# Patient Record
Sex: MALE | Race: BLACK OR AFRICAN AMERICAN | NOT HISPANIC OR LATINO | Employment: OTHER | URBAN - METROPOLITAN AREA
[De-identification: names, ages, dates, MRNs, and addresses within clinical notes are randomized per-mention and may not be internally consistent; named-entity substitution may affect disease eponyms.]

---

## 2019-12-20 ENCOUNTER — TELEPHONE (OUTPATIENT)
Dept: GASTROENTEROLOGY | Facility: CLINIC | Age: 74
End: 2019-12-20

## 2019-12-20 NOTE — TELEPHONE ENCOUNTER
Arik pt - Pt states we have to call the [facility] HOSPITAL choice program and ask for Wilian Masters in order for pt to get approval for an appt with us  The number is [phone]   ty

## 2019-12-31 RX ORDER — PREDNISONE 10 MG/1
TABLET ORAL
COMMUNITY
Start: 2018-02-15 | End: 2020-01-09 | Stop reason: ALTCHOICE

## 2019-12-31 RX ORDER — TAMSULOSIN HYDROCHLORIDE 0.4 MG/1
0.4 CAPSULE ORAL
COMMUNITY
Start: 2019-12-23 | End: 2021-11-12

## 2019-12-31 RX ORDER — AMLODIPINE BESYLATE 10 MG/1
10 TABLET ORAL DAILY
COMMUNITY
End: 2022-04-22 | Stop reason: SDUPTHER

## 2019-12-31 RX ORDER — LEVOFLOXACIN 500 MG/1
TABLET, FILM COATED ORAL
Refills: 0 | COMMUNITY
Start: 2019-10-03 | End: 2020-01-09 | Stop reason: ALTCHOICE

## 2019-12-31 RX ORDER — CLONAZEPAM 0.5 MG/1
0.5 TABLET ORAL 2 TIMES DAILY PRN
COMMUNITY
End: 2020-06-15 | Stop reason: SDUPTHER

## 2019-12-31 RX ORDER — PAROXETINE 30 MG/1
30 TABLET, FILM COATED ORAL
COMMUNITY
End: 2020-12-30 | Stop reason: SDUPTHER

## 2019-12-31 RX ORDER — LISINOPRIL 10 MG/1
TABLET ORAL
COMMUNITY
End: 2020-01-09 | Stop reason: ALTCHOICE

## 2019-12-31 RX ORDER — GABAPENTIN 300 MG/1
CAPSULE ORAL
COMMUNITY
Start: 2018-02-14 | End: 2021-05-05

## 2019-12-31 RX ORDER — AZITHROMYCIN 250 MG/1
TABLET, FILM COATED ORAL
COMMUNITY
Start: 2018-05-08 | End: 2020-01-09 | Stop reason: ALTCHOICE

## 2019-12-31 RX ORDER — PROMETHAZINE HYDROCHLORIDE AND CODEINE PHOSPHATE 6.25; 1 MG/5ML; MG/5ML
5 SOLUTION ORAL EVERY 4 HOURS PRN
Refills: 0 | COMMUNITY
Start: 2019-10-03 | End: 2020-01-09 | Stop reason: ALTCHOICE

## 2020-01-02 ENCOUNTER — OFFICE VISIT (OUTPATIENT)
Dept: GASTROENTEROLOGY | Facility: CLINIC | Age: 75
End: 2020-01-02
Payer: COMMERCIAL

## 2020-01-02 ENCOUNTER — TELEPHONE (OUTPATIENT)
Dept: GASTROENTEROLOGY | Facility: CLINIC | Age: 75
End: 2020-01-02

## 2020-01-02 ENCOUNTER — PREP FOR PROCEDURE (OUTPATIENT)
Dept: GASTROENTEROLOGY | Facility: CLINIC | Age: 75
End: 2020-01-02

## 2020-01-02 VITALS
DIASTOLIC BLOOD PRESSURE: 82 MMHG | WEIGHT: 240.2 LBS | HEIGHT: 68 IN | SYSTOLIC BLOOD PRESSURE: 166 MMHG | BODY MASS INDEX: 36.4 KG/M2 | HEART RATE: 96 BPM

## 2020-01-02 DIAGNOSIS — D64.9 ANEMIA, UNSPECIFIED TYPE: Primary | ICD-10-CM

## 2020-01-02 PROCEDURE — 99203 OFFICE O/P NEW LOW 30 MIN: CPT | Performed by: PHYSICIAN ASSISTANT

## 2020-01-02 RX ORDER — CLONIDINE HYDROCHLORIDE 0.1 MG/1
0.1 TABLET ORAL EVERY 12 HOURS SCHEDULED
COMMUNITY
End: 2020-09-30

## 2020-01-02 RX ORDER — OMEGA-3S/DHA/EPA/FISH OIL/D3 300MG-1000
400 CAPSULE ORAL DAILY
COMMUNITY

## 2020-01-02 RX ORDER — DIPHENOXYLATE HYDROCHLORIDE AND ATROPINE SULFATE 2.5; .025 MG/1; MG/1
1 TABLET ORAL DAILY
COMMUNITY

## 2020-01-02 NOTE — TELEPHONE ENCOUNTER
[facility] and hemocult test [facility]      [facility] and will [facility]ed to our office

## 2020-01-02 NOTE — PROGRESS NOTES
[facility] - Outpatient Consultation  Piotr Garcia 69 y o  male MRN: [id number]  Encounter: [id number]          ASSESSMENT AND PLAN:      1  Anemia, unspecified type  New  Per patient Hemoccult was positive  Will investigate with EGD and Colonoscopy  Obtain recent labs  If negative EGD/Colonoscopy will have to consider VCE    ______________________________________________________________________    HPI:  56-year-old male presents for evaluation of anemia  Per the patient this is a new problem  He states that he suffers from some slight constipation but denies any other significant gastrointestinal problems  He noted blood in his stool on 1 occasion and reports that he submit a stool Hemoccult and was told it was positive  He is currently on treatment for prostate cancer  He completed radiation in May of this year and is now receiving Lupron shots  He denies requiring chemotherapy  He states that his PSA is normal at present  He had a colonoscopy in 8/2016 with a small tubular adenoma removed  He is not on any blood thinning medications      REVIEW OF SYSTEMS:    CONSTITUTIONAL: Denies any fever, chills, rigors, and weight loss  HEENT: No earache or tinnitus  Denies hearing loss or visual disturbances  CARDIOVASCULAR: No chest pain or palpitations  RESPIRATORY: Denies any cough, hemoptysis, shortness of breath or dyspnea on exertion  GASTROINTESTINAL: As noted in the History of Present Illness  GENITOURINARY: No problems with urination  Denies any hematuria or dysuria  NEUROLOGIC: No dizziness or vertigo, denies headaches  MUSCULOSKELETAL: Denies any muscle or joint pain  SKIN: Denies skin rashes or itching  ENDOCRINE: Denies excessive thirst  Denies intolerance to heat or cold  PSYCHOSOCIAL: Denies depression or anxiety  Denies any recent memory loss         Historical Information   Past Medical History:   Diagnosis Date    Diabetes mellitus ([facility] [address]     Hypertension     PTSD (post-traumatic stress disorder)      History reviewed  No pertinent surgical history  Social History   Social History     Substance and Sexual Activity   Alcohol Use Not Currently     Social History     Substance and Sexual Activity   Drug Use Never     Social History     Tobacco Use   Smoking Status Never Smoker   Smokeless Tobacco Never Used     Family History   Problem Relation Age of Onset    Diabetes Mother     Hypertension Mother     Multiple sclerosis Sister     Parkinsonism Sister        Meds/Allergies       Current Outpatient Medications:     amLODIPine (NORVASC) 10 mg tablet    cholecalciferol (VITAMIN D3) 400 units tablet    clonazePAM (KlonoPIN) 0 5 mg tablet    cloNIDine (CATAPRES) 0 1 mg tablet    gabapentin (NEURONTIN) 300 mg capsule    glucose blood test strip    multivitamin (THERAGRAN) TABS    PARoxetine (PAXIL) 30 mg tablet    POTASSIUM PO    Promethazine-Codeine 6 25-10 MG/5ML SOLN    tamsulosin (FLOMAX) 0 4 mg    azithromycin (ZITHROMAX) 250 mg tablet    levofloxacin (LEVAQUIN) 500 mg tablet    lisinopril (ZESTRIL) 10 mg tablet    predniSONE 10 mg tablet    Allergies   Allergen Reactions    Lisinopril            Objective     Blood pressure 166/82, pulse 96, height 5' 8" (1 727 m), weight 109 kg (240 lb 3 2 oz)  Body mass index is 36 52 kg/m²  PHYSICAL EXAM:      General Appearance:   Alert, cooperative, no distress   HEENT:   Normocephalic, atraumatic, anicteric      Neck:  Supple, symmetrical, trachea midline   Lungs:   Clear to auscultation bilaterally; no rales, rhonchi or wheezing; respirations unlabored    Heart[de-identified]   Regular rate and rhythm; no murmur, rub, or gallop     Abdomen:   Soft, non-tender, non-distended; normal bowel sounds; no masses, no organomegaly    Genitalia:   Deferred    Rectal:   Deferred    Extremities:  No cyanosis, clubbing or edema    Pulses:  2+ and symmetric    Skin:  No jaundice, rashes, or lesions    Lymph nodes:  No palpable cervical lymphadenopathy        Lab Results:   No visits with results within 1 Day(s) from this visit  Latest known visit with results is:   No results found for any previous visit  Radiology Results:   No results found

## 2020-01-09 ENCOUNTER — ANESTHESIA (OUTPATIENT)
Dept: GASTROENTEROLOGY | Facility: HOSPITAL | Age: 75
End: 2020-01-09

## 2020-01-09 ENCOUNTER — ANESTHESIA EVENT (OUTPATIENT)
Dept: GASTROENTEROLOGY | Facility: HOSPITAL | Age: 75
End: 2020-01-09

## 2020-01-09 ENCOUNTER — HOSPITAL ENCOUNTER (OUTPATIENT)
Dept: GASTROENTEROLOGY | Facility: HOSPITAL | Age: 75
Setting detail: OUTPATIENT SURGERY
Discharge: HOME | End: 2020-01-09
Attending: INTERNAL MEDICINE | Admitting: INTERNAL MEDICINE
Payer: OTHER GOVERNMENT

## 2020-01-09 VITALS
RESPIRATION RATE: 17 BRPM | WEIGHT: 235.01 LBS | DIASTOLIC BLOOD PRESSURE: 90 MMHG | TEMPERATURE: 98.5 F | HEART RATE: 85 BPM | BODY MASS INDEX: 35.62 KG/M2 | OXYGEN SATURATION: 93 % | HEIGHT: 68 IN | SYSTOLIC BLOOD PRESSURE: 145 MMHG

## 2020-01-09 DIAGNOSIS — D64.9 ANEMIA, UNSPECIFIED TYPE: ICD-10-CM

## 2020-01-09 LAB — GLUCOSE SERPL-MCNC: 115 MG/DL (ref 65–140)

## 2020-01-09 PROCEDURE — 82948 REAGENT STRIP/BLOOD GLUCOSE: CPT

## 2020-01-09 PROCEDURE — 88305 TISSUE EXAM BY PATHOLOGIST: CPT | Performed by: PATHOLOGY

## 2020-01-09 PROCEDURE — NC001 PR NO CHARGE: Performed by: INTERNAL MEDICINE

## 2020-01-09 PROCEDURE — 43239 EGD BIOPSY SINGLE/MULTIPLE: CPT | Performed by: INTERNAL MEDICINE

## 2020-01-09 PROCEDURE — 45380 COLONOSCOPY AND BIOPSY: CPT | Performed by: INTERNAL MEDICINE

## 2020-01-09 RX ORDER — SODIUM CHLORIDE, SODIUM LACTATE, POTASSIUM CHLORIDE, CALCIUM CHLORIDE 600; 310; 30; 20 MG/100ML; MG/100ML; MG/100ML; MG/100ML
INJECTION, SOLUTION INTRAVENOUS CONTINUOUS PRN
Status: DISCONTINUED | OUTPATIENT
Start: 2020-01-09 | End: 2020-01-09 | Stop reason: SURG

## 2020-01-09 RX ORDER — PROPOFOL 10 MG/ML
INJECTION, EMULSION INTRAVENOUS AS NEEDED
Status: DISCONTINUED | OUTPATIENT
Start: 2020-01-09 | End: 2020-01-09 | Stop reason: SURG

## 2020-01-09 RX ORDER — LIDOCAINE HYDROCHLORIDE 20 MG/ML
INJECTION, SOLUTION EPIDURAL; INFILTRATION; INTRACAUDAL; PERINEURAL AS NEEDED
Status: DISCONTINUED | OUTPATIENT
Start: 2020-01-09 | End: 2020-01-09 | Stop reason: SURG

## 2020-01-09 RX ORDER — GLYCOPYRROLATE 0.2 MG/ML
INJECTION INTRAMUSCULAR; INTRAVENOUS AS NEEDED
Status: DISCONTINUED | OUTPATIENT
Start: 2020-01-09 | End: 2020-01-09 | Stop reason: SURG

## 2020-01-09 RX ADMIN — PROPOFOL 30 MG: 10 INJECTION, EMULSION INTRAVENOUS at 07:32

## 2020-01-09 RX ADMIN — PROPOFOL 30 MG: 10 INJECTION, EMULSION INTRAVENOUS at 07:29

## 2020-01-09 RX ADMIN — PROPOFOL 20 MG: 10 INJECTION, EMULSION INTRAVENOUS at 07:23

## 2020-01-09 RX ADMIN — SODIUM CHLORIDE, SODIUM LACTATE, POTASSIUM CHLORIDE, AND CALCIUM CHLORIDE: .6; .31; .03; .02 INJECTION, SOLUTION INTRAVENOUS at 07:21

## 2020-01-09 RX ADMIN — GLYCOPYRROLATE 0.2 MG: 0.2 INJECTION, SOLUTION INTRAMUSCULAR; INTRAVENOUS at 07:22

## 2020-01-09 RX ADMIN — PROPOFOL 110 MG: 10 INJECTION, EMULSION INTRAVENOUS at 07:22

## 2020-01-09 RX ADMIN — PROPOFOL 30 MG: 10 INJECTION, EMULSION INTRAVENOUS at 07:26

## 2020-01-09 RX ADMIN — LIDOCAINE HYDROCHLORIDE 100 MG: 20 INJECTION, SOLUTION EPIDURAL; INFILTRATION; INTRACAUDAL; PERINEURAL at 07:22

## 2020-01-09 RX ADMIN — PROPOFOL 30 MG: 10 INJECTION, EMULSION INTRAVENOUS at 07:35

## 2020-01-09 NOTE — ANESTHESIA POSTPROCEDURE EVALUATION
Post-Op Assessment Note    CV Status:  Stable  Pain Score: 0    Pain management: adequate     Mental Status:  Alert and awake   Hydration Status:  Euvolemic   PONV Controlled:  Controlled   Airway Patency:  Patent   Post Op Vitals Reviewed: Yes      Staff: CRNA           BP   139/72   Temp     Pulse  91   Resp   15   SpO2   97%

## 2020-01-09 NOTE — DISCHARGE INSTRUCTIONS

## 2020-01-09 NOTE — H&P
History and Physical - [facility]  Mohit Green 69 y o  male MRN: [id number]      HPI: Mohit Green is a 69y o  year old male who presents for anemia, heme-positive stool      REVIEW OF SYSTEMS: Per the HPI, and otherwise unremarkable  Historical Information   Past Medical History:   Diagnosis Date    Cancer [facility])     PROSTATE CANCER    Colon polyp     Diabetes mellitus ([address] )     Hyperlipidemia     Hypertension     PTSD (post-traumatic stress disorder)      Past Surgical History:   Procedure Laterality Date    APPENDECTOMY       Social History   Social History     Substance and Sexual Activity   Alcohol Use Not Currently     Social History     Substance and Sexual Activity   Drug Use Never     Social History     Tobacco Use   Smoking Status Never Smoker   Smokeless Tobacco Never Used     Family History   Problem Relation Age of Onset    Diabetes Mother     Hypertension Mother     Multiple sclerosis Sister     Parkinsonism Sister        Meds/Allergies       (Not in a hospital admission)    Allergies   Allergen Reactions    Lisinopril Anaphylaxis       Objective     Blood pressure 159/93, pulse 78, temperature (!) 97 1 °F (36 2 °C), temperature source Temporal, resp  rate 19, height 5' 8" (1 727 m), weight 107 kg (235 lb 0 2 oz), SpO2 95 %  PHYSICAL EXAM    Gen: NAD  CV: RRR  CHEST: Clear  ABD: soft, NT/ND  EXT: no edema      ASSESSMENT/PLAN:  This is a 69y o  year old male here for esophagogastroduodenoscopy, colonoscopy, and he is stable and optimized for his procedure

## 2020-01-09 NOTE — ANESTHESIA PREPROCEDURE EVALUATION
Review of Systems/Medical History  Patient summary reviewed  Chart reviewed  No history of anesthetic complications     Cardiovascular  EKG reviewed, Exercise tolerance (METS): >4,  Hyperlipidemia, Hypertension controlled,    Pulmonary  Asthma , PRN med  controlled Last rescue: < 1 week ago Asthma type of rescue: PRN nebulizer,        GI/Hepatic            Endo/Other  Diabetes well controlled Diet controlled,   Obesity    GYN       Hematology   Musculoskeletal       Neurology   Psychology   Anxiety,              Physical Exam    Airway    Mallampati score: III  TM Distance: >3 FB  Neck ROM: full     Dental   upper dentures and lower dentures,     Cardiovascular      Pulmonary      Other Findings        Anesthesia Plan  ASA Score- 3     Anesthesia Type- IV sedation with anesthesia with ASA Monitors  Additional Monitors:   Airway Plan:         Plan Factors-    Induction- intravenous  Postoperative Plan-     Informed Consent- Anesthetic plan and risks discussed with patient  I personally reviewed this patient with the CRNA  Discussed and agreed on the Anesthesia Plan with the CRNA  Katy Navarro

## 2020-01-21 ENCOUNTER — TELEPHONE (OUTPATIENT)
Dept: GASTROENTEROLOGY | Facility: CLINIC | Age: 75
End: 2020-01-21

## 2020-02-03 ENCOUNTER — OFFICE VISIT (OUTPATIENT)
Dept: GASTROENTEROLOGY | Facility: CLINIC | Age: 75
End: 2020-02-03
Payer: COMMERCIAL

## 2020-02-03 DIAGNOSIS — D50.9 IRON DEFICIENCY ANEMIA, UNSPECIFIED IRON DEFICIENCY ANEMIA TYPE: Primary | ICD-10-CM

## 2020-02-05 PROCEDURE — 91110 GI TRC IMG INTRAL ESOPH-ILE: CPT | Performed by: INTERNAL MEDICINE

## 2020-02-07 ENCOUNTER — TELEPHONE (OUTPATIENT)
Dept: GASTROENTEROLOGY | Facility: CLINIC | Age: 75
End: 2020-02-07

## 2020-05-29 ENCOUNTER — TELEMEDICINE (OUTPATIENT)
Dept: FAMILY MEDICINE CLINIC | Facility: CLINIC | Age: 75
End: 2020-05-29
Payer: COMMERCIAL

## 2020-05-29 VITALS
DIASTOLIC BLOOD PRESSURE: 95 MMHG | HEART RATE: 74 BPM | TEMPERATURE: 97.3 F | OXYGEN SATURATION: 97 % | RESPIRATION RATE: 20 BRPM | SYSTOLIC BLOOD PRESSURE: 157 MMHG

## 2020-05-29 DIAGNOSIS — M54.50 LOW BACK PAIN, UNSPECIFIED BACK PAIN LATERALITY, UNSPECIFIED CHRONICITY, UNSPECIFIED WHETHER SCIATICA PRESENT: ICD-10-CM

## 2020-05-29 DIAGNOSIS — M54.50 ACUTE BILATERAL LOW BACK PAIN WITHOUT SCIATICA: ICD-10-CM

## 2020-05-29 DIAGNOSIS — E11.9 TYPE 2 DIABETES MELLITUS WITHOUT COMPLICATION, WITH LONG-TERM CURRENT USE OF INSULIN (HCC): ICD-10-CM

## 2020-05-29 DIAGNOSIS — I10 ESSENTIAL HYPERTENSION: ICD-10-CM

## 2020-05-29 DIAGNOSIS — Z79.4 TYPE 2 DIABETES MELLITUS WITHOUT COMPLICATION, WITH LONG-TERM CURRENT USE OF INSULIN (HCC): ICD-10-CM

## 2020-05-29 DIAGNOSIS — C61 PROSTATE CANCER (HCC): Primary | ICD-10-CM

## 2020-05-29 DIAGNOSIS — F32.A DEPRESSION, UNSPECIFIED DEPRESSION TYPE: ICD-10-CM

## 2020-05-29 PROCEDURE — 99214 OFFICE O/P EST MOD 30 MIN: CPT | Performed by: FAMILY MEDICINE

## 2020-05-29 RX ORDER — INSULIN GLARGINE 100 [IU]/ML
24 INJECTION, SOLUTION SUBCUTANEOUS
COMMUNITY

## 2020-05-29 RX ORDER — CYCLOBENZAPRINE HCL 10 MG
10 TABLET ORAL 3 TIMES DAILY PRN
Qty: 30 TABLET | Refills: 0 | Status: SHIPPED | OUTPATIENT
Start: 2020-05-29 | End: 2021-05-05 | Stop reason: CLARIF

## 2020-06-15 DIAGNOSIS — F41.9 ANXIETY: Primary | ICD-10-CM

## 2020-06-15 RX ORDER — CLONAZEPAM 0.5 MG/1
TABLET ORAL
Qty: 60 TABLET | OUTPATIENT
Start: 2020-06-15

## 2020-06-15 RX ORDER — CLONAZEPAM 0.5 MG/1
0.5 TABLET ORAL 2 TIMES DAILY PRN
Qty: 60 TABLET | Refills: 5 | Status: SHIPPED | OUTPATIENT
Start: 2020-06-15 | End: 2021-01-26

## 2020-09-30 DIAGNOSIS — I10 ESSENTIAL HYPERTENSION: Primary | ICD-10-CM

## 2020-09-30 RX ORDER — CLONIDINE HYDROCHLORIDE 0.1 MG/1
TABLET ORAL
Qty: 180 TABLET | Refills: 1 | Status: SHIPPED | OUTPATIENT
Start: 2020-09-30 | End: 2021-01-26

## 2020-11-16 ENCOUNTER — IMMUNIZATIONS (OUTPATIENT)
Dept: FAMILY MEDICINE CLINIC | Facility: CLINIC | Age: 75
End: 2020-11-16
Payer: COMMERCIAL

## 2020-11-16 VITALS — TEMPERATURE: 97.3 F

## 2020-11-16 DIAGNOSIS — Z23 IMMUNIZATION DUE: Primary | ICD-10-CM

## 2020-11-16 PROCEDURE — G0008 ADMIN INFLUENZA VIRUS VAC: HCPCS

## 2020-11-16 PROCEDURE — 90662 IIV NO PRSV INCREASED AG IM: CPT

## 2020-12-22 DIAGNOSIS — R42 VERTIGO: Primary | ICD-10-CM

## 2020-12-22 RX ORDER — MECLIZINE HCL 12.5 MG/1
1 TABLET ORAL 3 TIMES DAILY PRN
COMMUNITY
End: 2020-12-23

## 2020-12-23 RX ORDER — MECLIZINE HCL 12.5 MG/1
TABLET ORAL
Qty: 60 TABLET | Refills: 3 | Status: SHIPPED | OUTPATIENT
Start: 2020-12-23 | End: 2021-05-05 | Stop reason: CLARIF

## 2020-12-23 RX ORDER — MECLIZINE HCL 12.5 MG/1
12.5 TABLET ORAL 3 TIMES DAILY PRN
Qty: 30 TABLET | Refills: 2 | Status: SHIPPED | OUTPATIENT
Start: 2020-12-23

## 2020-12-30 ENCOUNTER — TELEPHONE (OUTPATIENT)
Dept: FAMILY MEDICINE CLINIC | Facility: CLINIC | Age: 75
End: 2020-12-30

## 2020-12-30 DIAGNOSIS — F32.A DEPRESSION, UNSPECIFIED DEPRESSION TYPE: Primary | ICD-10-CM

## 2020-12-30 RX ORDER — PAROXETINE 30 MG/1
30 TABLET, FILM COATED ORAL EVERY MORNING
Qty: 90 TABLET | Refills: 1 | Status: SHIPPED | OUTPATIENT
Start: 2020-12-30 | End: 2021-07-24

## 2020-12-30 NOTE — TELEPHONE ENCOUNTER
Patient called stated that he needs refill on his PARoxetine (PAXIL) 30 mg tablet   Please call into [facility] [employer] Thank you [facility]

## 2021-01-23 DIAGNOSIS — I10 ESSENTIAL HYPERTENSION: ICD-10-CM

## 2021-01-25 DIAGNOSIS — F41.9 ANXIETY: ICD-10-CM

## 2021-01-26 RX ORDER — CLONAZEPAM 0.5 MG/1
0.5 TABLET ORAL 2 TIMES DAILY PRN
Qty: 60 TABLET | Refills: 3 | Status: SHIPPED | OUTPATIENT
Start: 2021-01-26 | End: 2021-09-12 | Stop reason: SDUPTHER

## 2021-01-26 RX ORDER — CLONIDINE HYDROCHLORIDE 0.1 MG/1
TABLET ORAL
Qty: 180 TABLET | Refills: 1 | Status: SHIPPED | OUTPATIENT
Start: 2021-01-26 | End: 2021-07-10

## 2021-04-13 DIAGNOSIS — I10 ESSENTIAL HYPERTENSION: ICD-10-CM

## 2021-04-13 DIAGNOSIS — Z79.4 TYPE 2 DIABETES MELLITUS WITHOUT COMPLICATION, WITH LONG-TERM CURRENT USE OF INSULIN (HCC): Primary | ICD-10-CM

## 2021-04-13 DIAGNOSIS — E11.9 TYPE 2 DIABETES MELLITUS WITHOUT COMPLICATION, WITH LONG-TERM CURRENT USE OF INSULIN (HCC): Primary | ICD-10-CM

## 2021-04-13 DIAGNOSIS — Z13.220 SCREENING CHOLESTEROL LEVEL: ICD-10-CM

## 2021-05-03 LAB
CREAT ?TM UR-SCNC: 158.7 UMOL/L
EXT MICROALBUMIN URINE RANDOM: 10.9
HBA1C MFR BLD HPLC: 5.1 %
MICROALBUMIN/CREAT UR: 68.7 MG/G{CREAT}

## 2021-05-05 ENCOUNTER — OFFICE VISIT (OUTPATIENT)
Dept: FAMILY MEDICINE CLINIC | Facility: CLINIC | Age: 76
End: 2021-05-05
Payer: COMMERCIAL

## 2021-05-05 VITALS
WEIGHT: 239 LBS | OXYGEN SATURATION: 96 % | HEART RATE: 82 BPM | BODY MASS INDEX: 36.22 KG/M2 | HEIGHT: 68 IN | DIASTOLIC BLOOD PRESSURE: 70 MMHG | TEMPERATURE: 97.2 F | RESPIRATION RATE: 16 BRPM | SYSTOLIC BLOOD PRESSURE: 120 MMHG

## 2021-05-05 DIAGNOSIS — F33.40 RECURRENT MAJOR DEPRESSIVE DISORDER, IN REMISSION (HCC): ICD-10-CM

## 2021-05-05 DIAGNOSIS — Z11.59 NEED FOR HEPATITIS C SCREENING TEST: Primary | ICD-10-CM

## 2021-05-05 DIAGNOSIS — M54.50 LOW BACK PAIN, UNSPECIFIED BACK PAIN LATERALITY, UNSPECIFIED CHRONICITY, UNSPECIFIED WHETHER SCIATICA PRESENT: ICD-10-CM

## 2021-05-05 DIAGNOSIS — I10 ESSENTIAL HYPERTENSION: ICD-10-CM

## 2021-05-05 DIAGNOSIS — Z00.00 WELL ADULT EXAM: ICD-10-CM

## 2021-05-05 DIAGNOSIS — E11.9 TYPE 2 DIABETES MELLITUS WITHOUT COMPLICATION, WITH LONG-TERM CURRENT USE OF INSULIN (HCC): ICD-10-CM

## 2021-05-05 DIAGNOSIS — E66.01 OBESITY, MORBID (HCC): ICD-10-CM

## 2021-05-05 DIAGNOSIS — Z79.4 TYPE 2 DIABETES MELLITUS WITHOUT COMPLICATION, WITH LONG-TERM CURRENT USE OF INSULIN (HCC): ICD-10-CM

## 2021-05-05 DIAGNOSIS — E26.09 PRIMARY HYPERALDOSTERONISM (HCC): ICD-10-CM

## 2021-05-05 DIAGNOSIS — E78.2 MIXED HYPERLIPIDEMIA: ICD-10-CM

## 2021-05-05 DIAGNOSIS — C61 PROSTATE CANCER (HCC): ICD-10-CM

## 2021-05-05 PROCEDURE — G0439 PPPS, SUBSEQ VISIT: HCPCS | Performed by: FAMILY MEDICINE

## 2021-05-05 PROCEDURE — 99214 OFFICE O/P EST MOD 30 MIN: CPT | Performed by: FAMILY MEDICINE

## 2021-05-05 RX ORDER — ATORVASTATIN CALCIUM 40 MG/1
TABLET, FILM COATED ORAL
COMMUNITY
Start: 2021-01-13

## 2021-05-05 RX ORDER — BACLOFEN 5 MG/1
TABLET ORAL
COMMUNITY
Start: 2021-03-15 | End: 2021-11-12

## 2021-05-05 RX ORDER — BUDESONIDE AND FORMOTEROL FUMARATE DIHYDRATE 160; 4.5 UG/1; UG/1
AEROSOL RESPIRATORY (INHALATION)
COMMUNITY
Start: 2021-04-16 | End: 2021-11-12

## 2021-05-05 RX ORDER — TRAZODONE HYDROCHLORIDE 50 MG/1
TABLET ORAL
COMMUNITY
Start: 2021-01-08 | End: 2022-06-08 | Stop reason: SDUPTHER

## 2021-05-05 RX ORDER — LIDOCAINE 50 MG/G
PATCH TOPICAL
COMMUNITY
Start: 2021-01-08

## 2021-05-05 RX ORDER — SPIRONOLACTONE 25 MG/1
TABLET ORAL
COMMUNITY
Start: 2020-11-12

## 2021-05-05 RX ORDER — FLUTICASONE PROPIONATE 50 MCG
SPRAY, SUSPENSION (ML) NASAL
COMMUNITY
Start: 2021-04-28 | End: 2022-07-22

## 2021-05-05 RX ORDER — IPRATROPIUM BROMIDE AND ALBUTEROL SULFATE 2.5; .5 MG/3ML; MG/3ML
SOLUTION RESPIRATORY (INHALATION)
COMMUNITY
Start: 2020-12-04

## 2021-05-05 NOTE — PROGRESS NOTES
BMI Counseling: Body mass index is 36 88 kg/m²  The BMI is above normal  Nutrition recommendations include decreasing portion sizes, encouraging healthy choices of fruits and vegetables, decreasing fast food intake, consuming healthier snacks, limiting drinks that contain sugar, moderation in carbohydrate intake, increasing intake of lean protein, reducing intake of saturated and trans fat and reducing intake of cholesterol  Exercise recommendations include exercising 3-5 times per week  No pharmacotherapy was ordered  Patient referred to PCP due to patient being overweight  Depression Screening and Follow-up Plan: Patient's depression screening was positive with a PHQ-2 score of 2  Their PHQ-9 score was 3  Patient assessed for underlying major depression  Brief counseling provided and recommend additional follow-up/re-evaluation next office visit  Patient advised to follow-up with PCP for further management  Falls Plan of Care: balance, strength, and gait training instructions were provided  Home safety education provided  Assessment/Plan:         Problem List Items Addressed This Visit        Endocrine    Type 2 diabetes mellitus without complication, with long-term current use of insulin ([address] )     Needs A1C documented ASAP  No results found for: HGBA1C            Cardiovascular and Mediastinum    Essential hypertension     Patient is stable with current anti-hypertensive medicine and continue to follow a low sodium diet and take current medication            Other    Depression     Patient is stable  and will continue present plan of care and reassess at next routine visit         Low back pain     Patient is stable  and will continue present plan of care and reassess at next routine visit           Other Visit Diagnoses     Need for hepatitis C screening test    -  Primary    Well adult exam                Subjective:      Patient ID: Igor Ramey is a 70 y o  male       this is a  73-year-old black male I here for checkup for his Medicare wellness type 2 diabetes hypertension prostate cancer hyperlipidemia and chronic back pain  Patient is overdue for his lab workup ordered all the lab work he needs today he will get that within next week  Patient has not had a COVID-19 vaccine is doing well  Patient is still getting injections for his prostate cancer and he sees a gets his last 1 coming up in this August       The following portions of the patient's history were reviewed and updated as appropriate:   Past Medical History:  He has a past medical history of Cancer ([address] ), Colon polyp, Hyperlipidemia, PTSD (post-traumatic stress disorder), and Type 2 diabetes mellitus without complication, with long-term current use of insulin ([address] ) (5/29/2020)  ,  _______________________________________________________________________  Medical Problems:  does not have any pertinent problems on file ,  _______________________________________________________________________  Past Surgical History:   has a past surgical history that includes Appendectomy  ,  _______________________________________________________________________  Family History:  family history includes Diabetes in his mother; Hypertension in his mother; Multiple sclerosis in his sister; Parkinsonism in his sister  ,  _______________________________________________________________________  Social History:   reports that he has never smoked  He has never used smokeless tobacco  He reports previous alcohol use  He reports that he does not use drugs  ,  _______________________________________________________________________  Allergies:  is allergic to lisinopril     _______________________________________________________________________  Current Outpatient Medications   Medication Sig Dispense Refill    amLODIPine (NORVASC) 10 mg tablet Take 10 mg by mouth daily      cholecalciferol (VITAMIN D3) 400 units tablet Take 400 Units by mouth daily      clonazePAM (KlonoPIN) 0 5 mg tablet TAKE 1 TABLET (0 5 MG TOTAL) BY MOUTH 2 (TWO) TIMES A DAY AS NEEDED FOR ANXIETY  60 tablet 3    cloNIDine (CATAPRES) 0 1 mg tablet TAKE 1 TABLET BY MOUTH TWICE A  tablet 1    cyclobenzaprine (FLEXERIL) 10 mg tablet Take 1 tablet (10 mg total) by mouth 3 (three) times a day as needed for muscle spasms 30 tablet 0    gabapentin (NEURONTIN) 300 mg capsule       glucose blood test strip 1 each by Other route daily as needed Use as instructed      insulin glargine (LANTUS) 100 units/mL subcutaneous injection Inject 24 Units under the skin daily at bedtime      meclizine (ANTIVERT) 12 5 MG tablet Take 1 tablet (12 5 mg total) by mouth 3 (three) times a day as needed for dizziness 30 tablet 2    meclizine (ANTIVERT) 12 5 MG tablet TAKE 1 TABLET BY MOUTH THREE TIMES A DAY AS NEEDED 60 tablet 3    multivitamin (THERAGRAN) TABS Take 1 tablet by mouth daily      PARoxetine (PAXIL) 30 mg tablet Take 1 tablet (30 mg total) by mouth every morning 90 tablet 1    POTASSIUM PO Take 30 mg by mouth      tamsulosin (FLOMAX) 0 4 mg Take 0 4 mg by mouth       No current facility-administered medications for this visit  Patient's shoes and socks removed  Right Foot/Ankle   Right Foot Inspection  Skin Exam: skin normal, skin intact, dry skin and warmth no callus, no erythema, no maceration, no abnormal color, no pre-ulcer, no ulcer and no callus                          Toe Exam: ROM and strength within normal limitsno tenderness  Sensory   Vibration: intact  Proprioception: intact   Monofilament testing: intact  Vascular  Capillary refills: < 3 seconds  The right DP pulse is 2+  The right PT pulse is 2+       Left Foot/Ankle  Left Foot Inspection  Skin Exam: skin intact, dry skin and warmthno erythema, no maceration and normal color                         Toe Exam: no swelling and no tenderness                   Sensory   Vibration: intact  Proprioception: intact  Monofilament: intact  Vascular  Capillary refills: < 3 seconds  The left DP pulse is 2+  The left PT pulse is 2+  Assign Risk Category:  No deformity present; No loss of protective sensation; No weak pulses       Risk: 0    ______________________________________________________________________  Review of Systems   Constitutional: Negative for activity change, appetite change, chills, fatigue, fever and unexpected weight change  HENT: Negative for congestion, ear pain, hearing loss, mouth sores, postnasal drip, sinus pressure, sinus pain, sneezing and sore throat  Respiratory: Negative for apnea, cough, shortness of breath and wheezing  Cardiovascular: Negative for chest pain, palpitations and leg swelling  Gastrointestinal: Negative for abdominal pain, constipation, diarrhea, nausea and vomiting  Endocrine: Negative for cold intolerance and heat intolerance  Genitourinary: Negative for dysuria, frequency and hematuria  Musculoskeletal: Negative for arthralgias, back pain, gait problem, joint swelling and neck pain  Skin: Negative for rash  Neurological: Negative for dizziness, weakness and numbness  Hematological: Does not bruise/bleed easily  Psychiatric/Behavioral: Negative for agitation, behavioral problems, confusion, hallucinations and sleep disturbance  The patient is not nervous/anxious  Objective: There were no vitals filed for this visit  There is no height or weight on file to calculate BMI  Physical Exam  Vitals signs and nursing note reviewed  Constitutional:       Appearance: He is well-developed  He is obese  HENT:      Head: Normocephalic and atraumatic  Nose: Nose normal       Mouth/Throat:      Mouth: Mucous membranes are moist    Eyes:      General: No scleral icterus  Conjunctiva/sclera: Conjunctivae normal       Pupils: Pupils are equal, round, and reactive to light  Neck:      Musculoskeletal: Normal range of motion and neck supple        Thyroid: No thyromegaly  Cardiovascular:      Rate and Rhythm: Normal rate and regular rhythm  Pulses: no weak pulses          Dorsalis pedis pulses are 2+ on the right side and 2+ on the left side  Posterior tibial pulses are 2+ on the right side and 2+ on the left side  Heart sounds: Normal heart sounds  Pulmonary:      Effort: Pulmonary effort is normal  No respiratory distress  Breath sounds: Normal breath sounds  No wheezing  Abdominal:      General: Bowel sounds are normal       Palpations: Abdomen is soft  Tenderness: There is no abdominal tenderness  There is no guarding or rebound  Musculoskeletal: Normal range of motion  Feet:    Feet:      Right foot:      Skin integrity: Warmth and dry skin present  No ulcer, skin breakdown, erythema or callus  Left foot:      Skin integrity: Warmth and dry skin present  No skin breakdown or erythema  Skin:     General: Skin is warm and dry  Findings: No rash  Neurological:      Mental Status: He is alert and oriented to person, place, and time  Psychiatric:         Mood and Affect: Mood normal          Behavior: Behavior normal          Thought Content:  Thought content normal          Judgment: Judgment normal

## 2021-05-05 NOTE — PATIENT INSTRUCTIONS
Medicare Preventive Visit Patient Instructions  Thank you for completing your Welcome to Medicare Visit or Medicare Annual Wellness Visit today  Your next wellness visit will be due in one year (5/6/2022)  The screening/preventive services that you may require over the next 5-10 years are detailed below  Some tests may not apply to you based off risk factors and/or age  Screening tests ordered at today's visit but not completed yet may show as past due  Also, please note that scanned in results may not display below  Preventive Screenings:  Service Recommendations Previous Testing/Comments   Colorectal Cancer Screening  · Colonoscopy    · Fecal Occult Blood Test (FOBT)/Fecal Immunochemical Test (FIT)  · Fecal DNA/Cologuard Test  · Flexible Sigmoidoscopy Age: 54-65 years old   Colonoscopy: every 10 years (May be performed more frequently if at higher risk)  OR  FOBT/FIT: every 1 year  OR  Cologuard: every 3 years  OR  Sigmoidoscopy: every 5 years  Screening may be recommended earlier than age 48 if at higher risk for colorectal cancer  Also, an individualized decision between you and your healthcare provider will decide whether screening between the ages of 74-80 would be appropriate   Colonoscopy: 01/09/2020  FOBT/FIT: Not on file  Cologuard: Not on file  Sigmoidoscopy: Not on file    Screening Current     Prostate Cancer Screening Individualized decision between patient and health care provider in men between ages of 53-78   Medicare will cover every 12 months beginning on the day after your 50th birthday PSA: No results in last 5 years     History Prostate Cancer     Hepatitis C Screening Once for adults born between [birth year] and 1965  More frequently in patients at high risk for Hepatitis C Hep C Antibody: Not on file        Diabetes Screening 1-2 times per year if you're at risk for diabetes or have pre-diabetes Fasting glucose: No results in last 5 years   A1C: No results in last 5 years    Screening Not Indicated  History Diabetes   Cholesterol Screening Once every 5 years if you don't have a lipid disorder  May order more often based on risk factors  Lipid panel: Not on file    Screening Not Indicated  History Lipid Disorder      Other Preventive Screenings Covered by Medicare:  1  Abdominal Aortic Aneurysm (AAA) Screening: covered once if your at risk  You're considered to be at risk if you have a family history of AAA or a male between the age of 73-68 who smoking at least 100 cigarettes in your lifetime  2  Lung Cancer Screening: covers low dose CT scan once per year if you meet all of the following conditions: (1) Age 50-69; (2) No signs or symptoms of lung cancer; (3) Current smoker or have quit smoking within the last 15 years; (4) You have a tobacco smoking history of at least 30 pack years (packs per day x number of years you smoked); (5) You get a written order from a healthcare provider  3  Glaucoma Screening: covered annually if you're considered high risk: (1) You have diabetes OR (2) Family history of glaucoma OR (3)  aged 48 and older OR (3)  American aged 72 and older  3  Osteoporosis Screening: covered every 2 years if you meet one of the following conditions: (1) Have a vertebral abnormality; (2) On glucocorticoid therapy for more than 3 months; (3) Have primary hyperparathyroidism; (4) On osteoporosis medications and need to assess response to drug therapy  5  HIV Screening: covered annually if you're between the age of 12-76  Also covered annually if you are younger than 13 and older than 72 with risk factors for HIV infection  For pregnant patients, it is covered up to 3 times per pregnancy      Immunizations:  Immunization Recommendations   Influenza Vaccine Annual influenza vaccination during flu season is recommended for all persons aged >= 6 months who do not have contraindications   Pneumococcal Vaccine (Prevnar and Pneumovax)  * Prevnar = PCV13  * Pneumovax = PPSV23 Adults 25-60 years old: 1-3 doses may be recommended based on certain risk factors  Adults 72 years old: Prevnar (PCV13) vaccine recommended followed by Pneumovax (PPSV23) vaccine  If already received PPSV23 since turning 65, then PCV13 recommended at least one year after PPSV23 dose  Hepatitis B Vaccine 3 dose series if at intermediate or high risk (ex: diabetes, end stage renal disease, liver disease)   Tetanus (Td) Vaccine - COST NOT COVERED BY MEDICARE PART B Following completion of primary series, a booster dose should be given every 10 years to maintain immunity against tetanus  Td may also be given as tetanus wound prophylaxis  Tdap Vaccine - COST NOT COVERED BY MEDICARE PART B Recommended at least once for all adults  For pregnant patients, recommended with each pregnancy  Shingles Vaccine (Shingrix) - COST NOT COVERED BY MEDICARE PART B  2 shot series recommended in those aged 48 and above     Health Maintenance Due:      Topic Date Due    Hepatitis C Screening  Never done    Colonoscopy Surveillance  01/09/2025    Colorectal Cancer Screening  01/09/2030     Immunizations Due:  There are no preventive care reminders to display for this patient  Advance Directives   What are advance directives? Advance directives are legal documents that state your wishes and plans for medical care  These plans are made ahead of time in case you lose your ability to make decisions for yourself  Advance directives can apply to any medical decision, such as the treatments you want, and if you want to donate organs  What are the types of advance directives? There are many types of advance directives, and each state has rules about how to use them  You may choose a combination of any of the following:  · Living will: This is a written record of the treatment you want  You can also choose which treatments you do not want, which to limit, and which to stop at a certain time   This includes surgery, medicine, IV fluid, and tube feedings  · Durable power of  for healthcare [facility]): This is a written record that states who you want to make healthcare choices for you when you are unable to make them for yourself  This person, called a proxy, is usually a family member or a friend  You may choose more than 1 proxy  · Do not resuscitate (DNR) order:  A DNR order is used in case your heart stops beating or you stop breathing  It is a request not to have certain forms of treatment, such as CPR  A DNR order may be included in other types of advance directives  · Medical directive: This covers the care that you want if you are in a coma, near death, or unable to make decisions for yourself  You can list the treatments you want for each condition  Treatment may include pain medicine, surgery, blood transfusions, dialysis, IV or tube feedings, and a ventilator (breathing machine)  · Values history: This document has questions about your views, beliefs, and how you feel and think about life  This information can help others choose the care that you would choose  Why are advance directives important? An advance directive helps you control your care  Although spoken wishes may be used, it is better to have your wishes written down  Spoken wishes can be misunderstood, or not followed  Treatments may be given even if you do not want them  An advance directive may make it easier for your family to make difficult choices about your care  Fall Prevention    Fall prevention  includes ways to make your home and other areas safer  It also includes ways you can move more carefully to prevent a fall  Health conditions that cause changes in your blood pressure, vision, or muscle strength and coordination may increase your risk for falls  Medicines may also increase your risk for falls if they make you dizzy, weak, or sleepy  Fall prevention tips:   · Stand or sit up slowly  · Use assistive devices as directed      · Wear shoes that fit well and have soles that   · Wear a personal alarm  · Stay active  · Manage your medical conditions  Home Safety Tips:  · Add items to prevent falls in the bathroom  · Keep paths clear  · Install bright lights in your home  · Keep items you use often on shelves within reach  · Paint or place reflective tape on the edges of your stairs  Weight Management   Why it is important to manage your weight:  Being overweight increases your risk of health conditions such as heart disease, high blood pressure, type 2 diabetes, and certain types of cancer  It can also increase your risk for osteoarthritis, sleep apnea, and other respiratory problems  Aim for a slow, steady weight loss  Even a small amount of weight loss can lower your risk of health problems  How to lose weight safely:  A safe and healthy way to lose weight is to eat fewer calories and get regular exercise  You can lose up about 1 pound a week by decreasing the number of calories you eat by 500 calories each day  Healthy meal plan for weight management:  A healthy meal plan includes a variety of foods, contains fewer calories, and helps you stay healthy  A healthy meal plan includes the following:  · Eat whole-grain foods more often  A healthy meal plan should contain fiber  Fiber is the part of grains, fruits, and vegetables that is not broken down by your body  Whole-grain foods are healthy and provide extra fiber in your diet  Some examples of whole-grain foods are whole-wheat breads and pastas, oatmeal, brown rice, and bulgur  · Eat a variety of vegetables every day  Include dark, leafy greens such as spinach, kale, siva greens, and mustard greens  Eat yellow and orange vegetables such as carrots, sweet potatoes, and winter squash  · Eat a variety of fruits every day  Choose fresh or canned fruit (canned in its own juice or light syrup) instead of juice  Fruit juice has very little or no fiber    · Eat low-fat dairy foods  Drink fat-free (skim) milk or 1% milk  Eat fat-free yogurt and low-fat cottage cheese  Try low-fat cheeses such as mozzarella and other reduced-fat cheeses  · Choose meat and other protein foods that are low in fat  Choose beans or other legumes such as split peas or lentils  Choose fish, skinless poultry (chicken or turkey), or lean cuts of red meat (beef or pork)  Before you cook meat or poultry, cut off any visible fat  · Use less fat and oil  Try baking foods instead of frying them  Add less fat, such as margarine, sour cream, regular salad dressing and mayonnaise to foods  Eat fewer high-fat foods  Some examples of high-fat foods include french fries, doughnuts, ice cream, and cakes  · Eat fewer sweets  Limit foods and drinks that are high in sugar  This includes candy, cookies, regular soda, and sweetened drinks  Exercise:  Exercise at least 30 minutes per day on most days of the week  Some examples of exercise include walking, biking, dancing, and swimming  You can also fit in more physical activity by taking the stairs instead of the elevator or parking farther away from stores  Ask your healthcare provider about the best exercise plan for you  © Copyright [address] 2018 Information is for End User's use only and may not be sold, redistributed or otherwise used for commercial purposes   All illustrations and images included in CareNotes® are the copyrighted property of A D A M , Inc  or [address] [place] [address]

## 2021-05-05 NOTE — PROGRESS NOTES
Assessment and Plan:     Problem List Items Addressed This Visit        Endocrine    Type 2 diabetes mellitus without complication, with long-term current use of insulin ([facility] [address] )     Needs A1C documented ASAP  No results found for: HGBA1C         Relevant Medications    metFORMIN (GLUCOPHAGE) 500 mg tablet       Cardiovascular and Mediastinum    Essential hypertension     Patient is stable with current anti-hypertensive medicine and continue to follow a low sodium diet and take current medication         Relevant Medications    spironolactone (ALDACTONE) 25 mg tablet       Genitourinary    Prostate cancer ([facility] [address] )       Other    Depression     Patient is stable  and will continue present plan of care and reassess at next routine visit         Relevant Medications    traZODone (DESYREL) 50 mg tablet    Low back pain     Patient is stable  and will continue present plan of care and reassess at next routine visit         Hyperlipidemia     Patient  is stable with current medication and we discussed a low fat low cholesterol diet  Weight loss also discussed for this will help lower cholesterol also  Recheck lipids in 6 months  Relevant Medications    atorvastatin (LIPITOR) 40 mg tablet      Other Visit Diagnoses     Need for hepatitis C screening test    -  Primary    Well adult exam            BMI Counseling: Body mass index is 36 88 kg/m²  The BMI is above normal  Nutrition recommendations include decreasing portion sizes, encouraging healthy choices of fruits and vegetables, decreasing fast food intake, consuming healthier snacks, limiting drinks that contain sugar, moderation in carbohydrate intake, increasing intake of lean protein, reducing intake of saturated and trans fat and reducing intake of cholesterol  Exercise recommendations include exercising 3-5 times per week  No pharmacotherapy was ordered  Patient referred to PCP due to patient being overweight         Depression Screening and Follow-up Plan: Patient's depression screening was positive with a PHQ-2 score of 2  Their PHQ-9 score was 3  Patient assessed for underlying major depression  Brief counseling provided and recommend additional follow-up/re-evaluation next office visit  Patient advised to follow-up with PCP for further management  Falls Plan of Care: balance, strength, and gait training instructions were provided  Home safety education provided  Preventive health issues were discussed with patient, and age appropriate screening tests were ordered as noted in patient's After Visit Summary  Personalized health advice and appropriate referrals for health education or preventive services given if needed, as noted in patient's After Visit Summary       History of Present Illness:     Patient presents for Medicare Annual Wellness visit    Patient Care Team:  Evelin Cervantes MD as PCP - General (Family Medicine)  Evelin Cervantes MD as PCP - [address] [place] [address])     Problem List:     Patient Active Problem List   Diagnosis    Prostate cancer ([address] )    Depression    Essential hypertension    Low back pain    Type 2 diabetes mellitus without complication, with long-term current use of insulin ([facility] [address] )    Hyperlipidemia      Past Medical and Surgical History:     Past Medical History:   Diagnosis Date    Cancer [facility])     PROSTATE CANCER    Colon polyp     Hyperlipidemia     PTSD (post-traumatic stress disorder)     Type 2 diabetes mellitus without complication, with long-term current use of insulin ([address] ) 5/29/2020     Past Surgical History:   Procedure Laterality Date    APPENDECTOMY        Family History:     Family History   Problem Relation Age of Onset    Diabetes Mother     Hypertension Mother     Multiple sclerosis Sister     Parkinsonism Sister       Social History:        Social History     Socioeconomic History    Marital status: /Civil Union     Spouse name: None    Number of children: None    Years of education: None    Highest education level: None   Occupational History    None   Social Needs    Financial resource strain: None    Food insecurity     Worry: None     Inability: None    Transportation needs     Medical: None     Non-medical: None   Tobacco Use    Smoking status: Never Smoker    Smokeless tobacco: Never Used   Substance and Sexual Activity    Alcohol use: Not Currently    Drug use: Never    Sexual activity: None   Lifestyle    Physical activity     Days per week: None     Minutes per session: None    Stress: None   Relationships    Social connections     Talks on phone: None     Gets together: None     Attends [employer] service: None     Active member of club or organization: None     Attends meetings of clubs or organizations: None     Relationship status: None    Intimate partner violence     Fear of current or ex partner: None     Emotionally abused: None     Physically abused: None     Forced sexual activity: None   Other Topics Concern    None   Social History Narrative    Most recent tobacco use screenin-      Do you currently or have you served in the [employer]:   Yes      If Yes, What branch of service:   [address] [place] [address]      Were you activated, into active duty, as a member of the [employer] or as a Reservist:   No      Marital status:         Exercise level: Moderate      Diet:   Regular      General stress level:   High      Alcohol intake:   None      Caffeine intake:   None      Seat belts used routinely:   Yes      Sunscreen used routinely:   Yes      Smoke alarm in home:   Yes      Advance directive:   Yes       Medications and Allergies:     Current Outpatient Medications   Medication Sig Dispense Refill    atorvastatin (LIPITOR) 40 mg tablet TAKE 20MG (ONE-HALF TABLET) BY MOUTH EVERY DAY AT 5 PM FOR CHOLESTEROL      Baclofen 5 MG TABS TAKE ONE-HALF TABLET BY MOUTH TWICE A DAY AS NEEDED FOR SPAMS/BACK PAIN   DO NOT TAKE EVERYDAY      budesonide-formoterol (SYMBICORT) 160-4 5 mcg/act inhaler INHALE 2 PUFFS BY MOUTH TWICE A DAY *RINSE MOUTH WITH WATER AND SPIT*      Diclofenac Sodium (VOLTAREN) 1 % APPLY 2GM TO UPPER EXTREMITIES TOPICALLY TWICE A DAY **USE DOSING CARD** (DO NOT EXCEED 16 GRAMS DAILY TO ANY AFFECTED JOINT OF THE LOWER EXTREMITIES  DO NOT EXCEED 8 GRAMS DAILY TO ANY AFFECTED JOINT OF THE UPPER EXTREMITIES  DO NOT EXCEED A TOTAL DOSE OF 32 GRAMS DAILY OVER ALL JOINTS)      fluticasone (FLONASE) 50 mcg/act nasal spray INSTILL 2 SPRAYS IN NOSTRIL(S) EVERY DAY FOR NASAL ALLERGIES      ipratropium-albuterol (DUO-NEB) 0 5-2 5 mg/3 mL nebulizer solution INHALE 1 VIAL IN NEBULIZER TWICE A DAY      lidocaine (LIDODERM) 5 % APPLY UP TO 3 PATCHES TOPICALLY EVERY DAY FOR PAIN (REMOVE PATCH AFTER 12 HOURS; 12 HOURS ON AND 12 HOURS OFF)      metFORMIN (GLUCOPHAGE) 500 mg tablet TAKE ONE TABLET BY MOUTH TWICE A DAY WITH MEALS FOR DIABETES      spironolactone (ALDACTONE) 25 mg tablet TAKE ONE TABLET BY MOUTH DAILY FOR HEART/BLOOD PRESSURE      traZODone (DESYREL) 50 mg tablet TAKE ONE TABLET BY MOUTH AT BEDTIME      amLODIPine (NORVASC) 10 mg tablet Take 10 mg by mouth daily      cholecalciferol (VITAMIN D3) 400 units tablet Take 400 Units by mouth daily      clonazePAM (KlonoPIN) 0 5 mg tablet TAKE 1 TABLET (0 5 MG TOTAL) BY MOUTH 2 (TWO) TIMES A DAY AS NEEDED FOR ANXIETY   60 tablet 3    cloNIDine (CATAPRES) 0 1 mg tablet TAKE 1 TABLET BY MOUTH TWICE A  tablet 1    gabapentin (NEURONTIN) 300 mg capsule       glucose blood test strip 1 each by Other route daily as needed Use as instructed      insulin glargine (LANTUS) 100 units/mL subcutaneous injection Inject 24 Units under the skin daily at bedtime      meclizine (ANTIVERT) 12 5 MG tablet Take 1 tablet (12 5 mg total) by mouth 3 (three) times a day as needed for dizziness 30 tablet 2    multivitamin (THERAGRAN) TABS Take 1 tablet by mouth daily      PARoxetine (PAXIL) 30 mg tablet Take 1 tablet (30 mg total) by mouth every morning 90 tablet 1    tamsulosin (FLOMAX) 0 4 mg Take 0 4 mg by mouth       No current facility-administered medications for this visit  Allergies   Allergen Reactions    Lisinopril Anaphylaxis    Metoprolol Bradycardia      Immunizations:     Immunization History   Administered Date(s) Administered    DT (pediatric) 05/12/1998    INFLUENZA 11/06/2020    Influenza, high dose seasonal 0 7 mL 11/16/2020    Pneumococcal 06/23/2009    Pneumococcal Conjugate 13-Valent 03/19/2018    Pneumococcal Polysaccharide PPV23 11/02/2018    SARS-CoV-2 / COVID-19 04/28/2021    Tdap 11/02/2018    Tetanus Toxoid, Unspecified 09/01/2007      Health Maintenance:         Topic Date Due    Hepatitis C Screening  Never done    Colonoscopy Surveillance  01/09/2025    Colorectal Cancer Screening  01/09/2030     There are no preventive care reminders to display for this patient  Medicare Health Risk Assessment:     Resp 16   Ht 5' 7 5" (1 715 m)   Wt 108 kg (239 lb)   BMI 36 88 kg/m²      Adam is here for his Subsequent Wellness visit  Health Risk Assessment:   Patient rates overall health as fair  Patient feels that their physical health rating is same  Patient is satisfied with their life  Eyesight was rated as same  Hearing was rated as same  Patient feels that their emotional and mental health rating is same  Patients states they are sometimes angry  Patient states they are often unusually tired/fatigued  Pain experienced in the last 7 days has been a lot  Patient's pain rating has been 8/10  Patient states that he has experienced no weight loss or gain in last 6 months  Depression Screening:   PHQ-2 Score: 2  PHQ-9 Score: 3      Fall Risk Screening:    In the past year, patient has experienced: history of falling in past year    Number of falls: 1  Injured during fall?: No    Feels unsteady when standing or walking?: Yes    Worried about falling?: No Home Safety:  Patient does not have trouble with stairs inside or outside of their home  Patient has working smoke alarms and has no working carbon monoxide detector  Home safety hazards include: none  Nutrition:   Current diet is Regular  Medications:   Patient is not currently taking any over-the-counter supplements  Patient is not able to manage medications  Activities of Daily Living (ADLs)/Instrumental Activities of Daily Living (IADLs):   Walk and transfer into and out of bed and chair?: Yes  Dress and groom yourself?: Yes    Bathe or shower yourself?: Yes    Feed yourself? Yes  Do your laundry/housekeeping?: Yes  Manage your money, pay your bills and track your expenses?: No  Make your own meals?: Yes    Do your own shopping?: Yes    Previous Hospitalizations:   Any hospitalizations or ED visits within the last 12 months?: No      Advance Care Planning:   Living will: Yes    Durable POA for healthcare:  Yes    Advanced directive: Yes    Advanced directive counseling given: Yes    Five wishes given: Yes    Patient declined ACP directive: Yes    End of Life Decisions reviewed with patient: Yes    Provider agrees with end of life decisions: Yes      Cognitive Screening:   Provider or family/friend/caregiver concerned regarding cognition?: No    PREVENTIVE SCREENINGS      Cardiovascular Screening:    General: Screening Not Indicated and History Lipid Disorder      Diabetes Screening:     General: Screening Not Indicated and History Diabetes      Colorectal Cancer Screening:     General: Screening Current      Prostate Cancer Screening:    General: History Prostate Cancer      Abdominal Aortic Aneurysm (AAA) Screening:    Risk factors include: age between 73-67 yo        Lung Cancer Screening:     General: Screening Not Indicated    Screening, Brief Intervention, and Referral to Treatment (SBIRT)    Screening  Typical number of drinks in a day: 0  Typical number of drinks in a week: 0  Interpretation: Low risk drinking behavior      Single Item Drug Screening:  How often have you used an illegal drug (including marijuana) or a prescription medication for non-medical reasons in the past year? never    Single Item Drug Screen Score: 0  Interpretation: Negative screen for possible drug use disorder      Cezar Berger MD

## 2021-05-11 ENCOUNTER — TELEMEDICINE (OUTPATIENT)
Dept: FAMILY MEDICINE CLINIC | Facility: CLINIC | Age: 76
End: 2021-05-11
Payer: COMMERCIAL

## 2021-05-11 DIAGNOSIS — R05.9 COUGH: Primary | ICD-10-CM

## 2021-05-11 PROCEDURE — 99212 OFFICE O/P EST SF 10 MIN: CPT | Performed by: NURSE PRACTITIONER

## 2021-05-11 RX ORDER — GUAIFENESIN AND CODEINE PHOSPHATE 100; 10 MG/5ML; MG/5ML
5 SOLUTION ORAL 3 TIMES DAILY PRN
Qty: 118 ML | Refills: 0 | Status: SHIPPED | OUTPATIENT
Start: 2021-05-11 | End: 2022-01-17 | Stop reason: SDUPTHER

## 2021-05-11 NOTE — PROGRESS NOTES
Virtual Brief Visit    Assessment/Plan:    Problem List Items Addressed This Visit     None                Reason for visit is No chief complaint on file  Encounter provider MARS Corea    Provider located at [address] [place] [id number] [phone]    Recent Visits  Date Type Provider Dept   05/05/21 [place] [id number] [facility] [address] recent visits within past 7 days and meeting all other requirements     Future Appointments  No visits were found meeting these conditions  Showing future appointments within next 150 days and meeting all other requirements        After connecting through telephone, the patient was identified by name and date of birth  Jolie Bello was informed that this is a telemedicine visit and that the visit is being conducted through telephone  My office door was closed  No one else was in the room  He acknowledged consent and understanding of privacy and security of the platform  The patient has agreed to participate and understands he can discontinue the visit at any time  Patient is aware this is a billable service  Subjective    Jolie Bello is a 70 y o  male   Patient was seen yesterday in urgent care for cough upper respiratory tract congestion  Patient was swab for COVID at that time and given a cough suppressant patient call stating that he cannot afford the cough suppressant it is not covered by his insurance cannot please send a cough elix in  Patient denies any shortness of breath chest pains or fever         Past Medical History:   Diagnosis Date    Cancer [facility])     PROSTATE CANCER    Colon polyp     Hyperlipidemia     PTSD (post-traumatic stress disorder)     Type 2 diabetes mellitus without complication, with long-term current use of insulin ([facility] [address] ) 5/29/2020       Past Surgical History:   Procedure Laterality Date    APPENDECTOMY         Current Outpatient Medications   Medication Sig Dispense Refill    amLODIPine (NORVASC) 10 mg tablet Take 10 mg by mouth daily      atorvastatin (LIPITOR) 40 mg tablet TAKE 20MG (ONE-HALF TABLET) BY MOUTH EVERY DAY AT 5 PM FOR CHOLESTEROL      Baclofen 5 MG TABS TAKE ONE-HALF TABLET BY MOUTH TWICE A DAY AS NEEDED FOR SPAMS/BACK PAIN  DO NOT TAKE EVERYDAY      budesonide-formoterol (SYMBICORT) 160-4 5 mcg/act inhaler INHALE 2 PUFFS BY MOUTH TWICE A DAY *RINSE MOUTH WITH WATER AND SPIT*      cholecalciferol (VITAMIN D3) 400 units tablet Take 400 Units by mouth daily      clonazePAM (KlonoPIN) 0 5 mg tablet TAKE 1 TABLET (0 5 MG TOTAL) BY MOUTH 2 (TWO) TIMES A DAY AS NEEDED FOR ANXIETY  60 tablet 3    cloNIDine (CATAPRES) 0 1 mg tablet TAKE 1 TABLET BY MOUTH TWICE A  tablet 1    Diclofenac Sodium (VOLTAREN) 1 % APPLY 2GM TO UPPER EXTREMITIES TOPICALLY TWICE A DAY **USE DOSING CARD** (DO NOT EXCEED 16 GRAMS DAILY TO ANY AFFECTED JOINT OF THE LOWER EXTREMITIES  DO NOT EXCEED 8 GRAMS DAILY TO ANY AFFECTED JOINT OF THE UPPER EXTREMITIES   DO NOT EXCEED A TOTAL DOSE OF 32 GRAMS DAILY OVER ALL JOINTS)      fluticasone (FLONASE) 50 mcg/act nasal spray INSTILL 2 SPRAYS IN NOSTRIL(S) EVERY DAY FOR NASAL ALLERGIES      glucose blood test strip 1 each by Other route daily as needed Use as instructed      insulin glargine (LANTUS) 100 units/mL subcutaneous injection Inject 24 Units under the skin daily at bedtime      ipratropium-albuterol (DUO-NEB) 0 5-2 5 mg/3 mL nebulizer solution INHALE 1 VIAL IN NEBULIZER TWICE A DAY      lidocaine (LIDODERM) 5 % APPLY UP TO 3 PATCHES TOPICALLY EVERY DAY FOR PAIN (REMOVE PATCH AFTER 12 HOURS; 12 HOURS ON AND 12 HOURS OFF)      meclizine (ANTIVERT) 12 5 MG tablet Take 1 tablet (12 5 mg total) by mouth 3 (three) times a day as needed for dizziness 30 tablet 2    metFORMIN (GLUCOPHAGE) 500 mg tablet TAKE ONE TABLET BY MOUTH TWICE A DAY WITH MEALS FOR DIABETES      multivitamin (THERAGRAN) TABS Take 1 tablet by mouth daily      PARoxetine (PAXIL) 30 mg tablet Take 1 tablet (30 mg total) by mouth every morning 90 tablet 1    spironolactone (ALDACTONE) 25 mg tablet TAKE ONE TABLET BY MOUTH DAILY FOR HEART/BLOOD PRESSURE      tamsulosin (FLOMAX) 0 4 mg Take 0 4 mg by mouth      traZODone (DESYREL) 50 mg tablet TAKE ONE TABLET BY MOUTH AT BEDTIME       No current facility-administered medications for this visit  Allergies   Allergen Reactions    Lisinopril Anaphylaxis    Metoprolol Bradycardia       Review of Systems   Constitutional: Negative for fever  HENT: Positive for congestion and rhinorrhea  Respiratory: Positive for cough  Negative for shortness of breath  Cardiovascular: Negative for chest pain  There were no vitals filed for this visit  I spent 10 minutes directly with the patient during this visit    Spring Samuel acknowledges that he has consented to an online visit or consultation  He understands that the online visit is based solely on information provided by him, and that, in the absence of a face-to-face physical evaluation by the physician, the diagnosis he receives is both limited and provisional in terms of accuracy and completeness  This is not intended to replace a full medical face-to-face evaluation by the physician  Sarah Farley understands and accepts these terms

## 2021-06-06 ENCOUNTER — TELEPHONE (OUTPATIENT)
Dept: FAMILY MEDICINE CLINIC | Facility: CLINIC | Age: 76
End: 2021-06-06

## 2021-06-07 NOTE — TELEPHONE ENCOUNTER
His labs were done at the [place]  They are under the media tab, first document (other facility documents)

## 2021-07-10 DIAGNOSIS — I10 ESSENTIAL HYPERTENSION: ICD-10-CM

## 2021-07-10 RX ORDER — CLONIDINE HYDROCHLORIDE 0.1 MG/1
TABLET ORAL
Qty: 180 TABLET | Refills: 1 | Status: SHIPPED | OUTPATIENT
Start: 2021-07-10

## 2021-07-15 ENCOUNTER — TELEPHONE (OUTPATIENT)
Dept: FAMILY MEDICINE CLINIC | Facility: CLINIC | Age: 76
End: 2021-07-15

## 2021-07-15 NOTE — TELEPHONE ENCOUNTER
Pt called and stated that he needs a script for diabetic shoes and also needs a script for a new nebulizer  Pt requests to be called when ready and will

## 2021-07-24 DIAGNOSIS — F32.A DEPRESSION, UNSPECIFIED DEPRESSION TYPE: ICD-10-CM

## 2021-07-24 RX ORDER — PAROXETINE 30 MG/1
TABLET, FILM COATED ORAL
Qty: 90 TABLET | Refills: 1 | Status: SHIPPED | OUTPATIENT
Start: 2021-07-24

## 2021-08-06 ENCOUNTER — OFFICE VISIT (OUTPATIENT)
Dept: FAMILY MEDICINE CLINIC | Facility: CLINIC | Age: 76
End: 2021-08-06
Payer: COMMERCIAL

## 2021-08-06 VITALS
DIASTOLIC BLOOD PRESSURE: 76 MMHG | SYSTOLIC BLOOD PRESSURE: 130 MMHG | HEART RATE: 92 BPM | WEIGHT: 233 LBS | TEMPERATURE: 97.9 F | BODY MASS INDEX: 35.31 KG/M2 | OXYGEN SATURATION: 97 % | RESPIRATION RATE: 18 BRPM | HEIGHT: 68 IN

## 2021-08-06 DIAGNOSIS — N18.4 STAGE 4 CHRONIC KIDNEY DISEASE (HCC): ICD-10-CM

## 2021-08-06 DIAGNOSIS — C61 PROSTATE CANCER (HCC): ICD-10-CM

## 2021-08-06 DIAGNOSIS — F33.40 RECURRENT MAJOR DEPRESSIVE DISORDER, IN REMISSION (HCC): ICD-10-CM

## 2021-08-06 DIAGNOSIS — Z79.4 TYPE 2 DIABETES MELLITUS WITHOUT COMPLICATION, WITH LONG-TERM CURRENT USE OF INSULIN (HCC): ICD-10-CM

## 2021-08-06 DIAGNOSIS — E11.9 TYPE 2 DIABETES MELLITUS WITHOUT COMPLICATION, WITH LONG-TERM CURRENT USE OF INSULIN (HCC): ICD-10-CM

## 2021-08-06 DIAGNOSIS — E66.01 OBESITY, MORBID (HCC): ICD-10-CM

## 2021-08-06 DIAGNOSIS — Z11.59 NEED FOR HEPATITIS C SCREENING TEST: Primary | ICD-10-CM

## 2021-08-06 DIAGNOSIS — E78.2 MIXED HYPERLIPIDEMIA: ICD-10-CM

## 2021-08-06 DIAGNOSIS — I10 ESSENTIAL HYPERTENSION: ICD-10-CM

## 2021-08-06 PROCEDURE — 99214 OFFICE O/P EST MOD 30 MIN: CPT | Performed by: FAMILY MEDICINE

## 2021-08-06 NOTE — ASSESSMENT & PLAN NOTE
Patient is stable with current meds and discussed a low carb diet  Pt  recommended to see eye doctor and foot doctor for routine screening as per protocol  Recheck A1C  and Cr in 3 months  Patient questions answered today about this condtion    Lab Results   Component Value Date    HGBA1C 5 1 05/03/2021

## 2021-08-06 NOTE — PROGRESS NOTES
Depression Screening and Follow-up Plan: Patient assessed for underlying major depression  Brief counseling provided and recommend additional follow-up/re-evaluation next office visit  Patient advised to follow-up with PCP for further management  [facility] Plan of Care: balance, strength, and gait training instructions were provided  Home safety education provided  Assessment/Plan:         Problem List Items Addressed This Visit        Endocrine    Type 2 diabetes mellitus without complication, with long-term current use of insulin ([facility] )     Patient is stable with current meds and discussed a low carb diet  Pt  recommended to see eye doctor and foot doctor for routine screening as per protocol  Recheck A1C  and Cr in 3 months  Patient questions answered today about this condtion  Lab Results   Component Value Date    HGBA1C 5 1 05/03/2021            Relevant Orders    Hemoglobin A1C    Comprehensive metabolic panel       Cardiovascular and Mediastinum    Essential hypertension     Patient is stable with current anti-hypertensive medicine and continue to follow a low sodium diet and take current medication  All questions about this condition were answered today  Genitourinary    Prostate cancer [facility])     Patient is stable  and will continue present plan of care and reassess at next routine visit  All questions about this problem from patient were answered today  Stage 4 chronic kidney disease ([facility] )       Other    Depression     Patient to continue utilizing medical therapy as well and counseling sources as applicable for condition  If  suicidal thought or fear of suicide to contact 911 and seek help immediately  Meds reviewed and patient questions answered today         Hyperlipidemia     Patient  is stable with current medication and we discussed a low fat low cholesterol diet  Weight loss also discussed for this will help lower cholesterol also  Recheck lipids in 6 months           Obesity, morbid ([address] )     Patient to increase exercise and partake of a diet with less calories to promote  weight loss           Other Visit Diagnoses     Need for hepatitis C screening test    -  Primary    Relevant Orders    Hepatitis C Antibody ([facility])            Subjective:      Patient ID: Heaven Iniugez is a 70 y o  male  This is a 79-year-old male here today for checkup for type 2 diabetes hypertension prostate cancer PTSD hyperlipidemia and is doing well  The patient had lab work done recently at the [place] as doing very well with sugar  Paperwork for the [place] services were filled out for today  Patient is doing good with his medications this point does not need any refills  The following portions of the patient's history were reviewed and updated as appropriate:   Past Medical History:  He has a past medical history of Cancer ([facility] ), Colon polyp, Hyperlipidemia, PTSD (post-traumatic stress disorder), and Type 2 diabetes mellitus without complication, with long-term current use of insulin ([facility] [address] ) (5/29/2020)  ,  _______________________________________________________________________  Medical Problems:  does not have any pertinent problems on file ,  _______________________________________________________________________  Past Surgical History:   has a past surgical history that includes Appendectomy  ,  _______________________________________________________________________  Family History:  family history includes Diabetes in his mother; Hypertension in his mother; Multiple sclerosis in his sister; Parkinsonism in his sister  ,  _______________________________________________________________________  Social History:   reports that he has never smoked  He has never used smokeless tobacco  He reports previous alcohol use  He reports that he does not use drugs  ,  _______________________________________________________________________  Allergies:  is allergic to lisinopril and metoprolol     _______________________________________________________________________  Current Outpatient Medications   Medication Sig Dispense Refill    amLODIPine (NORVASC) 10 mg tablet Take 10 mg by mouth daily      atorvastatin (LIPITOR) 40 mg tablet TAKE 20MG (ONE-HALF TABLET) BY MOUTH EVERY DAY AT 5 PM FOR CHOLESTEROL      Baclofen 5 MG TABS TAKE ONE-HALF TABLET BY MOUTH TWICE A DAY AS NEEDED FOR SPAMS/BACK PAIN  DO NOT TAKE EVERYDAY      budesonide-formoterol (SYMBICORT) 160-4 5 mcg/act inhaler INHALE 2 PUFFS BY MOUTH TWICE A DAY *RINSE MOUTH WITH WATER AND SPIT*      cholecalciferol (VITAMIN D3) 400 units tablet Take 400 Units by mouth daily      clonazePAM (KlonoPIN) 0 5 mg tablet TAKE 1 TABLET (0 5 MG TOTAL) BY MOUTH 2 (TWO) TIMES A DAY AS NEEDED FOR ANXIETY  60 tablet 3    cloNIDine (CATAPRES) 0 1 mg tablet TAKE 1 TABLET BY MOUTH TWICE A  tablet 1    Diclofenac Sodium (VOLTAREN) 1 % APPLY 2GM TO UPPER EXTREMITIES TOPICALLY TWICE A DAY **USE DOSING CARD** (DO NOT EXCEED 16 GRAMS DAILY TO ANY AFFECTED JOINT OF THE LOWER EXTREMITIES  DO NOT EXCEED 8 GRAMS DAILY TO ANY AFFECTED JOINT OF THE UPPER EXTREMITIES   DO NOT EXCEED A TOTAL DOSE OF 32 GRAMS DAILY OVER ALL JOINTS)      fluticasone (FLONASE) 50 mcg/act nasal spray INSTILL 2 SPRAYS IN NOSTRIL(S) EVERY DAY FOR NASAL ALLERGIES      glucose blood test strip 1 each by Other route daily as needed Use as instructed      guaifenesin-codeine (GUAIFENESIN AC) 100-10 MG/5ML liquid Take 5 mL by mouth 3 (three) times a day as needed for cough 118 mL 0    insulin glargine (LANTUS) 100 units/mL subcutaneous injection Inject 24 Units under the skin daily at bedtime      ipratropium-albuterol (DUO-NEB) 0 5-2 5 mg/3 mL nebulizer solution INHALE 1 VIAL IN NEBULIZER TWICE A DAY      lidocaine (LIDODERM) 5 % APPLY UP TO 3 PATCHES TOPICALLY EVERY DAY FOR PAIN (REMOVE PATCH AFTER 12 HOURS; 12 HOURS ON AND 12 HOURS OFF)      meclizine (ANTIVERT) 12 5 MG tablet Take 1 tablet (12 5 mg total) by mouth 3 (three) times a day as needed for dizziness 30 tablet 2    metFORMIN (GLUCOPHAGE) 500 mg tablet TAKE ONE TABLET BY MOUTH TWICE A DAY WITH MEALS FOR DIABETES      multivitamin (THERAGRAN) TABS Take 1 tablet by mouth daily      PARoxetine (PAXIL) 30 mg tablet TAKE 1 TABLET BY MOUTH EVERY MORNING 90 tablet 1    spironolactone (ALDACTONE) 25 mg tablet TAKE ONE TABLET BY MOUTH DAILY FOR HEART/BLOOD PRESSURE      tamsulosin (FLOMAX) 0 4 mg Take 0 4 mg by mouth      traZODone (DESYREL) 50 mg tablet TAKE ONE TABLET BY MOUTH AT BEDTIME       No current facility-administered medications for this visit      _______________________________________________________________________  Review of Systems   Constitutional: Negative for activity change, appetite change, chills, fatigue, fever and unexpected weight change  HENT: Negative for congestion, ear pain, hearing loss, mouth sores, postnasal drip, sinus pressure, sinus pain, sneezing and sore throat  Respiratory: Negative for apnea, cough, shortness of breath and wheezing  Cardiovascular: Negative for chest pain, palpitations and leg swelling  Gastrointestinal: Negative for abdominal pain, constipation, diarrhea, nausea and vomiting  Endocrine: Negative for cold intolerance and heat intolerance  Genitourinary: Negative for dysuria, frequency and hematuria  Musculoskeletal: Negative for arthralgias, back pain, gait problem, joint swelling and neck pain  Skin: Negative for rash  Neurological: Negative for dizziness, weakness and numbness  Hematological: Does not bruise/bleed easily  Psychiatric/Behavioral: Negative for agitation, behavioral problems, confusion, hallucinations and sleep disturbance  The patient is not nervous/anxious            Objective:  Vitals:    08/06/21 1312   BP: 130/76   BP Location: Left arm   Patient Position: Sitting   Cuff Size: Large   Pulse: 92   Resp: 18   Temp: 97 9 °F (36 6 °C)   SpO2: 97%   Weight: 106 kg (233 lb)   Height: 5' 7 5" (1 715 m)     Body mass index is 35 95 kg/m²  Physical Exam  Vitals and nursing note reviewed  Constitutional:       Appearance: He is well-developed  HENT:      Head: Normocephalic and atraumatic  Nose: Nose normal       Mouth/Throat:      Mouth: Mucous membranes are moist    Eyes:      General: No scleral icterus  Conjunctiva/sclera: Conjunctivae normal       Pupils: Pupils are equal, round, and reactive to light  Neck:      Thyroid: No thyromegaly  Cardiovascular:      Rate and Rhythm: Normal rate and regular rhythm  Heart sounds: Normal heart sounds  Pulmonary:      Effort: Pulmonary effort is normal  No respiratory distress  Breath sounds: Normal breath sounds  No wheezing  Abdominal:      General: Bowel sounds are normal       Palpations: Abdomen is soft  Tenderness: There is no abdominal tenderness  There is no guarding or rebound  Musculoskeletal:         General: Normal range of motion  Cervical back: Normal range of motion and neck supple  Skin:     General: Skin is warm and dry  Findings: No rash  Neurological:      Mental Status: He is alert and oriented to person, place, and time  Psychiatric:         Mood and Affect: Mood normal          Behavior: Behavior normal          Thought Content:  Thought content normal          Judgment: Judgment normal

## 2021-08-09 ENCOUNTER — TELEPHONE (OUTPATIENT)
Dept: ADMINISTRATIVE | Facility: OTHER | Age: 76
End: 2021-08-09

## 2021-08-09 NOTE — TELEPHONE ENCOUNTER
----- Message from Darci Cerda sent at 8/6/2021  2:32 PM EDT -----  Regarding: care gap request Colonoscopy  08/06/21 2:32 PM    Hello, our patient attached above has had CRC: Colonoscopy completed/performed  Please assist in updating the patient chart by making an External outreach to Dr Sidney Moss with the [facility] facility located in [place], [place]  The date of service is March or April of 2021      Thank you,  Nora Simpson  [facility] [address] [facility]

## 2021-08-09 NOTE — TELEPHONE ENCOUNTER
Upon review of the In Basket request and the patient's chart, initial outreach has been made via fax, please see Contacts section for details       Thank you  Cedrick Vyas MA

## 2021-08-09 NOTE — LETTER
Procedure Request Form: Colonoscopy      Date Requested: 21  Patient: Carey Donovan  Patient : 1945   Referring Provider: Shefali Moreno, MD        Date of Procedure ______________________________       The above patient has informed us that they have completed their   most recent Colonoscopy at your facility  Please complete   this form and attach all corresponding procedure reports/results  Comments __________________________________________________________  ____________________________________________________________________  ____________________________________________________________________  ____________________________________________________________________    Facility Completing Procedure _________________________________________    Form Completed By (print name) _______________________________________      Signature __________________________________________________________      These reports are needed for  compliance    Please fax this completed form and a copy of the procedure report to our office located at [place] [address] as soon as possible to [phone] attention Marina: Phone [phone]    We thank you for your assistance in treating our mutual patient     (Sent to [facility])

## 2021-08-09 NOTE — LETTER
Procedure Request Form: Colonoscopy      Date Requested: 21  Patient: Jelani Camargo  Patient : 1945   Referring Provider: Joaquín Nath, MD        Date of Procedure ______________________________       The above patient has informed us that they have completed their   most recent Colonoscopy at your facility  Please complete   this form and attach all corresponding procedure reports/results  Comments __________________________________________________________  ____________________________________________________________________  ____________________________________________________________________  ____________________________________________________________________    Facility Completing Procedure _________________________________________    Form Completed By (print name) _______________________________________      Signature __________________________________________________________      These reports are needed for  compliance    Please fax this completed form and a copy of the procedure report to our office located at [place] [address] as soon as possible to [phone] attention Marina: Phone [phone]    We thank you for your assistance in treating our mutual patient     (sent to [facility])

## 2021-08-13 NOTE — TELEPHONE ENCOUNTER
Upon review of the In Basket request we were able to locate, review, and update the patient chart as requested for CRC: Colonoscopy  Any additional questions or concerns should be emailed to the Practice Liaisons via [email or web address] email, please do not reply via In Basket      Thank you  Cedrick Vyas MA

## 2021-08-13 NOTE — TELEPHONE ENCOUNTER
As a follow-up, a second attempt has been made for outreach via fax, please see Contacts section for details      Thank you  Denita Talley MA

## 2021-08-18 ENCOUNTER — TELEMEDICINE (OUTPATIENT)
Dept: FAMILY MEDICINE CLINIC | Facility: CLINIC | Age: 76
End: 2021-08-18
Payer: COMMERCIAL

## 2021-08-18 ENCOUNTER — VBI (OUTPATIENT)
Dept: ADMINISTRATIVE | Facility: OTHER | Age: 76
End: 2021-08-18

## 2021-08-18 DIAGNOSIS — Z79.4 TYPE 2 DIABETES MELLITUS WITHOUT COMPLICATION, WITH LONG-TERM CURRENT USE OF INSULIN (HCC): ICD-10-CM

## 2021-08-18 DIAGNOSIS — E11.9 TYPE 2 DIABETES MELLITUS WITHOUT COMPLICATION, WITH LONG-TERM CURRENT USE OF INSULIN (HCC): ICD-10-CM

## 2021-08-18 DIAGNOSIS — J98.01 BRONCHOSPASM: Primary | ICD-10-CM

## 2021-08-18 PROCEDURE — 99213 OFFICE O/P EST LOW 20 MIN: CPT | Performed by: FAMILY MEDICINE

## 2021-08-18 NOTE — ASSESSMENT & PLAN NOTE
Pt  With B/l foot pain and needs protective shoes for he has diabetes  Pt has good control of sugar with an A1C of 5 1  Pt also with callouses on feet    Lab Results   Component Value Date    HGBA1C 5 1 05/03/2021

## 2021-08-18 NOTE — PROGRESS NOTES
Virtual Brief Visit    Verification of patient location:    Patient is located in the following state in which I hold an active license [place]      Assessment/Plan:    Problem List Items Addressed This Visit        Endocrine    Type 2 diabetes mellitus without complication, with long-term current use of insulin ([address] )     Pt  With B/l foot pain and needs protective shoes for he has diabetes  Pt has good control of sugar with an A1C of 5 1  Pt also with callouses on feet  Lab Results   Component Value Date    HGBA1C 5 1 05/03/2021               Other    Bronchospasm - Primary     Pt needs use of a nebulizer to help with delivery of medicine  Depression Screening and Follow-up Plan: Patient assessed for underlying major depression  Brief counseling provided and recommend additional follow-up/re-evaluation next office visit  Patient advised to follow-up with PCP for further management  Falls Plan of Care: balance, strength, and gait training instructions were provided  Home safety education provided  Reason for visit is   Chief Complaint   Patient presents with    Virtual Brief Visit        Encounter provider Omayra Elmore MD    Provider located at [address] [facility] [id number] [phone]    Recent Visits  No visits were found meeting these conditions  Showing recent visits within past 7 days and meeting all other requirements  Today's Visits  Date Type Provider Dept   08/18/21 Telemedicine mOayra Elmore, [facility][1] today's visits and meeting all other requirements  Future Appointments  No visits were found meeting these conditions  Showing future appointments within next 150 days and meeting all other requirements       After connecting through telephone, the patient was identified by name and date of birth   Jenn Carrasco was informed that this is a telemedicine visit and that the visit is being conducted through telephone  My office door was closed  No one else was in the room  He acknowledged consent and understanding of privacy and security of the platform  The patient has agreed to participate and understands he can discontinue the visit at any time  Patient is aware this is a billable service  Derick Feldman is a 78 y o  male   This 33-year-old black male for virtual visit about 3 problems  First problem is he needs documentation in the notes say that he needed nebulizer for the get that through his insurance and he definitely needs it was ordered at his last visit  Patient episodes of bronchospasm that needs bronchodilation and nebulizer that was the medicine in to work better for him  Patient also needs documentation he has diabetes and needs to have diabetic shoes  Patient thought IV with the person who order that I told him he needs to go to a podiatrist and they will need my information in his chart to document his need for diabetic shoes will get that for him  The last thing is he is having some problems with back pain  Patient seen some in the started on muscle relaxant and anti-inflammatory and he is now out of starting physical therapy he has had the medicines not working but restarted physical therapy  I told to continue with that and the care from the prior steroid he is getting without make his sugars go low high his diabetes         Past Medical History:   Diagnosis Date    Cancer [facility])     PROSTATE CANCER    Colon polyp     Hyperlipidemia     PTSD (post-traumatic stress disorder)     Type 2 diabetes mellitus without complication, with long-term current use of insulin ([facility] ) 5/29/2020       Past Surgical History:   Procedure Laterality Date    APPENDECTOMY         Current Outpatient Medications   Medication Sig Dispense Refill    amLODIPine (NORVASC) 10 mg tablet Take 10 mg by mouth daily      atorvastatin (LIPITOR) 40 mg tablet TAKE 20MG (ONE-HALF TABLET) BY MOUTH EVERY DAY AT 5 PM FOR CHOLESTEROL      Baclofen 5 MG TABS TAKE ONE-HALF TABLET BY MOUTH TWICE A DAY AS NEEDED FOR SPAMS/BACK PAIN  DO NOT TAKE EVERYDAY      budesonide-formoterol (SYMBICORT) 160-4 5 mcg/act inhaler INHALE 2 PUFFS BY MOUTH TWICE A DAY *RINSE MOUTH WITH WATER AND SPIT*      cholecalciferol (VITAMIN D3) 400 units tablet Take 400 Units by mouth daily      clonazePAM (KlonoPIN) 0 5 mg tablet TAKE 1 TABLET (0 5 MG TOTAL) BY MOUTH 2 (TWO) TIMES A DAY AS NEEDED FOR ANXIETY  60 tablet 3    cloNIDine (CATAPRES) 0 1 mg tablet TAKE 1 TABLET BY MOUTH TWICE A  tablet 1    Diclofenac Sodium (VOLTAREN) 1 % APPLY 2GM TO UPPER EXTREMITIES TOPICALLY TWICE A DAY **USE DOSING CARD** (DO NOT EXCEED 16 GRAMS DAILY TO ANY AFFECTED JOINT OF THE LOWER EXTREMITIES  DO NOT EXCEED 8 GRAMS DAILY TO ANY AFFECTED JOINT OF THE UPPER EXTREMITIES   DO NOT EXCEED A TOTAL DOSE OF 32 GRAMS DAILY OVER ALL JOINTS)      fluticasone (FLONASE) 50 mcg/act nasal spray INSTILL 2 SPRAYS IN NOSTRIL(S) EVERY DAY FOR NASAL ALLERGIES      glucose blood test strip 1 each by Other route daily as needed Use as instructed      guaifenesin-codeine (GUAIFENESIN AC) 100-10 MG/5ML liquid Take 5 mL by mouth 3 (three) times a day as needed for cough 118 mL 0    insulin glargine (LANTUS) 100 units/mL subcutaneous injection Inject 24 Units under the skin daily at bedtime      ipratropium-albuterol (DUO-NEB) 0 5-2 5 mg/3 mL nebulizer solution INHALE 1 VIAL IN NEBULIZER TWICE A DAY      lidocaine (LIDODERM) 5 % APPLY UP TO 3 PATCHES TOPICALLY EVERY DAY FOR PAIN (REMOVE PATCH AFTER 12 HOURS; 12 HOURS ON AND 12 HOURS OFF)      meclizine (ANTIVERT) 12 5 MG tablet Take 1 tablet (12 5 mg total) by mouth 3 (three) times a day as needed for dizziness 30 tablet 2    metFORMIN (GLUCOPHAGE) 500 mg tablet TAKE ONE TABLET BY MOUTH TWICE A DAY WITH MEALS FOR DIABETES      multivitamin (THERAGRAN) TABS Take 1 tablet by mouth daily      PARoxetine (PAXIL) 30 mg tablet TAKE 1 TABLET BY MOUTH EVERY MORNING 90 tablet 1    spironolactone (ALDACTONE) 25 mg tablet TAKE ONE TABLET BY MOUTH DAILY FOR HEART/BLOOD PRESSURE      tamsulosin (FLOMAX) 0 4 mg Take 0 4 mg by mouth      traZODone (DESYREL) 50 mg tablet TAKE ONE TABLET BY MOUTH AT BEDTIME       No current facility-administered medications for this visit  Allergies   Allergen Reactions    Lisinopril Anaphylaxis    Metoprolol Bradycardia       Review of Systems   Musculoskeletal: Positive for back pain  There were no vitals filed for this visit  I spent 15 minutes directly with the patient during this visit    [address] [place] [address] verbally agrees to participate in [employer]  Pt is aware that [employer] could be limited without vital signs or the ability to perform a full hands-on physical Romayne Eriksson understands he or the provider may request at any time to terminate the video visit and request the patient to seek care or treatment in person

## 2021-08-23 ENCOUNTER — TELEPHONE (OUTPATIENT)
Dept: FAMILY MEDICINE CLINIC | Facility: CLINIC | Age: 76
End: 2021-08-23

## 2021-08-23 NOTE — TELEPHONE ENCOUNTER
Isela from [employer] called asking for a status update on orders that were sent over on Friday   [phone]

## 2021-08-26 ENCOUNTER — TELEPHONE (OUTPATIENT)
Dept: FAMILY MEDICINE CLINIC | Facility: CLINIC | Age: 76
End: 2021-08-26

## 2021-08-26 NOTE — TELEPHONE ENCOUNTER
Spoke with patient  He reports he had a fall over the weekend and went to the hospital for pain he's having all in his left side for his shoulder, elbow, hip, knee and buttock  He was given percocets and told to call to follow up  Patient is still having pain and is currently in a sling  Advised provider may need an office visit please advise

## 2021-08-26 NOTE — TELEPHONE ENCOUNTER
Attempted to make an appointment for patient  Appointment that I offered was taken while I was on the phone  Nothing available for the rest of the week advised patient to go to the ER if he is in need of more medication for pain to be assessed

## 2021-09-12 ENCOUNTER — NURSE TRIAGE (OUTPATIENT)
Dept: OTHER | Facility: OTHER | Age: 76
End: 2021-09-12

## 2021-09-12 DIAGNOSIS — F41.9 ANXIETY: ICD-10-CM

## 2021-09-12 RX ORDER — CLONAZEPAM 0.5 MG/1
0.5 TABLET ORAL 2 TIMES DAILY PRN
Qty: 60 TABLET | Refills: 3 | Status: SHIPPED | OUTPATIENT
Start: 2021-09-12

## 2021-09-12 NOTE — TELEPHONE ENCOUNTER
Regarding: Anxiety  ----- Message from Robert Ma sent at 9/12/2021 11:24 AM EDT -----  "I get very severe panic attacks without my medication  I've been sweating really bad, which then causes me to feel like I can't really breathe  I didn't take my clonazepam yesterday, and don't have any for today   I wanted to know if there is a chance that I can get a dose or two to get me through until tomorrow "

## 2021-09-12 NOTE — TELEPHONE ENCOUNTER
Reason for Disposition   [1] Prescription refill request for ESSENTIAL medicine (i e , likelihood of harm to patient if not taken) AND [2] triager unable to refill per department policy    Answer Assessment - Initial Assessment Questions  1  NAME of MEDICATION: "What medicine are you calling about?"      Clonazepam  2  QUESTION: "What is your question?" (e g , medication refill, side effect)      I get anxiety ans I am out of my medication   Can I get a short supply? 3  PRESCRIBING HCP: "Who prescribed it?" Reason: if prescribed by specialist, call should be referred to that group  [facility]  4  SYMPTOMS: "Do you have any symptoms?"      Sweating, anxiety like he can not breath  The medication alleviates that feeling   5  SEVERITY: If symptoms are present, ask "Are they mild, moderate or severe?"      Im all out and need a refill just in case the anxiety comes back today      Protocols used: MEDICATION QUESTION CALL-ADULT-

## 2021-10-15 ENCOUNTER — TELEPHONE (OUTPATIENT)
Dept: FAMILY MEDICINE CLINIC | Facility: CLINIC | Age: 76
End: 2021-10-15

## 2021-11-04 ENCOUNTER — TELEPHONE (OUTPATIENT)
Dept: FAMILY MEDICINE CLINIC | Facility: CLINIC | Age: 76
End: 2021-11-04

## 2021-11-12 ENCOUNTER — APPOINTMENT (OUTPATIENT)
Dept: LAB | Facility: CLINIC | Age: 76
End: 2021-11-12
Payer: COMMERCIAL

## 2021-11-12 ENCOUNTER — OFFICE VISIT (OUTPATIENT)
Dept: FAMILY MEDICINE CLINIC | Facility: CLINIC | Age: 76
End: 2021-11-12
Payer: COMMERCIAL

## 2021-11-12 VITALS
HEIGHT: 67 IN | BODY MASS INDEX: 35.25 KG/M2 | HEART RATE: 96 BPM | WEIGHT: 224.6 LBS | RESPIRATION RATE: 18 BRPM | SYSTOLIC BLOOD PRESSURE: 130 MMHG | OXYGEN SATURATION: 97 % | DIASTOLIC BLOOD PRESSURE: 76 MMHG | TEMPERATURE: 97.3 F

## 2021-11-12 DIAGNOSIS — G89.29 CHRONIC PAIN OF BOTH SHOULDERS: ICD-10-CM

## 2021-11-12 DIAGNOSIS — M89.8X1 SHOULDER BLADE PAIN: ICD-10-CM

## 2021-11-12 DIAGNOSIS — I10 ESSENTIAL HYPERTENSION: ICD-10-CM

## 2021-11-12 DIAGNOSIS — Z11.59 NEED FOR HEPATITIS C SCREENING TEST: Primary | ICD-10-CM

## 2021-11-12 DIAGNOSIS — Z79.4 TYPE 2 DIABETES MELLITUS WITHOUT COMPLICATION, WITH LONG-TERM CURRENT USE OF INSULIN (HCC): ICD-10-CM

## 2021-11-12 DIAGNOSIS — M25.511 CHRONIC PAIN OF BOTH SHOULDERS: ICD-10-CM

## 2021-11-12 DIAGNOSIS — F33.40 RECURRENT MAJOR DEPRESSIVE DISORDER, IN REMISSION (HCC): ICD-10-CM

## 2021-11-12 DIAGNOSIS — I42.9 CARDIOMYOPATHY, UNSPECIFIED TYPE (HCC): ICD-10-CM

## 2021-11-12 DIAGNOSIS — C61 PROSTATE CANCER (HCC): ICD-10-CM

## 2021-11-12 DIAGNOSIS — Z11.59 NEED FOR HEPATITIS C SCREENING TEST: ICD-10-CM

## 2021-11-12 DIAGNOSIS — J44.9 CHRONIC OBSTRUCTIVE PULMONARY DISEASE, UNSPECIFIED COPD TYPE (HCC): ICD-10-CM

## 2021-11-12 DIAGNOSIS — E78.2 MIXED HYPERLIPIDEMIA: ICD-10-CM

## 2021-11-12 DIAGNOSIS — E11.9 TYPE 2 DIABETES MELLITUS WITHOUT COMPLICATION, WITH LONG-TERM CURRENT USE OF INSULIN (HCC): ICD-10-CM

## 2021-11-12 DIAGNOSIS — E66.01 OBESITY, MORBID (HCC): ICD-10-CM

## 2021-11-12 DIAGNOSIS — Z23 IMMUNIZATION DUE: ICD-10-CM

## 2021-11-12 DIAGNOSIS — M25.512 CHRONIC PAIN OF BOTH SHOULDERS: ICD-10-CM

## 2021-11-12 LAB
ALBUMIN SERPL BCP-MCNC: 4.2 G/DL (ref 3.5–5)
ALP SERPL-CCNC: 50 U/L (ref 46–116)
ALT SERPL W P-5'-P-CCNC: 23 U/L (ref 12–78)
ANION GAP SERPL CALCULATED.3IONS-SCNC: 9 MMOL/L (ref 4–13)
AST SERPL W P-5'-P-CCNC: 8 U/L (ref 5–45)
BASOPHILS # BLD AUTO: 0.06 THOUSANDS/ΜL (ref 0–0.1)
BASOPHILS NFR BLD AUTO: 1 % (ref 0–1)
BILIRUB SERPL-MCNC: 0.6 MG/DL (ref 0.2–1)
BUN SERPL-MCNC: 52 MG/DL (ref 5–25)
CALCIUM SERPL-MCNC: 10.2 MG/DL (ref 8.3–10.1)
CHLORIDE SERPL-SCNC: 106 MMOL/L (ref 100–108)
CHOLEST SERPL-MCNC: 139 MG/DL (ref 50–200)
CO2 SERPL-SCNC: 25 MMOL/L (ref 21–32)
CREAT SERPL-MCNC: 2.27 MG/DL (ref 0.6–1.3)
EOSINOPHIL # BLD AUTO: 0.14 THOUSAND/ΜL (ref 0–0.61)
EOSINOPHIL NFR BLD AUTO: 2 % (ref 0–6)
ERYTHROCYTE [DISTWIDTH] IN BLOOD BY AUTOMATED COUNT: 13 % (ref 11.6–15.1)
EST. AVERAGE GLUCOSE BLD GHB EST-MCNC: 114 MG/DL
GFR SERPL CREATININE-BSD FRML MDRD: 32 ML/MIN/1.73SQ M
GLUCOSE P FAST SERPL-MCNC: 111 MG/DL (ref 65–99)
HBA1C MFR BLD: 5.6 %
HCT VFR BLD AUTO: 36.8 % (ref 36.5–49.3)
HCV AB SER QL: NORMAL
HDLC SERPL-MCNC: 70 MG/DL
HGB BLD-MCNC: 12 G/DL (ref 12–17)
IMM GRANULOCYTES # BLD AUTO: 0.05 THOUSAND/UL (ref 0–0.2)
IMM GRANULOCYTES NFR BLD AUTO: 1 % (ref 0–2)
LDLC SERPL CALC-MCNC: 55 MG/DL (ref 0–100)
LYMPHOCYTES # BLD AUTO: 1.32 THOUSANDS/ΜL (ref 0.6–4.47)
LYMPHOCYTES NFR BLD AUTO: 21 % (ref 14–44)
MAGNESIUM SERPL-MCNC: 2.4 MG/DL (ref 1.6–2.6)
MCH RBC QN AUTO: 30 PG (ref 26.8–34.3)
MCHC RBC AUTO-ENTMCNC: 32.6 G/DL (ref 31.4–37.4)
MCV RBC AUTO: 92 FL (ref 82–98)
MONOCYTES # BLD AUTO: 0.46 THOUSAND/ΜL (ref 0.17–1.22)
MONOCYTES NFR BLD AUTO: 7 % (ref 4–12)
NEUTROPHILS # BLD AUTO: 4.32 THOUSANDS/ΜL (ref 1.85–7.62)
NEUTS SEG NFR BLD AUTO: 68 % (ref 43–75)
NRBC BLD AUTO-RTO: 0 /100 WBCS
PLATELET # BLD AUTO: 161 THOUSANDS/UL (ref 149–390)
PMV BLD AUTO: 11.1 FL (ref 8.9–12.7)
POTASSIUM SERPL-SCNC: 4.6 MMOL/L (ref 3.5–5.3)
PROT SERPL-MCNC: 7.9 G/DL (ref 6.4–8.2)
RBC # BLD AUTO: 4 MILLION/UL (ref 3.88–5.62)
SODIUM SERPL-SCNC: 140 MMOL/L (ref 136–145)
TRIGL SERPL-MCNC: 70 MG/DL
URATE SERPL-MCNC: 6.6 MG/DL (ref 4.2–8)
WBC # BLD AUTO: 6.35 THOUSAND/UL (ref 4.31–10.16)

## 2021-11-12 PROCEDURE — 3044F HG A1C LEVEL LT 7.0%: CPT | Performed by: FAMILY MEDICINE

## 2021-11-12 PROCEDURE — 3008F BODY MASS INDEX DOCD: CPT | Performed by: FAMILY MEDICINE

## 2021-11-12 PROCEDURE — 85025 COMPLETE CBC W/AUTO DIFF WBC: CPT

## 2021-11-12 PROCEDURE — 83036 HEMOGLOBIN GLYCOSYLATED A1C: CPT

## 2021-11-12 PROCEDURE — 90662 IIV NO PRSV INCREASED AG IM: CPT | Performed by: FAMILY MEDICINE

## 2021-11-12 PROCEDURE — 80061 LIPID PANEL: CPT

## 2021-11-12 PROCEDURE — 80053 COMPREHEN METABOLIC PANEL: CPT

## 2021-11-12 PROCEDURE — 83735 ASSAY OF MAGNESIUM: CPT

## 2021-11-12 PROCEDURE — 3725F SCREEN DEPRESSION PERFORMED: CPT | Performed by: FAMILY MEDICINE

## 2021-11-12 PROCEDURE — G0008 ADMIN INFLUENZA VIRUS VAC: HCPCS | Performed by: FAMILY MEDICINE

## 2021-11-12 PROCEDURE — 99214 OFFICE O/P EST MOD 30 MIN: CPT | Performed by: FAMILY MEDICINE

## 2021-11-12 PROCEDURE — 84550 ASSAY OF BLOOD/URIC ACID: CPT

## 2021-11-12 PROCEDURE — 86803 HEPATITIS C AB TEST: CPT

## 2021-11-12 PROCEDURE — 36415 COLL VENOUS BLD VENIPUNCTURE: CPT

## 2021-11-12 PROCEDURE — 3078F DIAST BP <80 MM HG: CPT | Performed by: FAMILY MEDICINE

## 2021-11-12 PROCEDURE — 1160F RVW MEDS BY RX/DR IN RCRD: CPT | Performed by: FAMILY MEDICINE

## 2021-11-12 RX ORDER — FLUTICASONE PROPIONATE AND SALMETEROL XINAFOATE 230; 21 UG/1; UG/1
2 AEROSOL, METERED RESPIRATORY (INHALATION) 2 TIMES DAILY
Qty: 12 G | Refills: 3 | Status: SHIPPED | OUTPATIENT
Start: 2021-11-12

## 2021-11-12 RX ORDER — CYCLOBENZAPRINE HCL 10 MG
10 TABLET ORAL 3 TIMES DAILY PRN
Qty: 90 TABLET | Refills: 1 | Status: SHIPPED | OUTPATIENT
Start: 2021-11-12

## 2021-11-19 ENCOUNTER — VBI (OUTPATIENT)
Dept: ADMINISTRATIVE | Facility: OTHER | Age: 76
End: 2021-11-19

## 2021-12-16 ENCOUNTER — HOSPITAL ENCOUNTER (OUTPATIENT)
Dept: NON INVASIVE DIAGNOSTICS | Facility: CLINIC | Age: 76
Discharge: HOME | End: 2021-12-16
Payer: COMMERCIAL

## 2021-12-16 VITALS
HEART RATE: 97 BPM | WEIGHT: 224 LBS | SYSTOLIC BLOOD PRESSURE: 130 MMHG | DIASTOLIC BLOOD PRESSURE: 76 MMHG | BODY MASS INDEX: 35.16 KG/M2 | HEIGHT: 67 IN

## 2021-12-16 DIAGNOSIS — I42.9 CARDIOMYOPATHY, UNSPECIFIED TYPE (HCC): ICD-10-CM

## 2021-12-16 LAB
AORTIC ROOT: 3.8 CM
APICAL FOUR CHAMBER EJECTION FRACTION: 65 %
AV LVOT PEAK GRADIENT: 4 MMHG
AV PEAK GRADIENT: 9 MMHG
AV REGURGITATION PRESSURE HALF TIME: 0.59 MS
E WAVE DECELERATION TIME: 189 MS
FRACTIONAL SHORTENING: 26 % (ref 28–44)
INTERVENTRICULAR SEPTUM IN DIASTOLE (PARASTERNAL SHORT AXIS VIEW): 1.2 CM
LEFT ATRIUM AREA SYSTOLE SINGLE PLANE A4C: 11.7 CM2
LEFT INTERNAL DIMENSION IN SYSTOLE: 3.5 CM (ref 2.1–4)
LEFT VENTRICULAR INTERNAL DIMENSION IN DIASTOLE: 4.7 CM (ref 6.46–9.62)
LEFT VENTRICULAR POSTERIOR WALL IN END DIASTOLE: 1 CM
LEFT VENTRICULAR STROKE VOLUME: 53 ML
MV E'TISSUE VEL-LAT: 7 CM/S
MV PEAK A VEL: 1.07 M/S
MV PEAK E VEL: 62 CM/S
MV STENOSIS PRESSURE HALF TIME: 0 MS
RIGHT ATRIUM AREA SYSTOLE A4C: 10.4 CM2
RIGHT VENTRICLE ID DIMENSION: 2.8 CM
SL CV AV DECELERATION TIME RETROGRADE: 2021 MS
SL CV AV PEAK GRADIENT RETROGRADE: 44 MMHG
SL CV PED ECHO LEFT VENTRICLE DIASTOLIC VOLUME (MOD BIPLANE) 2D: 102 ML
SL CV PED ECHO LEFT VENTRICLE SYSTOLIC VOLUME (MOD BIPLANE) 2D: 50 ML
TRICUSPID VALVE PEAK REGURGITATION VELOCITY: 2.61 M/S
TRICUSPID VALVE S': 0.6 CM/S
TV PEAK GRADIENT: 27 MMHG
Z-SCORE OF LEFT VENTRICULAR DIMENSION IN END SYSTOLE: -5.17

## 2021-12-16 PROCEDURE — 93306 TTE W/DOPPLER COMPLETE: CPT | Performed by: INTERNAL MEDICINE

## 2021-12-16 PROCEDURE — 93306 TTE W/DOPPLER COMPLETE: CPT

## 2022-01-13 ENCOUNTER — TELEPHONE (OUTPATIENT)
Dept: FAMILY MEDICINE CLINIC | Facility: CLINIC | Age: 77
End: 2022-01-13

## 2022-01-13 DIAGNOSIS — J06.9 URI WITH COUGH AND CONGESTION: Primary | ICD-10-CM

## 2022-01-13 PROCEDURE — 87636 SARSCOV2 & INF A&B AMP PRB: CPT | Performed by: FAMILY MEDICINE

## 2022-01-13 NOTE — TELEPHONE ENCOUNTER
Pt left a message on the message line stating that he has a cough, runny nosed and stuffed up and wants meds  I called back and got voice male asking patient to call be to go over symptoms

## 2022-01-13 NOTE — TELEPHONE ENCOUNTER
Spoke with patient  He hasn't had a recent covid test with the symptoms he is having and he is aware we would need to rule out covid and are unable to prescribe medication until results are back  Please place order for covid test  Last seen in November

## 2022-01-14 LAB
FLUAV RNA RESP QL NAA+PROBE: NEGATIVE
FLUBV RNA RESP QL NAA+PROBE: NEGATIVE
SARS-COV-2 RNA RESP QL NAA+PROBE: NEGATIVE

## 2022-01-15 ENCOUNTER — NURSE TRIAGE (OUTPATIENT)
Dept: OTHER | Facility: OTHER | Age: 77
End: 2022-01-15

## 2022-01-15 DIAGNOSIS — R05.9 COUGH: Primary | ICD-10-CM

## 2022-01-15 RX ORDER — HYDROCODONE POLISTIREX AND CHLORPHENIRAMINE POLISTIREX 10; 8 MG/5ML; MG/5ML
5 SUSPENSION, EXTENDED RELEASE ORAL EVERY 12 HOURS PRN
Qty: 120 ML | Refills: 0 | Status: SHIPPED | OUTPATIENT
Start: 2022-01-15 | End: 2022-04-22

## 2022-01-15 NOTE — TELEPHONE ENCOUNTER
Covid test negative  On call reached via TC  Provider will call in meds after 5 pm  Patient made aware  Reason for Disposition   [1] Continuous (nonstop) coughing interferes with work or school AND [2] no improvement using cough treatment per Care Advice    Answer Assessment - Initial Assessment Questions  1  ONSET: "When did the cough begin?"       This past week     2  SEVERITY: "How bad is the cough today?"       Moderate     3  SPUTUM: "Describe the color of your sputum" (none, dry cough; clear, white, yellow, green)      Yellow thick     4  HEMOPTYSIS: "Are you coughing up any blood?" If so ask: "How much?" (flecks, streaks, tablespoons, etc )      None     5  DIFFICULTY BREATHING: "Are you having difficulty breathing?" If Yes, ask: "How bad is it?" (e g , mild, moderate, severe)     - MILD: No SOB at rest, mild SOB with walking, speaks normally in sentences, can lay down, no retractions, pulse < 100      - MODERATE: SOB at rest, SOB with minimal exertion and prefers to sit, cannot lie down flat, speaks in phrases, mild retractions, audible wheezing, pulse 100-120      - SEVERE: Very SOB at rest, speaks in single words, struggling to breathe, sitting hunched forward, retractions, pulse > 120       No     6  FEVER: "Do you have a fever?" If Yes, ask: "What is your temperature, how was it measured, and when did it start?"      No     7  CARDIAC HISTORY: "Do you have any history of heart disease?" (e g , heart attack, congestive heart failure)       No     8  LUNG HISTORY: "Do you have any history of lung disease?"  (e g , pulmonary embolus, asthma, emphysema)      No     9  PE RISK FACTORS: "Do you have a history of blood clots?" (or: recent major surgery, recent prolonged travel, bedridden)      No     10  OTHER SYMPTOMS: "Do you have any other symptoms?" (e g , runny nose, wheezing, chest pain)        Runny nose    11   TRAVEL: "Have you traveled out of the country in the last month?" (e g , travel history, exposures)        No    Protocols used: COUGH - ACUTE PRODUCTIVE-ADULT-AH

## 2022-01-17 ENCOUNTER — TELEPHONE (OUTPATIENT)
Dept: FAMILY MEDICINE CLINIC | Facility: CLINIC | Age: 77
End: 2022-01-17

## 2022-01-17 DIAGNOSIS — R05.9 COUGH: ICD-10-CM

## 2022-01-17 RX ORDER — GUAIFENESIN AND CODEINE PHOSPHATE 100; 10 MG/5ML; MG/5ML
5 SOLUTION ORAL 3 TIMES DAILY PRN
Qty: 118 ML | Refills: 0 | Status: SHIPPED | OUTPATIENT
Start: 2022-01-17 | End: 2022-04-22 | Stop reason: ALTCHOICE

## 2022-01-17 NOTE — TELEPHONE ENCOUNTER
[facility]/PHARMACY #[id number]- [facility] VARGHESE OREILLY - [address] [place] [address]

## 2022-01-17 NOTE — TELEPHONE ENCOUNTER
There was no discussion about any antibiotic  I called in a cough med  If he is that sick and has a covid test -, he needs an OV to assess for antibiotic use

## 2022-02-14 ENCOUNTER — VBI (OUTPATIENT)
Dept: ADMINISTRATIVE | Facility: OTHER | Age: 77
End: 2022-02-14

## 2022-04-22 ENCOUNTER — OFFICE VISIT (OUTPATIENT)
Dept: FAMILY MEDICINE CLINIC | Facility: CLINIC | Age: 77
End: 2022-04-22
Payer: COMMERCIAL

## 2022-04-22 VITALS
BODY MASS INDEX: 37.35 KG/M2 | HEIGHT: 67 IN | TEMPERATURE: 97.3 F | DIASTOLIC BLOOD PRESSURE: 80 MMHG | OXYGEN SATURATION: 97 % | HEART RATE: 94 BPM | SYSTOLIC BLOOD PRESSURE: 130 MMHG | WEIGHT: 238 LBS

## 2022-04-22 DIAGNOSIS — I42.9 CARDIOMYOPATHY, UNSPECIFIED TYPE (HCC): ICD-10-CM

## 2022-04-22 DIAGNOSIS — E26.09 PRIMARY HYPERALDOSTERONISM (HCC): Primary | ICD-10-CM

## 2022-04-22 DIAGNOSIS — E11.40 TYPE 2 DIABETES MELLITUS WITH DIABETIC NEUROPATHY, WITHOUT LONG-TERM CURRENT USE OF INSULIN (HCC): ICD-10-CM

## 2022-04-22 DIAGNOSIS — C61 PROSTATE CANCER (HCC): ICD-10-CM

## 2022-04-22 DIAGNOSIS — I10 ESSENTIAL HYPERTENSION: ICD-10-CM

## 2022-04-22 DIAGNOSIS — E78.2 MIXED HYPERLIPIDEMIA: ICD-10-CM

## 2022-04-22 DIAGNOSIS — E66.01 OBESITY, MORBID (HCC): ICD-10-CM

## 2022-04-22 DIAGNOSIS — E11.9 TYPE 2 DIABETES MELLITUS WITHOUT COMPLICATION, WITH LONG-TERM CURRENT USE OF INSULIN (HCC): ICD-10-CM

## 2022-04-22 DIAGNOSIS — N18.4 CHRONIC KIDNEY DISEASE, STAGE 4 (SEVERE) (HCC): ICD-10-CM

## 2022-04-22 DIAGNOSIS — F33.40 RECURRENT MAJOR DEPRESSIVE DISORDER, IN REMISSION (HCC): ICD-10-CM

## 2022-04-22 DIAGNOSIS — Z79.4 TYPE 2 DIABETES MELLITUS WITHOUT COMPLICATION, WITH LONG-TERM CURRENT USE OF INSULIN (HCC): ICD-10-CM

## 2022-04-22 DIAGNOSIS — J44.9 CHRONIC OBSTRUCTIVE PULMONARY DISEASE, UNSPECIFIED COPD TYPE (HCC): ICD-10-CM

## 2022-04-22 PROCEDURE — 3075F SYST BP GE 130 - 139MM HG: CPT | Performed by: FAMILY MEDICINE

## 2022-04-22 PROCEDURE — 3079F DIAST BP 80-89 MM HG: CPT | Performed by: FAMILY MEDICINE

## 2022-04-22 PROCEDURE — 3066F NEPHROPATHY DOC TX: CPT | Performed by: FAMILY MEDICINE

## 2022-04-22 PROCEDURE — 1160F RVW MEDS BY RX/DR IN RCRD: CPT | Performed by: FAMILY MEDICINE

## 2022-04-22 PROCEDURE — 99214 OFFICE O/P EST MOD 30 MIN: CPT | Performed by: FAMILY MEDICINE

## 2022-04-22 PROCEDURE — 3008F BODY MASS INDEX DOCD: CPT | Performed by: FAMILY MEDICINE

## 2022-04-22 RX ORDER — AMOXICILLIN AND CLAVULANATE POTASSIUM 500; 125 MG/1; MG/1
TABLET, FILM COATED ORAL
COMMUNITY
Start: 2022-04-12

## 2022-04-22 RX ORDER — GABAPENTIN 300 MG/1
300 CAPSULE ORAL 3 TIMES DAILY
Qty: 270 CAPSULE | Refills: 3 | Status: SHIPPED | OUTPATIENT
Start: 2022-04-22 | End: 2022-07-22 | Stop reason: SDUPTHER

## 2022-04-22 RX ORDER — ASPIRIN 325 MG
325 TABLET ORAL DAILY
COMMUNITY

## 2022-04-22 RX ORDER — AMLODIPINE BESYLATE 10 MG/1
10 TABLET ORAL DAILY
Qty: 90 TABLET | Refills: 1 | Status: SHIPPED | OUTPATIENT
Start: 2022-04-22

## 2022-04-22 NOTE — ASSESSMENT & PLAN NOTE
Patient is stable with current meds and discussed a low carb diet  Pt  recommended to see eye doctor and foot doctor for routine screening as per protocol  Recheck A1C  and Cr in 3 months  Patient questions answered today about this condtion    Lab Results   Component Value Date    HGBA1C 5 6 11/12/2021

## 2022-04-22 NOTE — PATIENT INSTRUCTIONS

## 2022-04-22 NOTE — PROGRESS NOTES
BMI Counseling: There is no height or weight on file to calculate BMI  The BMI is above normal  Nutrition recommendations include decreasing portion sizes, encouraging healthy choices of fruits and vegetables, decreasing fast food intake, consuming healthier snacks, limiting drinks that contain sugar, moderation in carbohydrate intake, increasing intake of lean protein, reducing intake of saturated and trans fat and reducing intake of cholesterol  Exercise recommendations include exercising 3-5 times per week  No pharmacotherapy was ordered  Patient referred to PCP  Rationale for BMI follow-up plan is due to patient being overweight or obese  Depression Screening and Follow-up Plan: Patient assessed for underlying major depression  Brief counseling provided and recommend additional follow-up/re-evaluation next office visit  Patient advised to follow-up with PCP for further management  Falls Plan of Care: balance, strength, and gait training instructions were provided  Assessment/Plan:         Problem List Items Addressed This Visit        Endocrine    Type 2 diabetes mellitus without complication, with long-term current use of insulin ([facility] )     Patient is stable with current meds and discussed a low carb diet  Pt  recommended to see eye doctor and foot doctor for routine screening as per protocol  Recheck A1C  and Cr in 3 months  Patient questions answered today about this condtion  Lab Results   Component Value Date    HGBA1C 5 6 11/12/2021            Primary hyperaldosteronism ([facility] ) - Primary     Patient is stable  and will continue present plan of care and reassess at next routine visit  All questions about this problem from patient were answered today              Respiratory    Chronic obstructive pulmonary disease, unspecified COPD type ([facility] )    Relevant Medications    Budesonide-Formoterol Fumarate (SYMBICORT IN)       Cardiovascular and Mediastinum    Essential hypertension     Patient is stable with current anti-hypertensive medicine and continue to follow a low sodium diet and take current medication  All questions about this condition were answered today  Relevant Medications    amLODIPine (NORVASC) 10 mg tablet    Cardiomyopathy, unspecified type ([facility] )    Relevant Medications    amLODIPine (NORVASC) 10 mg tablet       Genitourinary    Prostate cancer [facility])     Patient is stable  and will continue present plan of care and reassess at next routine visit  All questions about this problem from patient were answered today  Other    Depression     Patient to continue utilizing medical therapy as well and counseling sources as applicable for condition  If  suicidal thought or fear of suicide to contact 911 and seek help immediately  Meds reviewed and patient questions answered today         Hyperlipidemia     Patient  is stable with current medication and we discussed a low fat low cholesterol diet  Weight loss also discussed for this will help lower cholesterol also  Recheck lipids in 6 months  Obesity, morbid ([facility] )     Patient to increase exercise and partake of a diet with less calories to promote  weight loss           Other Visit Diagnoses     Type 2 diabetes mellitus with diabetic neuropathy, without long-term current use of insulin (HCC)        Relevant Medications    gabapentin (Neurontin) 300 mg capsule    Chronic kidney disease, stage 4 (severe) (HCC)                Subjective:      Patient ID: Piotr Garcia is a 78 y o  male  This is a 62-year-old male here today for checkup on multiple medical problems patient history of prostate cancer type 2 diabetes PTSD hypertension hyperlipidemia  The patient gets his lab work done at the [address] [facility] where he gets most of his care but he does follow-up with his here  Patient had an A1c done there that was 5 6  Patient's wife will see about getting his information and his lab results for our charting purposes also    Patient gets his prescriptions refilled at the [facility] also  The following portions of the patient's history were reviewed and updated as appropriate:   Past Medical History:  He has a past medical history of Cancer ([address] ), Colon polyp, Hyperlipidemia, PTSD (post-traumatic stress disorder), and Type 2 diabetes mellitus without complication, with long-term current use of insulin ([facility] ) (5/29/2020)  ,  _______________________________________________________________________  Medical Problems:  does not have any pertinent problems on file ,  _______________________________________________________________________  Past Surgical History:   has a past surgical history that includes Appendectomy  ,  _______________________________________________________________________  Family History:  family history includes Diabetes in his mother; Hypertension in his mother; Multiple sclerosis in his sister; Parkinsonism in his sister  ,  _______________________________________________________________________  Social History:   reports that he has never smoked  He has never used smokeless tobacco  He reports previous alcohol use  He reports that he does not use drugs  ,  _______________________________________________________________________  Allergies:  is allergic to lisinopril and metoprolol     _______________________________________________________________________  Current Outpatient Medications   Medication Sig Dispense Refill    amLODIPine (NORVASC) 10 mg tablet Take 1 tablet (10 mg total) by mouth daily 90 tablet 1    amoxicillin-clavulanate (AUGMENTIN) 500-125 mg per tablet TAKE 1 TABLET BY MOUTH TWICE A DAY UNTIL ALL PILLS ARE TAKEN      aspirin 325 mg tablet Take 325 mg by mouth daily      atorvastatin (LIPITOR) 40 mg tablet TAKE 20MG (ONE-HALF TABLET) BY MOUTH EVERY DAY AT 5 PM FOR CHOLESTEROL      Budesonide-Formoterol Fumarate (SYMBICORT IN) Inhale      cholecalciferol (VITAMIN D3) 400 units tablet Take 400 Units by mouth daily      clonazePAM (KlonoPIN) 0 5 mg tablet Take 1 tablet (0 5 mg total) by mouth 2 (two) times a day as needed for anxiety 60 tablet 3    cloNIDine (CATAPRES) 0 1 mg tablet TAKE 1 TABLET BY MOUTH TWICE A  tablet 1    cyclobenzaprine (FLEXERIL) 10 mg tablet Take 1 tablet (10 mg total) by mouth 3 (three) times a day as needed for muscle spasms 90 tablet 1    Diclofenac Sodium (VOLTAREN) 1 % APPLY 2GM TO UPPER EXTREMITIES TOPICALLY TWICE A DAY **USE DOSING CARD** (DO NOT EXCEED 16 GRAMS DAILY TO ANY AFFECTED JOINT OF THE LOWER EXTREMITIES  DO NOT EXCEED 8 GRAMS DAILY TO ANY AFFECTED JOINT OF THE UPPER EXTREMITIES  DO NOT EXCEED A TOTAL DOSE OF 32 GRAMS DAILY OVER ALL JOINTS)      glucose blood test strip 1 each by Other route daily as needed Use as instructed      insulin glargine (LANTUS) 100 units/mL subcutaneous injection Inject 24 Units under the skin daily at bedtime      ipratropium-albuterol (DUO-NEB) 0 5-2 5 mg/3 mL nebulizer solution INHALE 1 VIAL IN NEBULIZER TWICE A DAY      lidocaine (LIDODERM) 5 % APPLY UP TO 3 PATCHES TOPICALLY EVERY DAY FOR PAIN (REMOVE PATCH AFTER 12 HOURS; 12 HOURS ON AND 12 HOURS OFF)      meclizine (ANTIVERT) 12 5 MG tablet Take 1 tablet (12 5 mg total) by mouth 3 (three) times a day as needed for dizziness 30 tablet 2    multivitamin (THERAGRAN) TABS Take 1 tablet by mouth daily      PARoxetine (PAXIL) 30 mg tablet TAKE 1 TABLET BY MOUTH EVERY MORNING 90 tablet 1    spironolactone (ALDACTONE) 25 mg tablet TAKE ONE TABLET BY MOUTH DAILY FOR HEART/BLOOD PRESSURE      traZODone (DESYREL) 50 mg tablet TAKE ONE TABLET BY MOUTH AT BEDTIME      fluticasone (FLONASE) 50 mcg/act nasal spray INSTILL 2 SPRAYS IN NOSTRIL(S) EVERY DAY FOR NASAL ALLERGIES (Patient not taking: Reported on 4/22/2022)      fluticasone-salmeterol (Advair HFA) 230-21 MCG/ACT inhaler Inhale 2 puffs 2 (two) times a day Rinse mouth after use   (Patient not taking: Reported on 4/22/2022 ) 12 g 3    gabapentin (Neurontin) 300 mg capsule Take 1 capsule (300 mg total) by mouth 3 (three) times a day 270 capsule 3     No current facility-administered medications for this visit      _______________________________________________________________________  Review of Systems      Objective:  Vitals:    04/22/22 1446   BP: 130/80   BP Location: Left arm   Patient Position: Sitting   Cuff Size: Large   Pulse: 94   Temp: (!) 97 3 °F (36 3 °C)   TempSrc: Skin   SpO2: 97%   Weight: 108 kg (238 lb)   Height: 5' 7" (1 702 m)     Body mass index is 37 28 kg/m²       Physical Exam

## 2022-06-08 DIAGNOSIS — F41.9 ANXIETY: Primary | ICD-10-CM

## 2022-06-08 RX ORDER — TRAZODONE HYDROCHLORIDE 50 MG/1
50 TABLET ORAL
Qty: 30 TABLET | Refills: 1 | Status: SHIPPED | OUTPATIENT
Start: 2022-06-08 | End: 2022-06-30

## 2022-06-30 DIAGNOSIS — F41.9 ANXIETY: ICD-10-CM

## 2022-06-30 RX ORDER — TRAZODONE HYDROCHLORIDE 50 MG/1
TABLET ORAL
Qty: 90 TABLET | Refills: 1 | Status: SHIPPED | OUTPATIENT
Start: 2022-06-30

## 2022-07-01 ENCOUNTER — VBI (OUTPATIENT)
Dept: ADMINISTRATIVE | Facility: OTHER | Age: 77
End: 2022-07-01

## 2022-07-14 ENCOUNTER — RA CDI HCC (OUTPATIENT)
Dept: OTHER | Facility: HOSPITAL | Age: 77
End: 2022-07-14

## 2022-07-14 NOTE — PROGRESS NOTES
[facility]  coding opportunities     E11 22     Chart Reviewed number of suggestions sent to Provider: 1     Patients Insurance     Medicare Insurance: [address] [place] [address]

## 2022-07-22 ENCOUNTER — OFFICE VISIT (OUTPATIENT)
Dept: FAMILY MEDICINE CLINIC | Facility: CLINIC | Age: 77
End: 2022-07-22
Payer: COMMERCIAL

## 2022-07-22 VITALS
BODY MASS INDEX: 37.98 KG/M2 | WEIGHT: 242 LBS | HEIGHT: 67 IN | DIASTOLIC BLOOD PRESSURE: 78 MMHG | HEART RATE: 88 BPM | RESPIRATION RATE: 16 BRPM | OXYGEN SATURATION: 97 % | SYSTOLIC BLOOD PRESSURE: 120 MMHG | TEMPERATURE: 97.3 F

## 2022-07-22 DIAGNOSIS — E78.2 MIXED HYPERLIPIDEMIA: ICD-10-CM

## 2022-07-22 DIAGNOSIS — I10 ESSENTIAL HYPERTENSION: ICD-10-CM

## 2022-07-22 DIAGNOSIS — E11.40 TYPE 2 DIABETES MELLITUS WITH DIABETIC NEUROPATHY, WITHOUT LONG-TERM CURRENT USE OF INSULIN (HCC): ICD-10-CM

## 2022-07-22 DIAGNOSIS — J44.9 CHRONIC OBSTRUCTIVE PULMONARY DISEASE, UNSPECIFIED COPD TYPE (HCC): ICD-10-CM

## 2022-07-22 DIAGNOSIS — E11.9 TYPE 2 DIABETES MELLITUS WITHOUT COMPLICATION, WITH LONG-TERM CURRENT USE OF INSULIN (HCC): Primary | ICD-10-CM

## 2022-07-22 DIAGNOSIS — Z00.00 WELL ADULT EXAM: ICD-10-CM

## 2022-07-22 DIAGNOSIS — Z79.4 TYPE 2 DIABETES MELLITUS WITHOUT COMPLICATION, WITH LONG-TERM CURRENT USE OF INSULIN (HCC): Primary | ICD-10-CM

## 2022-07-22 DIAGNOSIS — F33.40 RECURRENT MAJOR DEPRESSIVE DISORDER, IN REMISSION (HCC): ICD-10-CM

## 2022-07-22 DIAGNOSIS — N18.4 STAGE 4 CHRONIC KIDNEY DISEASE (HCC): ICD-10-CM

## 2022-07-22 DIAGNOSIS — E66.01 OBESITY, MORBID (HCC): ICD-10-CM

## 2022-07-22 PROCEDURE — 1125F AMNT PAIN NOTED PAIN PRSNT: CPT | Performed by: FAMILY MEDICINE

## 2022-07-22 PROCEDURE — 99214 OFFICE O/P EST MOD 30 MIN: CPT | Performed by: FAMILY MEDICINE

## 2022-07-22 PROCEDURE — 1100F PTFALLS ASSESS-DOCD GE2>/YR: CPT | Performed by: FAMILY MEDICINE

## 2022-07-22 PROCEDURE — G0439 PPPS, SUBSEQ VISIT: HCPCS | Performed by: FAMILY MEDICINE

## 2022-07-22 PROCEDURE — 82043 UR ALBUMIN QUANTITATIVE: CPT | Performed by: FAMILY MEDICINE

## 2022-07-22 PROCEDURE — 3078F DIAST BP <80 MM HG: CPT | Performed by: FAMILY MEDICINE

## 2022-07-22 PROCEDURE — 3074F SYST BP LT 130 MM HG: CPT | Performed by: FAMILY MEDICINE

## 2022-07-22 PROCEDURE — 1160F RVW MEDS BY RX/DR IN RCRD: CPT | Performed by: FAMILY MEDICINE

## 2022-07-22 PROCEDURE — 1170F FXNL STATUS ASSESSED: CPT | Performed by: FAMILY MEDICINE

## 2022-07-22 PROCEDURE — 3066F NEPHROPATHY DOC TX: CPT | Performed by: FAMILY MEDICINE

## 2022-07-22 PROCEDURE — 3725F SCREEN DEPRESSION PERFORMED: CPT | Performed by: FAMILY MEDICINE

## 2022-07-22 PROCEDURE — 3288F FALL RISK ASSESSMENT DOCD: CPT | Performed by: FAMILY MEDICINE

## 2022-07-22 PROCEDURE — 82570 ASSAY OF URINE CREATININE: CPT | Performed by: FAMILY MEDICINE

## 2022-07-22 RX ORDER — GABAPENTIN 300 MG/1
300 CAPSULE ORAL 3 TIMES DAILY
Qty: 270 CAPSULE | Refills: 3 | Status: SHIPPED | OUTPATIENT
Start: 2022-07-22 | End: 2022-08-21

## 2022-07-22 NOTE — PROGRESS NOTES
Depression Screening and Follow-up Plan: Patient assessed for underlying major depression  Brief counseling provided and recommend additional follow-up/re-evaluation next office visit  Patient advised to follow-up with PCP for further management  Falls Plan of Care: balance, strength, and gait training instructions were provided  Home safety education provided  Assessment/Plan:         Problem List Items Addressed This Visit        Endocrine    Type 2 diabetes mellitus without complication, with long-term current use of insulin ([place] [id number] ) - Primary     Patient is stable with current meds and discussed a low carb diet  Pt  recommended to see eye doctor and foot doctor for routine screening as per protocol  Recheck A1C  and Cr in 3 months  Patient questions answered today about this condtion  Lab Results   Component Value Date    HGBA1C 5 6 11/12/2021            Relevant Orders    Hemoglobin A1C       Respiratory    Chronic obstructive pulmonary disease, unspecified COPD type ([place] [address] )     Continue with current inhalation therapy to maximize lung function  AVOID ALL TOBACCO USAGE and please obtain flu vaccine  yearly  Cardiovascular and Mediastinum    Essential hypertension     Patient is stable with current anti-hypertensive medicine and continue to follow a low sodium diet and take current medication  All questions about this condition were answered today  Genitourinary    Stage 4 chronic kidney disease ([place] [address] )     Lab Results   Component Value Date    EGFR 32 11/12/2021    CREATININE 2 27 (H) 11/12/2021   Pt to avoid NSAIDs and any IV dyes  Patient to follow up eoither with nephrology or  with us for  further  monitoring of  renal function  Other    Depression     Patient to continue utilizing medical therapy as well and counseling sources as applicable for condition  If  suicidal thought or fear of suicide to contact 911 and seek help immediately   Meds reviewed and patient questions answered today         Hyperlipidemia     Patient  is stable with current medication and we discussed a low fat low cholesterol diet  Weight loss also discussed for this will help lower cholesterol also  Recheck lipids in 6 months  Relevant Orders    Comprehensive metabolic panel    Lipid Panel with Direct LDL reflex    Obesity, morbid ([address] )     Patient to increase exercise and partake of a diet with less calories to promote  weight loss           Other Visit Diagnoses     Well adult exam        Type 2 diabetes mellitus with diabetic neuropathy, without long-term current use of insulin (HCC)        Relevant Medications    gabapentin (Neurontin) 300 mg capsule            Subjective:      Patient ID: Rossana Tellez is a 78 y o  male  This 26-year-old male here today for his Medicare wellness checkup on multiple medical problems patient with type 2 diabetes posttraumatic stress disorder COPD and chronic disease kidney stage 4  Patient had lab work done at [facility] were going to see about obtaining that lab work for him I will order his cholesterol  Patient is a needs refills on his Neurontin today  Also discussed about good exercise program to try and help with some of his pain issues as well as trying to do some of the weight which will help all of his other medical problems  Patient's shoes and socks removed  Right Foot/Ankle   Right Foot Inspection  Skin Exam: skin normal, dry skin, callus and callus  Skin not intact, no warmth, no erythema, no maceration, no abnormal color, no pre-ulcer and no ulcer  Toe Exam: ROM and strength within normal limits  No tenderness    Sensory   Vibration: intact  Proprioception: intact  Monofilament testing: intact    Vascular  Capillary refills: < 3 seconds  The right DP pulse is 2+  The right PT pulse is 2+  Left Foot/Ankle  Left Foot Inspection  Skin Exam: dry skin and callus   Skin not intact, no warmth, no erythema, no maceration, normal color, no pre-ulcer and no ulcer  Toe Exam: No swelling and no tenderness  Sensory   Vibration: intact  Proprioception: intact  Monofilament testing: intact    Vascular  Capillary refills: < 3 seconds  The left DP pulse is 2+  The left PT pulse is 2+  Assign Risk Category  No deformity present  No loss of protective sensation  No weak pulses  Risk: 0    The following portions of the patient's history were reviewed and updated as appropriate:   Past Medical History:  He has a past medical history of Cancer ([place] [address] ), Colon polyp, Hyperlipidemia, PTSD (post-traumatic stress disorder), and Type 2 diabetes mellitus without complication, with long-term current use of insulin ([facility] ) (5/29/2020)  ,  _______________________________________________________________________  Medical Problems:  does not have any pertinent problems on file ,  _______________________________________________________________________  Past Surgical History:   has a past surgical history that includes Appendectomy  ,  _______________________________________________________________________  Family History:  family history includes Diabetes in his mother; Hypertension in his mother; Multiple sclerosis in his sister; Parkinsonism in his sister  ,  _______________________________________________________________________  Social History:   reports that he has never smoked  He has never used smokeless tobacco  He reports previous alcohol use  He reports that he does not use drugs  ,  _______________________________________________________________________  Allergies:  is allergic to lisinopril and metoprolol     _______________________________________________________________________  Current Outpatient Medications   Medication Sig Dispense Refill    gabapentin (Neurontin) 300 mg capsule Take 1 capsule (300 mg total) by mouth 3 (three) times a day 270 capsule 3    amLODIPine (NORVASC) 10 mg tablet Take 1 tablet (10 mg total) by mouth daily 90 tablet 1    amoxicillin-clavulanate (AUGMENTIN) 500-125 mg per tablet TAKE 1 TABLET BY MOUTH TWICE A DAY UNTIL ALL PILLS ARE TAKEN      aspirin 325 mg tablet Take 325 mg by mouth daily      atorvastatin (LIPITOR) 40 mg tablet TAKE 20MG (ONE-HALF TABLET) BY MOUTH EVERY DAY AT 5 PM FOR CHOLESTEROL      Budesonide-Formoterol Fumarate (SYMBICORT IN) Inhale      cholecalciferol (VITAMIN D3) 400 units tablet Take 400 Units by mouth daily      clonazePAM (KlonoPIN) 0 5 mg tablet Take 1 tablet (0 5 mg total) by mouth 2 (two) times a day as needed for anxiety 60 tablet 3    cloNIDine (CATAPRES) 0 1 mg tablet TAKE 1 TABLET BY MOUTH TWICE A  tablet 1    cyclobenzaprine (FLEXERIL) 10 mg tablet Take 1 tablet (10 mg total) by mouth 3 (three) times a day as needed for muscle spasms 90 tablet 1    Diclofenac Sodium (VOLTAREN) 1 % APPLY 2GM TO UPPER EXTREMITIES TOPICALLY TWICE A DAY **USE DOSING CARD** (DO NOT EXCEED 16 GRAMS DAILY TO ANY AFFECTED JOINT OF THE LOWER EXTREMITIES  DO NOT EXCEED 8 GRAMS DAILY TO ANY AFFECTED JOINT OF THE UPPER EXTREMITIES  DO NOT EXCEED A TOTAL DOSE OF 32 GRAMS DAILY OVER ALL JOINTS)      fluticasone-salmeterol (Advair HFA) 230-21 MCG/ACT inhaler Inhale 2 puffs 2 (two) times a day Rinse mouth after use   (Patient not taking: Reported on 4/22/2022 ) 12 g 3    glucose blood test strip 1 each by Other route daily as needed Use as instructed      insulin glargine (LANTUS) 100 units/mL subcutaneous injection Inject 24 Units under the skin daily at bedtime      ipratropium-albuterol (DUO-NEB) 0 5-2 5 mg/3 mL nebulizer solution INHALE 1 VIAL IN NEBULIZER TWICE A DAY      lidocaine (LIDODERM) 5 % APPLY UP TO 3 PATCHES TOPICALLY EVERY DAY FOR PAIN (REMOVE PATCH AFTER 12 HOURS; 12 HOURS ON AND 12 HOURS OFF)      meclizine (ANTIVERT) 12 5 MG tablet Take 1 tablet (12 5 mg total) by mouth 3 (three) times a day as needed for dizziness 30 tablet 2    multivitamin (THERAGRAN) TABS Take 1 tablet by mouth daily  PARoxetine (PAXIL) 30 mg tablet TAKE 1 TABLET BY MOUTH EVERY MORNING 90 tablet 1    spironolactone (ALDACTONE) 25 mg tablet TAKE ONE TABLET BY MOUTH DAILY FOR HEART/BLOOD PRESSURE      traZODone (DESYREL) 50 mg tablet TAKE 1 TABLET BY MOUTH DAILY AT BEDTIME 90 tablet 1     No current facility-administered medications for this visit      _______________________________________________________________________  Review of Systems      Objective:  Vitals:    07/22/22 1427   BP: 120/78   BP Location: Left arm   Patient Position: Sitting   Cuff Size: Large   Pulse: 88   Resp: 16   Temp: (!) 97 3 °F (36 3 °C)   TempSrc: Temporal   SpO2: 97%   Weight: 110 kg (242 lb)   Height: 5' 7" (1 702 m)     Body mass index is 37 9 kg/m²  Physical Exam  Cardiovascular:      Pulses: no weak pulses          Dorsalis pedis pulses are 2+ on the right side and 2+ on the left side  Posterior tibial pulses are 2+ on the right side and 2+ on the left side  Feet:      Right foot:      Skin integrity: Callus and dry skin present  No ulcer, skin breakdown, erythema or warmth  Left foot:      Skin integrity: Callus and dry skin present  No ulcer, skin breakdown, erythema or warmth

## 2022-07-22 NOTE — PATIENT INSTRUCTIONS
Medicare Preventive Visit Patient Instructions  Thank you for completing your Welcome to Medicare Visit or Medicare Annual Wellness Visit today  Your next wellness visit will be due in one year (7/23/2023)  The screening/preventive services that you may require over the next 5-10 years are detailed below  Some tests may not apply to you based off risk factors and/or age  Screening tests ordered at today's visit but not completed yet may show as past due  Also, please note that scanned in results may not display below  Preventive Screenings:  Service Recommendations Previous Testing/Comments   Colorectal Cancer Screening  · Colonoscopy    · Fecal Occult Blood Test (FOBT)/Fecal Immunochemical Test (FIT)  · Fecal DNA/Cologuard Test  · Flexible Sigmoidoscopy Age: 54-65 years old   Colonoscopy: every 10 years (May be performed more frequently if at higher risk)  OR  FOBT/FIT: every 1 year  OR  Cologuard: every 3 years  OR  Sigmoidoscopy: every 5 years  Screening may be recommended earlier than age 48 if at higher risk for colorectal cancer  Also, an individualized decision between you and your healthcare provider will decide whether screening between the ages of 74-80 would be appropriate   Colonoscopy: 01/09/2020  FOBT/FIT: Not on file  Cologuard: Not on file  Sigmoidoscopy: Not on file    Screening Current     Prostate Cancer Screening Individualized decision between patient and health care provider in men between ages of 53-78   Medicare will cover every 12 months beginning on the day after your 50th birthday PSA: No results in last 5 years     History Prostate Cancer     Hepatitis C Screening Once for adults born between 1945 and 1965  More frequently in patients at high risk for Hepatitis C Hep C Antibody: 11/12/2021    Screening Current   Diabetes Screening 1-2 times per year if you're at risk for diabetes or have pre-diabetes Fasting glucose: 111 mg/dL   A1C: 5 6 %    Screening Not Indicated  History Diabetes Cholesterol Screening Once every 5 years if you don't have a lipid disorder  May order more often based on risk factors  Lipid panel: 11/12/2021    Screening Not Indicated  History Lipid Disorder      Other Preventive Screenings Covered by Medicare:  1  Abdominal Aortic Aneurysm (AAA) Screening: covered once if your at risk  You're considered to be at risk if you have a family history of AAA or a male between the age of 73-68 who smoking at least 100 cigarettes in your lifetime  2  Lung Cancer Screening: covers low dose CT scan once per year if you meet all of the following conditions: (1) Age 50-69; (2) No signs or symptoms of lung cancer; (3) Current smoker or have quit smoking within the last 15 years; (4) You have a tobacco smoking history of at least 30 pack years (packs per day x number of years you smoked); (5) You get a written order from a healthcare provider  3  Glaucoma Screening: covered annually if you're considered high risk: (1) You have diabetes OR (2) Family history of glaucoma OR (3)  aged 48 and older OR (3)  American aged 72 and older  3  Osteoporosis Screening: covered every 2 years if you meet one of the following conditions: (1) Have a vertebral abnormality; (2) On glucocorticoid therapy for more than 3 months; (3) Have primary hyperparathyroidism; (4) On osteoporosis medications and need to assess response to drug therapy  5  HIV Screening: covered annually if you're between the age of 12-76  Also covered annually if you are younger than 13 and older than 72 with risk factors for HIV infection  For pregnant patients, it is covered up to 3 times per pregnancy      Immunizations:  Immunization Recommendations   Influenza Vaccine Annual influenza vaccination during flu season is recommended for all persons aged >= 6 months who do not have contraindications   Pneumococcal Vaccine (Prevnar and Pneumovax)  * Prevnar = PCV13  * Pneumovax = PPSV23 Adults 25-60 years old: 1-3 doses may be recommended based on certain risk factors  Adults 72 years old: Prevnar (PCV13) vaccine recommended followed by Pneumovax (PPSV23) vaccine  If already received PPSV23 since turning 65, then PCV13 recommended at least one year after PPSV23 dose  Hepatitis B Vaccine 3 dose series if at intermediate or high risk (ex: diabetes, end stage renal disease, liver disease)   Tetanus (Td) Vaccine - COST NOT COVERED BY MEDICARE PART B Following completion of primary series, a booster dose should be given every 10 years to maintain immunity against tetanus  Td may also be given as tetanus wound prophylaxis  Tdap Vaccine - COST NOT COVERED BY MEDICARE PART B Recommended at least once for all adults  For pregnant patients, recommended with each pregnancy  Shingles Vaccine (Shingrix) - COST NOT COVERED BY MEDICARE PART B  2 shot series recommended in those aged 48 and above     Health Maintenance Due:      Topic Date Due    Colorectal Cancer Screening  01/09/2025    Hepatitis C Screening  Completed     Immunizations Due:      Topic Date Due    Influenza Vaccine (1) 09/01/2022     Advance Directives   What are advance directives? Advance directives are legal documents that state your wishes and plans for medical care  These plans are made ahead of time in case you lose your ability to make decisions for yourself  Advance directives can apply to any medical decision, such as the treatments you want, and if you want to donate organs  What are the types of advance directives? There are many types of advance directives, and each state has rules about how to use them  You may choose a combination of any of the following:  · Living will: This is a written record of the treatment you want  You can also choose which treatments you do not want, which to limit, and which to stop at a certain time  This includes surgery, medicine, IV fluid, and tube feedings     · Durable power of  for healthcare [facility]): This is a written record that states who you want to make healthcare choices for you when you are unable to make them for yourself  This person, called a proxy, is usually a family member or a friend  You may choose more than 1 proxy  · Do not resuscitate (DNR) order:  A DNR order is used in case your heart stops beating or you stop breathing  It is a request not to have certain forms of treatment, such as CPR  A DNR order may be included in other types of advance directives  · Medical directive: This covers the care that you want if you are in a coma, near death, or unable to make decisions for yourself  You can list the treatments you want for each condition  Treatment may include pain medicine, surgery, blood transfusions, dialysis, IV or tube feedings, and a ventilator (breathing machine)  · Values history: This document has questions about your views, beliefs, and how you feel and think about life  This information can help others choose the care that you would choose  Why are advance directives important? An advance directive helps you control your care  Although spoken wishes may be used, it is better to have your wishes written down  Spoken wishes can be misunderstood, or not followed  Treatments may be given even if you do not want them  An advance directive may make it easier for your family to make difficult choices about your care  Fall Prevention    Fall prevention  includes ways to make your home and other areas safer  It also includes ways you can move more carefully to prevent a fall  Health conditions that cause changes in your blood pressure, vision, or muscle strength and coordination may increase your risk for falls  Medicines may also increase your risk for falls if they make you dizzy, weak, or sleepy  Fall prevention tips:   · Stand or sit up slowly  · Use assistive devices as directed  · Wear shoes that fit well and have soles that   · Wear a personal alarm      · Stay active  · Manage your medical conditions  Home Safety Tips:  · Add items to prevent falls in the bathroom  · Keep paths clear  · Install bright lights in your home  · Keep items you use often on shelves within reach  · Paint or place reflective tape on the edges of your stairs  Weight Management   Why it is important to manage your weight:  Being overweight increases your risk of health conditions such as heart disease, high blood pressure, type 2 diabetes, and certain types of cancer  It can also increase your risk for osteoarthritis, sleep apnea, and other respiratory problems  Aim for a slow, steady weight loss  Even a small amount of weight loss can lower your risk of health problems  How to lose weight safely:  A safe and healthy way to lose weight is to eat fewer calories and get regular exercise  You can lose up about 1 pound a week by decreasing the number of calories you eat by 500 calories each day  Healthy meal plan for weight management:  A healthy meal plan includes a variety of foods, contains fewer calories, and helps you stay healthy  A healthy meal plan includes the following:  · Eat whole-grain foods more often  A healthy meal plan should contain fiber  Fiber is the part of grains, fruits, and vegetables that is not broken down by your body  Whole-grain foods are healthy and provide extra fiber in your diet  Some examples of whole-grain foods are whole-wheat breads and pastas, oatmeal, brown rice, and bulgur  · Eat a variety of vegetables every day  Include dark, leafy greens such as spinach, kale, siva greens, and mustard greens  Eat yellow and orange vegetables such as carrots, sweet potatoes, and winter squash  · Eat a variety of fruits every day  Choose fresh or canned fruit (canned in its own juice or light syrup) instead of juice  Fruit juice has very little or no fiber  · Eat low-fat dairy foods  Drink fat-free (skim) milk or 1% milk   Eat fat-free yogurt and low-fat cottage cheese  Try low-fat cheeses such as mozzarella and other reduced-fat cheeses  · Choose meat and other protein foods that are low in fat  Choose beans or other legumes such as split peas or lentils  Choose fish, skinless poultry (chicken or turkey), or lean cuts of red meat (beef or pork)  Before you cook meat or poultry, cut off any visible fat  · Use less fat and oil  Try baking foods instead of frying them  Add less fat, such as margarine, sour cream, regular salad dressing and mayonnaise to foods  Eat fewer high-fat foods  Some examples of high-fat foods include french fries, doughnuts, ice cream, and cakes  · Eat fewer sweets  Limit foods and drinks that are high in sugar  This includes candy, cookies, regular soda, and sweetened drinks  Exercise:  Exercise at least 30 minutes per day on most days of the week  Some examples of exercise include walking, biking, dancing, and swimming  You can also fit in more physical activity by taking the stairs instead of the elevator or parking farther away from stores  Ask your healthcare provider about the best exercise plan for you  © Copyright [employer] 2018 Information is for End User's use only and may not be sold, redistributed or otherwise used for commercial purposes   All illustrations and images included in CareNotes® are the copyrighted property of [employer]  or [address] [place] [address] "[employer]pape

## 2022-07-22 NOTE — ASSESSMENT & PLAN NOTE
Lab Results   Component Value Date    EGFR 32 11/12/2021    CREATININE 2 27 (H) 11/12/2021   Pt to avoid NSAIDs and any IV dyes  Patient to follow up eoither with nephrology or  with us for  further  monitoring of  renal function

## 2022-07-22 NOTE — PROGRESS NOTES
Assessment and Plan:     Problem List Items Addressed This Visit        Endocrine    Type 2 diabetes mellitus without complication, with long-term current use of insulin ([place] [address] ) - Primary     Patient is stable with current meds and discussed a low carb diet  Pt  recommended to see eye doctor and foot doctor for routine screening as per protocol  Recheck A1C  and Cr in 3 months  Patient questions answered today about this condtion  Lab Results   Component Value Date    HGBA1C 5 6 11/12/2021            Relevant Orders    Hemoglobin A1C       Respiratory    Chronic obstructive pulmonary disease, unspecified COPD type ([place] [address] )     Continue with current inhalation therapy to maximize lung function  AVOID ALL TOBACCO USAGE and please obtain flu vaccine  yearly  Cardiovascular and Mediastinum    Essential hypertension     Patient is stable with current anti-hypertensive medicine and continue to follow a low sodium diet and take current medication  All questions about this condition were answered today  Genitourinary    Stage 4 chronic kidney disease ([place] [address] )     Lab Results   Component Value Date    EGFR 32 11/12/2021    CREATININE 2 27 (H) 11/12/2021   Pt to avoid NSAIDs and any IV dyes  Patient to follow up eoither with nephrology or  with us for  further  monitoring of  renal function  Other    Depression     Patient to continue utilizing medical therapy as well and counseling sources as applicable for condition  If  suicidal thought or fear of suicide to contact 911 and seek help immediately  Meds reviewed and patient questions answered today         Hyperlipidemia     Patient  is stable with current medication and we discussed a low fat low cholesterol diet  Weight loss also discussed for this will help lower cholesterol also  Recheck lipids in 6 months           Relevant Orders    Comprehensive metabolic panel    Lipid Panel with Direct LDL reflex    Obesity, morbid ([place] [address] )     Patient to increase exercise and partake of a diet with less calories to promote  weight loss           Other Visit Diagnoses     Well adult exam        Type 2 diabetes mellitus with diabetic neuropathy, without long-term current use of insulin (HCC)        Relevant Medications    gabapentin (Neurontin) 300 mg capsule           Preventive health issues were discussed with patient, and age appropriate screening tests were ordered as noted in patient's After Visit Summary  Personalized health advice and appropriate referrals for health education or preventive services given if needed, as noted in patient's After Visit Summary       History of Present Illness:     Patient presents for a Medicare Wellness Visit    HPI   Patient Care Team:  Ravinder Cao MD as PCP - General (Family Medicine)  Ravinder Cao MD as PCP - [address] [place] [address] (RTE)  Ravinder Cao MD as PCP - [facility] ([facility])     Review of Systems:     Review of Systems     Problem List:     Patient Active Problem List   Diagnosis    Prostate cancer [facility])    Depression    Essential hypertension    Low back pain    Type 2 diabetes mellitus without complication, with long-term current use of insulin ([address] )    Hyperlipidemia    Obesity, morbid ([address] )    Primary hyperaldosteronism ([address] )    Stage 4 chronic kidney disease ([address] )    Bronchospasm    Chronic obstructive pulmonary disease, unspecified COPD type ([address] )    Cardiomyopathy, unspecified type ([address] )      Past Medical and Surgical History:     Past Medical History:   Diagnosis Date    Cancer [facility])     PROSTATE CANCER    Colon polyp     Hyperlipidemia     PTSD (post-traumatic stress disorder)     Type 2 diabetes mellitus without complication, with long-term current use of insulin ([address] ) 5/29/2020     Past Surgical History:   Procedure Laterality Date    APPENDECTOMY        Family History:     Family History   Problem Relation Age of Onset    Diabetes Mother     Hypertension Mother    Philomena Courser Multiple sclerosis Sister     Parkinsonism Sister       Social History:     Social History     Socioeconomic History    Marital status: /Civil Union     Spouse name: None    Number of children: None    Years of education: None    Highest education level: None   Occupational History    None   Tobacco Use    Smoking status: Never Smoker    Smokeless tobacco: Never Used   Vaping Use    Vaping Use: Never used   Substance and Sexual Activity    Alcohol use: Not Currently    Drug use: Never    Sexual activity: None   Other Topics Concern    None   Social History Narrative    Most recent tobacco use screenin-      Do you currently or have you served in the [employer]:   Yes      If Yes, What branch of service:   [address] [place] [address]      Were you activated, into active duty, as a member of the [employer] or as a Reservist:   No      Marital status:         Exercise level:    Moderate      Diet:   Regular      General stress level:   High      Alcohol intake:   None      Caffeine intake:   None      Seat belts used routinely:   Yes      Sunscreen used routinely:   Yes      Smoke alarm in home:   Yes      Advance directive:   Yes      Social Determinants of Health     Financial Resource Strain: Not on file   Food Insecurity: Not on file   Transportation Needs: Not on file   Physical Activity: Not on file   Stress: Not on file   Social Connections: Not on file   Intimate Partner Violence: Not on file   Housing Stability: Not on file      Medications and Allergies:     Current Outpatient Medications   Medication Sig Dispense Refill    gabapentin (Neurontin) 300 mg capsule Take 1 capsule (300 mg total) by mouth 3 (three) times a day 270 capsule 3    amLODIPine (NORVASC) 10 mg tablet Take 1 tablet (10 mg total) by mouth daily 90 tablet 1    amoxicillin-clavulanate (AUGMENTIN) 500-125 mg per tablet TAKE 1 TABLET BY MOUTH TWICE A DAY UNTIL ALL PILLS ARE TAKEN      aspirin 325 mg tablet Take 325 mg by mouth daily      atorvastatin (LIPITOR) 40 mg tablet TAKE 20MG (ONE-HALF TABLET) BY MOUTH EVERY DAY AT 5 PM FOR CHOLESTEROL      Budesonide-Formoterol Fumarate (SYMBICORT IN) Inhale      cholecalciferol (VITAMIN D3) 400 units tablet Take 400 Units by mouth daily      clonazePAM (KlonoPIN) 0 5 mg tablet Take 1 tablet (0 5 mg total) by mouth 2 (two) times a day as needed for anxiety 60 tablet 3    cloNIDine (CATAPRES) 0 1 mg tablet TAKE 1 TABLET BY MOUTH TWICE A  tablet 1    cyclobenzaprine (FLEXERIL) 10 mg tablet Take 1 tablet (10 mg total) by mouth 3 (three) times a day as needed for muscle spasms 90 tablet 1    Diclofenac Sodium (VOLTAREN) 1 % APPLY 2GM TO UPPER EXTREMITIES TOPICALLY TWICE A DAY **USE DOSING CARD** (DO NOT EXCEED 16 GRAMS DAILY TO ANY AFFECTED JOINT OF THE LOWER EXTREMITIES  DO NOT EXCEED 8 GRAMS DAILY TO ANY AFFECTED JOINT OF THE UPPER EXTREMITIES  DO NOT EXCEED A TOTAL DOSE OF 32 GRAMS DAILY OVER ALL JOINTS)      fluticasone-salmeterol (Advair HFA) 230-21 MCG/ACT inhaler Inhale 2 puffs 2 (two) times a day Rinse mouth after use   (Patient not taking: Reported on 4/22/2022 ) 12 g 3    glucose blood test strip 1 each by Other route daily as needed Use as instructed      insulin glargine (LANTUS) 100 units/mL subcutaneous injection Inject 24 Units under the skin daily at bedtime      ipratropium-albuterol (DUO-NEB) 0 5-2 5 mg/3 mL nebulizer solution INHALE 1 VIAL IN NEBULIZER TWICE A DAY      lidocaine (LIDODERM) 5 % APPLY UP TO 3 PATCHES TOPICALLY EVERY DAY FOR PAIN (REMOVE PATCH AFTER 12 HOURS; 12 HOURS ON AND 12 HOURS OFF)      meclizine (ANTIVERT) 12 5 MG tablet Take 1 tablet (12 5 mg total) by mouth 3 (three) times a day as needed for dizziness 30 tablet 2    multivitamin (THERAGRAN) TABS Take 1 tablet by mouth daily      PARoxetine (PAXIL) 30 mg tablet TAKE 1 TABLET BY MOUTH EVERY MORNING 90 tablet 1    spironolactone (ALDACTONE) 25 mg tablet TAKE ONE TABLET BY MOUTH DAILY FOR HEART/BLOOD PRESSURE      traZODone (DESYREL) 50 mg tablet TAKE 1 TABLET BY MOUTH DAILY AT BEDTIME 90 tablet 1     No current facility-administered medications for this visit  Allergies   Allergen Reactions    Lisinopril Anaphylaxis    Metoprolol Bradycardia      Immunizations:     Immunization History   Administered Date(s) Administered    COVID-19 MODERNA VACC 0 5 ML IM 07/02/2021, 07/30/2021, 01/25/2022    COVID-19, unspecified 04/28/2021    DT (pediatric) 05/12/1998    INFLUENZA 11/06/2020, 11/01/2021    Influenza, high dose seasonal 0 7 mL 11/16/2020, 11/12/2021    Pneumococcal 06/23/2009    Pneumococcal Conjugate 13-Valent 03/19/2018    Pneumococcal Polysaccharide PPV23 11/02/2018    Tdap 11/02/2018    Tetanus Toxoid, Unspecified 09/01/2007      Health Maintenance:         Topic Date Due    Colorectal Cancer Screening  01/09/2025    Hepatitis C Screening  Completed         Topic Date Due    Influenza Vaccine (1) 09/01/2022      Medicare Screening Tests and Risk Assessments:     Adam is here for his Subsequent Wellness visit  Health Risk Assessment:   Patient rates overall health as good  Patient feels that their physical health rating is slightly worse  Patient is very satisfied with their life  Eyesight was rated as slightly worse  Hearing was rated as slightly worse  Patient feels that their emotional and mental health rating is same  Patients states they are sometimes angry  Patient states they are sometimes unusually tired/fatigued  Pain experienced in the last 7 days has been a lot  Patient's pain rating has been 8/10  Patient states that he has experienced no weight loss or gain in last 6 months  Depression Screening:   PHQ-9 Score: 1      Fall Risk Screening:    In the past year, patient has experienced: history of falling in past year    Number of falls: 2 or more  Injured during fall?: Yes    Feels unsteady when standing or walking?: Yes    Worried about falling?: Yes Home Safety:  Patient has trouble with stairs inside or outside of their home  Patient has working smoke alarms and has working carbon monoxide detector  Home safety hazards include: none  Nutrition:   Current diet is Regular  Medications:   Patient is currently taking over-the-counter supplements  OTC medications include: see medication list  Patient is not able to manage medications  Activities of Daily Living (ADLs)/Instrumental Activities of Daily Living (IADLs):   Walk and transfer into and out of bed and chair?: Yes  Dress and groom yourself?: Yes    Bathe or shower yourself?: Yes    Feed yourself? Yes  Do your laundry/housekeeping?: Yes  Manage your money, pay your bills and track your expenses?: Yes  Make your own meals?: Yes    Do your own shopping?: Yes    Previous Hospitalizations:   Any hospitalizations or ED visits within the last 12 months?: Yes    How many hospitalizations have you had in the last year?: 1-2    Advance Care Planning:   Living will: Yes    Durable POA for healthcare:  Yes    Advanced directive: Yes    Advanced directive counseling given: No    Five wishes given: No    Patient declined ACP directive: No    End of Life Decisions reviewed with patient: Yes    Provider agrees with end of life decisions: Yes      Cognitive Screening:   Provider or family/friend/caregiver concerned regarding cognition?: No    PREVENTIVE SCREENINGS      Cardiovascular Screening:    General: Screening Not Indicated and History Lipid Disorder      Diabetes Screening:     General: Screening Not Indicated and History Diabetes      Colorectal Cancer Screening:     General: Screening Current      Prostate Cancer Screening:    General: History Prostate Cancer      Abdominal Aortic Aneurysm (AAA) Screening:    Risk factors include: age between 73-69 yo        Lung Cancer Screening:     General: Screening Not Indicated      Hepatitis C Screening:    General: Screening Current    Screening, Brief Intervention, and Referral to Treatment (SBIRT)    Screening  Typical number of drinks in a day: 0  Typical number of drinks in a week: 0  Interpretation: Low risk drinking behavior      Single Item Drug Screening:  How often have you used an illegal drug (including marijuana) or a prescription medication for non-medical reasons in the past year? never    Single Item Drug Screen Score: 0  Interpretation: Negative screen for possible drug use disorder    No exam data present     Physical Exam:     /78 (BP Location: Left arm, Patient Position: Sitting, Cuff Size: Large)   Pulse 88   Temp (!) 97 3 °F (36 3 °C) (Temporal)   Resp 16   Ht 5' 7" (1 702 m)   Wt 110 kg (242 lb)   SpO2 97%   BMI 37 90 kg/m²     Physical Exam     Evelin Cervantes MD

## 2022-07-23 PROCEDURE — 3060F POS MICROALBUMINURIA REV: CPT | Performed by: FAMILY MEDICINE

## 2022-08-03 LAB
CREAT UR-MCNC: 54.9 MG/DL
MICROALBUMIN UR-MCNC: 44.5 MG/L (ref 0–20)
MICROALBUMIN/CREAT 24H UR: 81 MG/G CREATININE (ref 0–30)

## 2022-08-05 ENCOUNTER — APPOINTMENT (OUTPATIENT)
Dept: LAB | Facility: HOSPITAL | Age: 77
End: 2022-08-05
Payer: COMMERCIAL

## 2022-08-05 DIAGNOSIS — E11.9 TYPE 2 DIABETES MELLITUS WITHOUT COMPLICATION, WITH LONG-TERM CURRENT USE OF INSULIN (HCC): ICD-10-CM

## 2022-08-05 DIAGNOSIS — E78.2 MIXED HYPERLIPIDEMIA: ICD-10-CM

## 2022-08-05 DIAGNOSIS — Z79.4 TYPE 2 DIABETES MELLITUS WITHOUT COMPLICATION, WITH LONG-TERM CURRENT USE OF INSULIN (HCC): ICD-10-CM

## 2022-08-05 LAB
ALBUMIN SERPL BCP-MCNC: 4 G/DL (ref 3.5–5)
ALP SERPL-CCNC: 75 U/L (ref 46–116)
ALT SERPL W P-5'-P-CCNC: 26 U/L (ref 12–78)
ANION GAP SERPL CALCULATED.3IONS-SCNC: 10 MMOL/L (ref 4–13)
AST SERPL W P-5'-P-CCNC: 17 U/L (ref 5–45)
BILIRUB SERPL-MCNC: 0.55 MG/DL (ref 0.2–1)
BUN SERPL-MCNC: 31 MG/DL (ref 5–25)
CALCIUM SERPL-MCNC: 9.9 MG/DL (ref 8.3–10.1)
CHLORIDE SERPL-SCNC: 103 MMOL/L (ref 96–108)
CHOLEST SERPL-MCNC: 118 MG/DL
CO2 SERPL-SCNC: 27 MMOL/L (ref 21–32)
CREAT SERPL-MCNC: 2.57 MG/DL (ref 0.6–1.3)
EST. AVERAGE GLUCOSE BLD GHB EST-MCNC: 137 MG/DL
GFR SERPL CREATININE-BSD FRML MDRD: 23 ML/MIN/1.73SQ M
GLUCOSE P FAST SERPL-MCNC: 123 MG/DL (ref 65–99)
HBA1C MFR BLD: 6.4 %
HDLC SERPL-MCNC: 50 MG/DL
LDLC SERPL CALC-MCNC: 47 MG/DL (ref 0–100)
POTASSIUM SERPL-SCNC: 4.7 MMOL/L (ref 3.5–5.3)
PROT SERPL-MCNC: 7.7 G/DL (ref 6.4–8.4)
SODIUM SERPL-SCNC: 140 MMOL/L (ref 135–147)
TRIGL SERPL-MCNC: 104 MG/DL

## 2022-08-05 PROCEDURE — 83036 HEMOGLOBIN GLYCOSYLATED A1C: CPT

## 2022-08-05 PROCEDURE — 80061 LIPID PANEL: CPT

## 2022-08-05 PROCEDURE — 80053 COMPREHEN METABOLIC PANEL: CPT

## 2022-08-05 PROCEDURE — 3044F HG A1C LEVEL LT 7.0%: CPT | Performed by: FAMILY MEDICINE

## 2022-08-05 PROCEDURE — 36415 COLL VENOUS BLD VENIPUNCTURE: CPT

## 2022-10-15 DIAGNOSIS — I10 ESSENTIAL HYPERTENSION: ICD-10-CM

## 2022-10-17 RX ORDER — AMLODIPINE BESYLATE 10 MG/1
TABLET ORAL
Qty: 90 TABLET | Refills: 1 | Status: SHIPPED | OUTPATIENT
Start: 2022-10-17

## 2022-10-19 DIAGNOSIS — F41.9 ANXIETY: ICD-10-CM

## 2022-10-19 RX ORDER — CLONAZEPAM 0.5 MG/1
0.5 TABLET ORAL 2 TIMES DAILY PRN
Qty: 60 TABLET | Refills: 3 | Status: SHIPPED | OUTPATIENT
Start: 2022-10-19 | End: 2022-10-24

## 2022-10-24 ENCOUNTER — OFFICE VISIT (OUTPATIENT)
Dept: FAMILY MEDICINE CLINIC | Facility: CLINIC | Age: 77
End: 2022-10-24
Payer: COMMERCIAL

## 2022-10-24 VITALS
HEART RATE: 86 BPM | TEMPERATURE: 97.6 F | OXYGEN SATURATION: 96 % | BODY MASS INDEX: 38.14 KG/M2 | RESPIRATION RATE: 18 BRPM | DIASTOLIC BLOOD PRESSURE: 76 MMHG | SYSTOLIC BLOOD PRESSURE: 130 MMHG | WEIGHT: 243 LBS | HEIGHT: 67 IN

## 2022-10-24 DIAGNOSIS — E78.2 MIXED HYPERLIPIDEMIA: ICD-10-CM

## 2022-10-24 DIAGNOSIS — Z79.4 TYPE 2 DIABETES MELLITUS WITHOUT COMPLICATION, WITH LONG-TERM CURRENT USE OF INSULIN (HCC): Primary | ICD-10-CM

## 2022-10-24 DIAGNOSIS — F41.9 ANXIETY: ICD-10-CM

## 2022-10-24 DIAGNOSIS — C61 PROSTATE CANCER (HCC): ICD-10-CM

## 2022-10-24 DIAGNOSIS — Z23 IMMUNIZATION DUE: ICD-10-CM

## 2022-10-24 DIAGNOSIS — E11.9 TYPE 2 DIABETES MELLITUS WITHOUT COMPLICATION, WITH LONG-TERM CURRENT USE OF INSULIN (HCC): Primary | ICD-10-CM

## 2022-10-24 DIAGNOSIS — J44.9 CHRONIC OBSTRUCTIVE PULMONARY DISEASE, UNSPECIFIED COPD TYPE (HCC): ICD-10-CM

## 2022-10-24 DIAGNOSIS — I10 ESSENTIAL HYPERTENSION: ICD-10-CM

## 2022-10-24 DIAGNOSIS — F33.40 RECURRENT MAJOR DEPRESSIVE DISORDER, IN REMISSION (HCC): ICD-10-CM

## 2022-10-24 DIAGNOSIS — E66.01 OBESITY, MORBID (HCC): ICD-10-CM

## 2022-10-24 PROCEDURE — 99214 OFFICE O/P EST MOD 30 MIN: CPT | Performed by: FAMILY MEDICINE

## 2022-10-24 PROCEDURE — 90662 IIV NO PRSV INCREASED AG IM: CPT | Performed by: FAMILY MEDICINE

## 2022-10-24 PROCEDURE — G0008 ADMIN INFLUENZA VIRUS VAC: HCPCS | Performed by: FAMILY MEDICINE

## 2022-10-24 RX ORDER — CLONAZEPAM 0.5 MG/1
0.5 TABLET, ORALLY DISINTEGRATING ORAL 2 TIMES DAILY
Qty: 30 TABLET | Refills: 0
Start: 2022-10-24

## 2022-10-24 NOTE — ASSESSMENT & PLAN NOTE
Patient is stable with current meds and discussed a low carb diet  Pt  recommended to see eye doctor and foot doctor for routine screening as per protocol  Recheck A1C  and Cr in 3 months  Patient questions answered today about this condtion    Lab Results   Component Value Date    HGBA1C 6 4 (H) 08/05/2022

## 2022-10-24 NOTE — PROGRESS NOTES
Name: Kamron Jimenez      : [id number]      MRN: [id number]  Encounter Provider: Becca Baez MD  Encounter Date: 10/24/2022   Encounter department: [address] [place] [address] Place     1  Type 2 diabetes mellitus without complication, with long-term current use of insulin ([facility] )  Assessment & Plan:  Patient is stable with current meds and discussed a low carb diet  Pt  recommended to see eye doctor and foot doctor for routine screening as per protocol  Recheck A1C  and Cr in 3 months  Patient questions answered today about this condtion  Lab Results   Component Value Date    HGBA1C 6 4 (H) 2022       Orders:  -     Ambulatory Referral to Podiatry; Future    2  Chronic obstructive pulmonary disease, unspecified COPD type ([place] [address] )  Assessment & Plan:  Continue with current inhalation therapy to maximize lung function  AVOID ALL TOBACCO USAGE and please obtain flu vaccine  yearly  3  Essential hypertension  Assessment & Plan:  Patient is stable with current anti-hypertensive medicine and continue to follow a low sodium diet and take current medication  All questions about this condition were answered today  4  Prostate cancer [facility])  Assessment & Plan:  Patient is stable  and will continue present plan of care and reassess at next routine visit  All questions about this problem from patient were answered today  5  Recurrent major depressive disorder, in remission [facility])  Assessment & Plan:  Patient to continue utilizing medical therapy as well and counseling sources as applicable for condition  If  suicidal thought or fear of suicide to contact 911 and seek help immediately  Meds reviewed and patient questions answered today      6  Mixed hyperlipidemia  Assessment & Plan:  Patient  is stable with current medication and we discussed a low fat low cholesterol diet  Weight loss also discussed for this will help lower cholesterol also   Recheck lipids in 6 months  7  Obesity, morbid ([facility] )  Assessment & Plan:  Patient to increase exercise and partake of a diet with less calories to promote  weight loss      8  Anxiety  -     clonazePAM (KlonoPIN) 0 5 MG disintegrating tablet; Take 1 tablet (0 5 mg total) by mouth 2 (two) times a day        Depression Screening and Follow-up Plan: Patient assessed for underlying major depression  Brief counseling provided and recommend additional follow-up/re-evaluation next office visit  Patient advised to follow-up with PCP for further management  Subjective     This 51-year-old male here today for checkup on multiple medical problems her patient with type 2 diabetes prostate cancer which is in currently an inmate in remission  Hypertension depression anxiety and COPD  Patient is doing well with his medication although he is really not using a lot of his DuoNeb he has been having some wheezing discussed with patient about the use of that medicine every 6 hours as needed by his nebulizer which he can help him a lot with his breathing he is taking his Symbicort  Patient also is getting some care from the [facility] and is doing well with that  Patient also has been having more problems with ambulating he has decreased stamina and does have problems with staying on his feet after going about a good 100 yd or so  Patient was enquiring about using a motorized scooter for when he goes long distances because his and have the stamina to do that  I told him I would order that but he will use exercise so he does not become debilitated by using that  Patient is able to use a scooter he has enough mental capacity to know how to use it and use it within his home  He was to maintain ADLs when he goes long distances  [facility] without any [facility] through us today      Review of Systems   Constitutional: Negative for activity change, appetite change, chills, fatigue, fever and unexpected weight change  HENT: Negative for congestion, ear pain, hearing loss, mouth sores, postnasal drip, sinus pressure, sinus pain, sneezing and sore throat  Respiratory: Negative for apnea, cough, shortness of breath and wheezing  Cardiovascular: Negative for chest pain, palpitations and leg swelling  Gastrointestinal: Negative for abdominal pain, constipation, diarrhea, nausea and vomiting  Endocrine: Negative for cold intolerance and heat intolerance  Genitourinary: Negative for dysuria, frequency and hematuria  Musculoskeletal: Negative for arthralgias, back pain, gait problem, joint swelling and neck pain  Skin: Negative for rash  Neurological: Negative for dizziness, weakness and numbness  Hematological: Does not bruise/bleed easily  Psychiatric/Behavioral: Negative for agitation, behavioral problems, confusion, hallucinations and sleep disturbance  The patient is not nervous/anxious          Past Medical History:   Diagnosis Date   • Cancer [facility])     PROSTATE CANCER   • Colon polyp    • Hyperlipidemia    • PTSD (post-traumatic stress disorder)    • Type 2 diabetes mellitus without complication, with long-term current use of insulin ([facility] ) 5/29/2020     Past Surgical History:   Procedure Laterality Date   • APPENDECTOMY       Family History   Problem Relation Age of Onset   • Diabetes Mother    • Hypertension Mother    • Multiple sclerosis Sister    • Parkinsonism Sister      Social History     Socioeconomic History   • Marital status: /Civil Union     Spouse name: None   • Number of children: None   • Years of education: None   • Highest education level: None   Occupational History   • None   Tobacco Use   • Smoking status: Never Smoker   • Smokeless tobacco: Never Used   Vaping Use   • Vaping Use: Never used   Substance and Sexual Activity   • Alcohol use: Not Currently   • Drug use: Never   • Sexual activity: None   Other Topics Concern   • None   Social History Narrative    Most recent tobacco use screenin-      Do you currently or have you served in the [employer]:   Yes      If Yes, What branch of service:   [employer]      Were you activated, into active duty, as a member of the [employer] or as a Reservist:   No      Marital status:         Exercise level: Moderate      Diet:   Regular      General stress level:   High      Alcohol intake:   None      Caffeine intake:   None      Seat belts used routinely:   Yes      Sunscreen used routinely:   Yes      Smoke alarm in home:   Yes      Advance directive:   Yes      Social Determinants of Health     Financial Resource Strain: Not on file   Food Insecurity: Not on file   Transportation Needs: Not on file   Physical Activity: Not on file   Stress: Not on file   Social Connections: Not on file   Intimate Partner Violence: Not on file   Housing Stability: Not on file     Current Outpatient Medications on File Prior to Visit   Medication Sig   • amLODIPine (NORVASC) 10 mg tablet TAKE 1 TABLET BY MOUTH EVERY DAY   • amoxicillin-clavulanate (AUGMENTIN) 500-125 mg per tablet TAKE 1 TABLET BY MOUTH TWICE A DAY UNTIL ALL PILLS ARE TAKEN   • aspirin 325 mg tablet Take 325 mg by mouth daily   • atorvastatin (LIPITOR) 40 mg tablet TAKE 20MG (ONE-HALF TABLET) BY MOUTH EVERY DAY AT 5 PM FOR CHOLESTEROL   • Budesonide-Formoterol Fumarate (SYMBICORT IN) Inhale   • cholecalciferol (VITAMIN D3) 400 units tablet Take 400 Units by mouth daily   • cloNIDine (CATAPRES) 0 1 mg tablet TAKE 1 TABLET BY MOUTH TWICE A DAY   • cyclobenzaprine (FLEXERIL) 10 mg tablet Take 1 tablet (10 mg total) by mouth 3 (three) times a day as needed for muscle spasms   • Diclofenac Sodium (VOLTAREN) 1 % APPLY 2GM TO UPPER EXTREMITIES TOPICALLY TWICE A DAY **USE DOSING CARD** (DO NOT EXCEED 16 GRAMS DAILY TO ANY AFFECTED JOINT OF THE LOWER EXTREMITIES  DO NOT EXCEED 8 GRAMS DAILY TO ANY AFFECTED JOINT OF THE UPPER EXTREMITIES   DO NOT EXCEED A TOTAL DOSE OF 32 GRAMS DAILY OVER ALL JOINTS)   • glucose blood test strip 1 each by Other route daily as needed Use as instructed   • insulin glargine (LANTUS) 100 units/mL subcutaneous injection Inject 24 Units under the skin daily at bedtime   • ipratropium-albuterol (DUO-NEB) 0 5-2 5 mg/3 mL nebulizer solution INHALE 1 VIAL IN NEBULIZER TWICE A DAY   • lidocaine (LIDODERM) 5 % APPLY UP TO 3 PATCHES TOPICALLY EVERY DAY FOR PAIN (REMOVE PATCH AFTER 12 HOURS; 12 HOURS ON AND 12 HOURS OFF)   • meclizine (ANTIVERT) 12 5 MG tablet Take 1 tablet (12 5 mg total) by mouth 3 (three) times a day as needed for dizziness   • multivitamin (THERAGRAN) TABS Take 1 tablet by mouth daily   • PARoxetine (PAXIL) 30 mg tablet TAKE 1 TABLET BY MOUTH EVERY MORNING   • spironolactone (ALDACTONE) 25 mg tablet TAKE ONE TABLET BY MOUTH DAILY FOR HEART/BLOOD PRESSURE   • traZODone (DESYREL) 50 mg tablet TAKE 1 TABLET BY MOUTH DAILY AT BEDTIME   • [DISCONTINUED] clonazePAM (KlonoPIN) 0 5 mg tablet Take 1 tablet (0 5 mg total) by mouth 2 (two) times a day as needed for anxiety   • gabapentin (Neurontin) 300 mg capsule Take 1 capsule (300 mg total) by mouth 3 (three) times a day   • [DISCONTINUED] fluticasone-salmeterol (Advair HFA) 230-21 MCG/ACT inhaler Inhale 2 puffs 2 (two) times a day Rinse mouth after use   (Patient not taking: Reported on 4/22/2022 )     Allergies   Allergen Reactions   • Lisinopril Anaphylaxis   • Metoprolol Bradycardia     Immunization History   Administered Date(s) Administered   • COVID-19 MODERNA VACC 0 5 ML IM 07/02/2021, 07/30/2021, 01/25/2022   • COVID-19, unspecified 04/28/2021   • DT (pediatric) 05/12/1998   • INFLUENZA 11/06/2020, 11/01/2021   • Influenza, high dose seasonal 0 7 mL 11/16/2020, 11/12/2021   • Pneumococcal 06/23/2009   • Pneumococcal Conjugate 13-Valent 03/19/2018   • Pneumococcal Polysaccharide PPV23 11/02/2018   • Tdap 11/02/2018   • Tetanus Toxoid, Unspecified 09/01/2007       Objective     /76 (BP Location: Left arm, Patient Position: Sitting, Cuff Size: Large)   Pulse 86   Temp 97 6 °F (36 4 °C)   Resp 18   Ht 5' 7" (1 702 m)   Wt 110 kg (243 lb)   SpO2 96%   BMI 38 06 kg/m²     Physical Exam  Vitals and nursing note reviewed  Constitutional:       Appearance: He is well-developed  HENT:      Head: Normocephalic and atraumatic  Nose: Nose normal    Eyes:      General: No scleral icterus  Conjunctiva/sclera: Conjunctivae normal       Pupils: Pupils are equal, round, and reactive to light  Neck:      Thyroid: No thyromegaly  Cardiovascular:      Rate and Rhythm: Normal rate and regular rhythm  Heart sounds: Normal heart sounds  Pulmonary:      Effort: Pulmonary effort is normal  No respiratory distress  Breath sounds: Normal breath sounds  No wheezing  Abdominal:      General: Bowel sounds are normal       Palpations: Abdomen is soft  Tenderness: There is no abdominal tenderness  There is no guarding or rebound  Musculoskeletal:         General: Normal range of motion  Cervical back: Normal range of motion and neck supple  Skin:     General: Skin is warm and dry  Findings: No rash  Neurological:      Mental Status: He is alert and oriented to person, place, and time  Psychiatric:         Mood and Affect: Mood normal          Behavior: Behavior normal          Thought Content:  Thought content normal          Judgment: Judgment normal        Memory MD Radha

## 2022-11-18 ENCOUNTER — TELEPHONE (OUTPATIENT)
Dept: FAMILY MEDICINE CLINIC | Facility: CLINIC | Age: 77
End: 2022-11-18

## 2022-11-18 NOTE — TELEPHONE ENCOUNTER
Gael Mcgarry called, he said he was short of breath and not feeling better  I advised him to go to ED, and I could call nila or his wife was with him and she could take him  Nasreen Yang He declined  He requests an order for a Chest X-Ray

## 2022-11-30 ENCOUNTER — TELEPHONE (OUTPATIENT)
Dept: FAMILY MEDICINE CLINIC | Facility: CLINIC | Age: 77
End: 2022-11-30

## 2022-11-30 DIAGNOSIS — J90 PLEURAL EFFUSION ON LEFT: Primary | ICD-10-CM

## 2022-11-30 NOTE — TELEPHONE ENCOUNTER
Pt states he has been going to the [place] and was told he has water on his L lung and was going for a PET scan  Pt  states he does not want to go to the [place] for this problem and would like to see someone at [facility] about it  I will refer him to a pulmonologist to take over for the [place] care at his request  Pt also told he had   some BLEBS on his scan also

## 2022-11-30 NOTE — TELEPHONE ENCOUNTER
Patient requested a call from Dr Halima Abdi about fluid around lesions on his throat   Please call patient at  [phone]

## 2022-12-06 ENCOUNTER — TELEPHONE (OUTPATIENT)
Dept: CARDIOLOGY CLINIC | Facility: CLINIC | Age: 77
End: 2022-12-06

## 2022-12-14 NOTE — ASSESSMENT & PLAN NOTE
[facility] Medical form signed and faxed back 12/14/2022 Patient is stable with current anti-hypertensive medicine and continue to follow a low sodium diet and take current medication None

## 2022-12-15 ENCOUNTER — TELEPHONE (OUTPATIENT)
Dept: CARDIOLOGY CLINIC | Facility: CLINIC | Age: 77
End: 2022-12-15

## 2022-12-28 ENCOUNTER — TELEPHONE (OUTPATIENT)
Dept: FAMILY MEDICINE CLINIC | Facility: CLINIC | Age: 77
End: 2022-12-28

## 2022-12-28 NOTE — TELEPHONE ENCOUNTER
He will need an OV to document need for a foldable scooter  I can write a script but he will not get it covered without an OV to show need for it

## 2022-12-28 NOTE — TELEPHONE ENCOUNTER
[facility] called they are looking for a DME for a automatic foldable scooter can be faxed to [phone]

## 2023-01-09 DIAGNOSIS — U07.1 COVID: Primary | ICD-10-CM

## 2023-01-09 RX ORDER — MOLNUPIRAVIR 200 MG/1
800 CAPSULE ORAL EVERY 12 HOURS
Qty: 40 CAPSULE | Refills: 0 | Status: SHIPPED | OUTPATIENT
Start: 2023-01-09 | End: 2023-01-14

## 2023-01-09 NOTE — TELEPHONE ENCOUNTER
Pt's wife called and asked for the antiviral sent to the pharmacy for him  It was sent in for her on 1/5/23 and wants it for him as well

## 2023-01-14 DIAGNOSIS — F41.9 ANXIETY: ICD-10-CM

## 2023-01-14 RX ORDER — TRAZODONE HYDROCHLORIDE 50 MG/1
TABLET ORAL
Qty: 90 TABLET | Refills: 1 | Status: SHIPPED | OUTPATIENT
Start: 2023-01-14

## 2023-03-15 ENCOUNTER — OFFICE VISIT (OUTPATIENT)
Dept: FAMILY MEDICINE CLINIC | Facility: CLINIC | Age: 78
End: 2023-03-15

## 2023-03-15 VITALS
SYSTOLIC BLOOD PRESSURE: 136 MMHG | DIASTOLIC BLOOD PRESSURE: 78 MMHG | HEIGHT: 67 IN | OXYGEN SATURATION: 96 % | BODY MASS INDEX: 37.98 KG/M2 | HEART RATE: 97 BPM | WEIGHT: 242 LBS | TEMPERATURE: 97.2 F

## 2023-03-15 DIAGNOSIS — E66.01 OBESITY, MORBID (HCC): ICD-10-CM

## 2023-03-15 DIAGNOSIS — C34.32 MALIGNANT NEOPLASM OF LOWER LOBE OF LEFT LUNG (HCC): ICD-10-CM

## 2023-03-15 DIAGNOSIS — E11.40 TYPE 2 DIABETES MELLITUS WITH DIABETIC NEUROPATHY, WITHOUT LONG-TERM CURRENT USE OF INSULIN (HCC): ICD-10-CM

## 2023-03-15 DIAGNOSIS — E26.09 PRIMARY HYPERALDOSTERONISM (HCC): ICD-10-CM

## 2023-03-15 DIAGNOSIS — N18.4 CHRONIC KIDNEY DISEASE, STAGE 4 (SEVERE) (HCC): ICD-10-CM

## 2023-03-15 DIAGNOSIS — E78.2 MIXED HYPERLIPIDEMIA: ICD-10-CM

## 2023-03-15 DIAGNOSIS — J41.1 MUCOPURULENT CHRONIC BRONCHITIS (HCC): Primary | ICD-10-CM

## 2023-03-15 DIAGNOSIS — J44.9 CHRONIC OBSTRUCTIVE PULMONARY DISEASE, UNSPECIFIED COPD TYPE (HCC): ICD-10-CM

## 2023-03-15 DIAGNOSIS — F33.40 RECURRENT MAJOR DEPRESSIVE DISORDER, IN REMISSION (HCC): ICD-10-CM

## 2023-03-15 DIAGNOSIS — I10 ESSENTIAL HYPERTENSION: ICD-10-CM

## 2023-03-15 DIAGNOSIS — I42.9 CARDIOMYOPATHY, UNSPECIFIED TYPE (HCC): ICD-10-CM

## 2023-03-15 RX ORDER — DEXTRAN 70/HYPROMELLOSE
DROPS OPHTHALMIC (EYE)
COMMUNITY
Start: 2022-10-19

## 2023-03-15 RX ORDER — CIPROFLOXACIN 500 MG/1
500 TABLET, FILM COATED ORAL EVERY 12 HOURS SCHEDULED
Qty: 20 TABLET | Refills: 0 | Status: SHIPPED | OUTPATIENT
Start: 2023-03-15 | End: 2023-03-25

## 2023-03-15 RX ORDER — BENZONATATE 200 MG/1
200 CAPSULE ORAL 3 TIMES DAILY PRN
Qty: 20 CAPSULE | Refills: 0 | Status: SHIPPED | OUTPATIENT
Start: 2023-03-15

## 2023-03-15 RX ORDER — KETOTIFEN FUMARATE 0.35 MG/ML
SOLUTION/ DROPS OPHTHALMIC
COMMUNITY
Start: 2022-10-19

## 2023-03-15 RX ORDER — CARBOXYMETHYLCELLULOSE SODIUM 5 MG/ML
SOLUTION/ DROPS OPHTHALMIC
COMMUNITY
Start: 2022-10-19

## 2023-03-15 NOTE — PROGRESS NOTES
Name: Valorie Gunter      :       MRN: [id number]  Encounter Provider: Seth Gomez MD  Encounter Date: 3/15/2023   Encounter department: [address] [place] [address] Place     1  Mucopurulent chronic bronchitis (HCC)  -     ciprofloxacin (CIPRO) 500 mg tablet; Take 1 tablet (500 mg total) by mouth every 12 (twelve) hours for 10 days  -     benzonatate (TESSALON) 200 MG capsule; Take 1 capsule (200 mg total) by mouth 3 (three) times a day as needed for cough    2  Mixed hyperlipidemia  Assessment & Plan:  Patient  is stable with current medication and we discussed a low fat low cholesterol diet  Weight loss also discussed for this will help lower cholesterol also  Recheck lipids in 6 months  3  Essential hypertension  Assessment & Plan:  Patient is stable with current anti-hypertensive medicine and continue to follow a low sodium diet and take current medication  All questions about this condition were answered today  4  Chronic obstructive pulmonary disease, unspecified COPD type ([facility] )  Assessment & Plan:  Continue with current inhalation therapy to maximize lung function  AVOID ALL TOBACCO USAGE and please obtain flu vaccine  yearly  5  Malignant neoplasm of lower lobe of left lung ([facility] )    6  Recurrent major depressive disorder, in remission ([facility] )    7  Cardiomyopathy, unspecified type ([facility] )    8  Chronic kidney disease, stage 4 (severe) ([facility])    9  Obesity, morbid ([facility] )    10  Primary hyperaldosteronism ([facility] )    11  Type 2 diabetes mellitus with diabetic neuropathy, without long-term current use of insulin ([facility])         Subjective     HPI  Review of Systems   Constitutional: Positive for chills, diaphoresis and fatigue  Negative for activity change, appetite change, fever and unexpected weight change  HENT: Positive for congestion, postnasal drip, sinus pressure, sinus pain, sneezing and sore throat  Negative for ear pain, hearing loss and mouth sores  Respiratory: Positive for cough  Negative for apnea, shortness of breath and wheezing  Cardiovascular: Negative for chest pain, palpitations and leg swelling  Gastrointestinal: Negative for abdominal pain, constipation, diarrhea, nausea and vomiting  Endocrine: Negative for cold intolerance and heat intolerance  Genitourinary: Negative for dysuria, frequency and hematuria  Musculoskeletal: Positive for back pain  Negative for arthralgias, gait problem, joint swelling and neck pain  Skin: Negative for rash  Neurological: Positive for weakness  Negative for dizziness and numbness  Hematological: Does not bruise/bleed easily  Psychiatric/Behavioral: Negative for agitation, behavioral problems, confusion, hallucinations and sleep disturbance  The patient is not nervous/anxious          Past Medical History:   Diagnosis Date   • Cancer [facility])     PROSTATE CANCER   • Colon polyp    • Hyperlipidemia    • PTSD (post-traumatic stress disorder)    • Type 2 diabetes mellitus without complication, with long-term current use of insulin ([facility] ) 5/29/2020   • Type 2 diabetes mellitus without complication, with long-term current use of insulin ([facility] ) 5/29/2020     Past Surgical History:   Procedure Laterality Date   • APPENDECTOMY       Family History   Problem Relation Age of Onset   • Diabetes Mother    • Hypertension Mother    • Multiple sclerosis Sister    • Parkinsonism Sister      Social History     Socioeconomic History   • Marital status: /Civil Union     Spouse name: None   • Number of children: None   • Years of education: None   • Highest education level: None   Occupational History   • None   Tobacco Use   • Smoking status: Never   • Smokeless tobacco: Never   Vaping Use   • Vaping Use: Never used   Substance and Sexual Activity   • Alcohol use: Not Currently   • Drug use: Never   • Sexual activity: None   Other Topics Concern   • None   Social History Narrative    Most recent tobacco use screenin-      Do you currently or have you served in the [employer]:   Yes      If Yes, What branch of service:   [employer]      Were you activated, into active duty, as a member of the [employer] or as a Reservist:   No      Marital status:         Exercise level:    Moderate      Diet:   Regular      General stress level:   High      Alcohol intake:   None      Caffeine intake:   None      Seat belts used routinely:   Yes      Sunscreen used routinely:   Yes      Smoke alarm in home:   Yes      Advance directive:   Yes      Social Determinants of Health     Financial Resource Strain: Not on file   Food Insecurity: Not on file   Transportation Needs: Not on file   Physical Activity: Not on file   Stress: Not on file   Social Connections: Not on file   Intimate Partner Violence: Not on file   Housing Stability: Not on file     Current Outpatient Medications on File Prior to Visit   Medication Sig   • amLODIPine (NORVASC) 10 mg tablet TAKE 1 TABLET BY MOUTH EVERY DAY   • Artificial Tear Solution (Just Tears Eye Drops) SOLN INSTILL 1 DROP BOTH EYES TWICE A DAY OR AS NEEDED FOR DRY EYE   • aspirin 325 mg tablet Take 325 mg by mouth daily   • atorvastatin (LIPITOR) 40 mg tablet TAKE 20MG (ONE-HALF TABLET) BY MOUTH EVERY DAY AT 5 PM FOR CHOLESTEROL   • Budesonide-Formoterol Fumarate (SYMBICORT IN) Inhale   • carboxymethylcellulose (REFRESH PLUS) 0 5 % SOLN INSTILL 1 DROP BOTH EYES THREE TIMES A DAY AS NEEDED FOR DRY EYES   • cholecalciferol (VITAMIN D3) 400 units tablet Take 400 Units by mouth daily   • clonazePAM (KlonoPIN) 0 5 MG disintegrating tablet Take 1 tablet (0 5 mg total) by mouth 2 (two) times a day   • cloNIDine (CATAPRES) 0 1 mg tablet TAKE 1 TABLET BY MOUTH TWICE A DAY   • cyclobenzaprine (FLEXERIL) 10 mg tablet Take 1 tablet (10 mg total) by mouth 3 (three) times a day as needed for muscle spasms   • Diclofenac Sodium (VOLTAREN) 1 % APPLY 2GM TO UPPER EXTREMITIES TOPICALLY TWICE A DAY **USE DOSING CARD** (DO NOT EXCEED 16 GRAMS DAILY TO ANY AFFECTED JOINT OF THE LOWER EXTREMITIES  DO NOT EXCEED 8 GRAMS DAILY TO ANY AFFECTED JOINT OF THE UPPER EXTREMITIES   DO NOT EXCEED A TOTAL DOSE OF 32 GRAMS DAILY OVER ALL JOINTS)   • gabapentin (Neurontin) 300 mg capsule Take 1 capsule (300 mg total) by mouth 3 (three) times a day   • glucose blood test strip 1 each by Other route daily as needed Use as instructed   • insulin glargine (LANTUS) 100 units/mL subcutaneous injection Inject 24 Units under the skin daily at bedtime   • ipratropium-albuterol (DUO-NEB) 0 5-2 5 mg/3 mL nebulizer solution INHALE 1 VIAL IN NEBULIZER TWICE A DAY   • ketotifen (ZADITOR) 0 025 % ophthalmic solution INSTILL 1 DROP BOTH EYES TWICE A DAY FOR ALLERGY   • lidocaine (LIDODERM) 5 % APPLY UP TO 3 PATCHES TOPICALLY EVERY DAY FOR PAIN (REMOVE PATCH AFTER 12 HOURS; 12 HOURS ON AND 12 HOURS OFF)   • meclizine (ANTIVERT) 12 5 MG tablet Take 1 tablet (12 5 mg total) by mouth 3 (three) times a day as needed for dizziness   • multivitamin (THERAGRAN) TABS Take 1 tablet by mouth daily   • PARoxetine (PAXIL) 30 mg tablet TAKE 1 TABLET BY MOUTH EVERY MORNING   • spironolactone (ALDACTONE) 25 mg tablet TAKE ONE TABLET BY MOUTH DAILY FOR HEART/BLOOD PRESSURE   • traZODone (DESYREL) 50 mg tablet TAKE 1 TABLET BY MOUTH EVERYDAY AT BEDTIME   • amoxicillin-clavulanate (AUGMENTIN) 500-125 mg per tablet TAKE 1 TABLET BY MOUTH TWICE A DAY UNTIL ALL PILLS ARE TAKEN (Patient not taking: Reported on 3/15/2023)     Allergies   Allergen Reactions   • Lisinopril Anaphylaxis   • Metoprolol Bradycardia     Immunization History   Administered Date(s) Administered   • COVID-19 MODERNA VACC 0 5 ML IM 07/02/2021, 07/30/2021, 01/25/2022   • COVID-19, unspecified 04/28/2021   • DT (pediatric) 05/12/1998   • INFLUENZA 11/06/2020, 11/01/2021, 10/15/2022   • Influenza, high dose seasonal 0 7 mL 11/16/2020, 11/12/2021, 10/24/2022   • Pneumococcal 06/23/2009   • Pneumococcal Conjugate 13-Valent 03/19/2018   • Pneumococcal Polysaccharide PPV23 11/02/2018   • Tdap 11/02/2018   • Tetanus Toxoid, Unspecified 09/01/2007       Objective     /78 (BP Location: Left arm, Patient Position: Sitting, Cuff Size: Large)   Pulse 97   Temp (!) 97 2 °F (36 2 °C) (Temporal)   Ht 5' 7" (1 702 m)   Wt 110 kg (242 lb)   SpO2 96%   BMI 37 90 kg/m²     Physical Exam  Jyoti Rizzo MD

## 2023-06-15 ENCOUNTER — RA CDI HCC (OUTPATIENT)
Dept: OTHER | Facility: HOSPITAL | Age: 78
End: 2023-06-15

## 2023-06-15 ENCOUNTER — OFFICE VISIT (OUTPATIENT)
Dept: FAMILY MEDICINE CLINIC | Facility: CLINIC | Age: 78
End: 2023-06-15
Payer: COMMERCIAL

## 2023-06-15 VITALS
BODY MASS INDEX: 37.2 KG/M2 | HEIGHT: 67 IN | OXYGEN SATURATION: 97 % | DIASTOLIC BLOOD PRESSURE: 80 MMHG | SYSTOLIC BLOOD PRESSURE: 140 MMHG | TEMPERATURE: 97.4 F | RESPIRATION RATE: 18 BRPM | HEART RATE: 84 BPM | WEIGHT: 237 LBS

## 2023-06-15 DIAGNOSIS — C34.32 MALIGNANT NEOPLASM OF LOWER LOBE OF LEFT LUNG (HCC): ICD-10-CM

## 2023-06-15 DIAGNOSIS — E78.2 MIXED HYPERLIPIDEMIA: ICD-10-CM

## 2023-06-15 DIAGNOSIS — E11.40 TYPE 2 DIABETES MELLITUS WITH DIABETIC NEUROPATHY, WITHOUT LONG-TERM CURRENT USE OF INSULIN (HCC): ICD-10-CM

## 2023-06-15 DIAGNOSIS — E11.9 TYPE 2 DIABETES MELLITUS WITHOUT COMPLICATION, WITH LONG-TERM CURRENT USE OF INSULIN (HCC): ICD-10-CM

## 2023-06-15 DIAGNOSIS — E66.01 OBESITY, MORBID (HCC): ICD-10-CM

## 2023-06-15 DIAGNOSIS — N18.4 STAGE 4 CHRONIC KIDNEY DISEASE (HCC): ICD-10-CM

## 2023-06-15 DIAGNOSIS — I77.810 DILATATION OF THORACIC AORTA (HCC): ICD-10-CM

## 2023-06-15 DIAGNOSIS — C61 PROSTATE CANCER (HCC): Primary | ICD-10-CM

## 2023-06-15 DIAGNOSIS — Z79.4 TYPE 2 DIABETES MELLITUS WITHOUT COMPLICATION, WITH LONG-TERM CURRENT USE OF INSULIN (HCC): ICD-10-CM

## 2023-06-15 DIAGNOSIS — I10 ESSENTIAL HYPERTENSION: ICD-10-CM

## 2023-06-15 DIAGNOSIS — F33.40 RECURRENT MAJOR DEPRESSIVE DISORDER, IN REMISSION (HCC): ICD-10-CM

## 2023-06-15 LAB — SL AMB POCT HEMOGLOBIN AIC: 6.9 (ref ?–6.5)

## 2023-06-15 PROCEDURE — 82570 ASSAY OF URINE CREATININE: CPT | Performed by: FAMILY MEDICINE

## 2023-06-15 PROCEDURE — 99214 OFFICE O/P EST MOD 30 MIN: CPT | Performed by: FAMILY MEDICINE

## 2023-06-15 PROCEDURE — 82043 UR ALBUMIN QUANTITATIVE: CPT | Performed by: FAMILY MEDICINE

## 2023-06-15 PROCEDURE — 83036 HEMOGLOBIN GLYCOSYLATED A1C: CPT | Performed by: FAMILY MEDICINE

## 2023-06-15 NOTE — PROGRESS NOTES
Name: Sumit Francis      :       MRN: [id number]  Encounter Provider: Venkatesh Tarango MD  Encounter Date: 6/15/2023   Encounter department: [address] [place] [address] Place     1  Prostate cancer [facility])  Assessment & Plan:  Patient is stable  and will continue present plan of care and reassess at next routine visit  All questions about this problem from patient were answered today  2  Type 2 diabetes mellitus with diabetic neuropathy, without long-term current use of insulin ([facility])  -     Hemoglobin A1C; Future; Expected date: 2023  -     Comprehensive metabolic panel; Future; Expected date: 2023    3  Recurrent major depressive disorder, in remission [facility])  Assessment & Plan:  Patient to continue utilizing medical therapy as well and counseling sources as applicable for condition  If  suicidal thought or fear of suicide to contact 911 and seek help immediately  Meds reviewed and patient questions answered today      4  Essential hypertension  Assessment & Plan:  Patient is stable with current anti-hypertensive medicine and continue to follow a low sodium diet and take current medication  All questions about this condition were answered today  5  Type 2 diabetes mellitus without complication, with long-term current use of insulin ([facility] )  Assessment & Plan:  Patient is stable with current meds and discussed a low carb diet  Pt  recommended to see eye doctor and foot doctor for routine screening as per protocol  Recheck A1C  and Cr in 3 months  Patient questions answered today about this condtion  Lab Results   Component Value Date    HGBA1C 6 4 (H) 2022         6  Mixed hyperlipidemia  Assessment & Plan:  Patient  is stable with current medication and we discussed a low fat low cholesterol diet  Weight loss also discussed for this will help lower cholesterol also  Recheck lipids in 6 months        7  Obesity, morbid ([facility] )  Assessment & Plan:  Patient to increase exercise and partake of a diet with less calories to promote  weight loss      8  Stage 4 chronic kidney disease [facility])  Assessment & Plan:  Lab Results   Component Value Date    CREATININE 2 57 (H) 08/05/2022    CREATININE 2 27 (H) 11/12/2021    EGFR 23 08/05/2022    EGFR 32 11/12/2021   Pt to avoid NSAIDs and any IV dyes  Patient to follow up eoither with nephrology or  with us for  further  monitoring of  renal function  9  Malignant neoplasm of lower lobe of left lung [facility])  Assessment & Plan:  Patient is stable  and will continue present plan of care and reassess at next routine visit  All questions about this problem from patient were answered today  BMI Counseling: Body mass index is 37 12 kg/m²  The BMI is above normal  Nutrition recommendations include decreasing portion sizes, encouraging healthy choices of fruits and vegetables, decreasing fast food intake, consuming healthier snacks, limiting drinks that contain sugar, moderation in carbohydrate intake, increasing intake of lean protein, reducing intake of saturated and trans fat and reducing intake of cholesterol  Exercise recommendations include exercising 3-5 times per week  No pharmacotherapy was ordered  Patient referred to PCP  Rationale for BMI follow-up plan is due to patient being overweight or obese  Depression Screening and Follow-up Plan: Patient assessed for underlying major depression  Brief counseling provided and recommend additional follow-up/re-evaluation next office visit  Patient advised to follow-up with PCP for further management  Falls Plan of Care: balance, strength, and gait training instructions were provided  Recommended assistive device to help with gait and balance  Home safety education provided  Patient's shoes and socks removed      Right Foot/Ankle   Right Foot Inspection  Skin Exam: skin normal  Skin not intact, no dry skin, no warmth, no callus, no erythema, no maceration, no abnormal color, no pre-ulcer, no ulcer and no callus  Toe Exam: ROM and strength within normal limits  No tenderness    Sensory   Vibration: intact  Proprioception: intact  Monofilament testing: intact    Vascular  Capillary refills: < 3 seconds  The right DP pulse is 2+  The right PT pulse is 2+  Left Foot/Ankle  Left Foot Inspection  Skin Exam: Skin not intact, no dry skin, no warmth, no erythema, no maceration, normal color, no pre-ulcer, no ulcer and no callus  Toe Exam: No swelling and no tenderness  Sensory   Vibration: intact  Proprioception: intact  Monofilament testing: intact    Vascular  Capillary refills: < 3 seconds  The left DP pulse is 2+  The left PT pulse is 2+  Assign Risk Category  No deformity present  Loss of protective sensation  No weak pulses  Risk: 1      Subjective     74-year-old male here today for checkup on multimedical problems patient with history of COPD also history of lung cancer or possible mesothelioma as per patient he is not really sure which one it is and also prostate cancer  Patient also with some COPD type 2 diabetes hypertension and is here for his checkup  Patient gets all of his medicines from the [place] and we will continue with that we will see about getting his A1c because he is due for today      Review of Systems    Past Medical History:   Diagnosis Date   • Cancer ([facility] )     PROSTATE CANCER   • Colon polyp    • Hyperlipidemia    • PTSD (post-traumatic stress disorder)    • Type 2 diabetes mellitus without complication, with long-term current use of insulin ([facility] ) 5/29/2020   • Type 2 diabetes mellitus without complication, with long-term current use of insulin ([address] ) 5/29/2020     Past Surgical History:   Procedure Laterality Date   • APPENDECTOMY       Family History   Problem Relation Age of Onset   • Diabetes Mother    • Hypertension Mother    • Multiple sclerosis Sister    • Parkinsonism Sister      Social History     Socioeconomic History   • Marital status: /Civil Union     Spouse name: None   • Number of children: None   • Years of education: None   • Highest education level: None   Occupational History   • None   Tobacco Use   • Smoking status: Never   • Smokeless tobacco: Never   Vaping Use   • Vaping Use: Never used   Substance and Sexual Activity   • Alcohol use: Not Currently   • Drug use: Never   • Sexual activity: None   Other Topics Concern   • None   Social History Narrative    Most recent tobacco use screenin-      Do you currently or have you served in the [employer] ([email or web address]:   Yes      If Yes, What branch of service:   [address] [place] [address]      Were you activated, into active duty, as a member of the [employer] or as a Reservist:   No      Marital status:         Exercise level:    Moderate      Diet:   Regular      General stress level:   High      Alcohol intake:   None      Caffeine intake:   None      Seat belts used routinely:   Yes      Sunscreen used routinely:   Yes      Smoke alarm in home:   Yes      Advance directive:   Yes      Social Determinants of Health     Financial Resource Strain: Not on file   Food Insecurity: Not on file   Transportation Needs: Not on file   Physical Activity: Not on file   Stress: Not on file   Social Connections: Not on file   Intimate Partner Violence: Not on file   Housing Stability: Not on file     Current Outpatient Medications on File Prior to Visit   Medication Sig   • amLODIPine (NORVASC) 10 mg tablet TAKE 1 TABLET BY MOUTH EVERY DAY   • Artificial Tear Solution (Just Tears Eye Drops) SOLN INSTILL 1 DROP BOTH EYES TWICE A DAY OR AS NEEDED FOR DRY EYE   • aspirin 325 mg tablet Take 325 mg by mouth daily   • atorvastatin (LIPITOR) 40 mg tablet TAKE 20MG (ONE-HALF TABLET) BY MOUTH EVERY DAY AT 5 PM FOR CHOLESTEROL   • Budesonide-Formoterol Fumarate (SYMBICORT IN) Inhale   • carboxymethylcellulose (REFRESH PLUS) 0 5 % SOLN INSTILL 1 DROP BOTH EYES THREE TIMES A DAY AS NEEDED FOR DRY EYES   • cholecalciferol (VITAMIN D3) 400 units tablet Take 400 Units by mouth daily   • clonazePAM (KlonoPIN) 0 5 MG disintegrating tablet Take 1 tablet (0 5 mg total) by mouth 2 (two) times a day   • cloNIDine (CATAPRES) 0 1 mg tablet TAKE 1 TABLET BY MOUTH TWICE A DAY   • cyclobenzaprine (FLEXERIL) 10 mg tablet Take 1 tablet (10 mg total) by mouth 3 (three) times a day as needed for muscle spasms   • Diclofenac Sodium (VOLTAREN) 1 % APPLY 2GM TO UPPER EXTREMITIES TOPICALLY TWICE A DAY **USE DOSING CARD** (DO NOT EXCEED 16 GRAMS DAILY TO ANY AFFECTED JOINT OF THE LOWER EXTREMITIES  DO NOT EXCEED 8 GRAMS DAILY TO ANY AFFECTED JOINT OF THE UPPER EXTREMITIES   DO NOT EXCEED A TOTAL DOSE OF 32 GRAMS DAILY OVER ALL JOINTS)   • glucose blood test strip 1 each by Other route daily as needed Use as instructed   • insulin glargine (LANTUS) 100 units/mL subcutaneous injection Inject 24 Units under the skin daily at bedtime   • ipratropium-albuterol (DUO-NEB) 0 5-2 5 mg/3 mL nebulizer solution INHALE 1 VIAL IN NEBULIZER TWICE A DAY   • ketotifen (ZADITOR) 0 025 % ophthalmic solution INSTILL 1 DROP BOTH EYES TWICE A DAY FOR ALLERGY   • lidocaine (LIDODERM) 5 % APPLY UP TO 3 PATCHES TOPICALLY EVERY DAY FOR PAIN (REMOVE PATCH AFTER 12 HOURS; 12 HOURS ON AND 12 HOURS OFF)   • meclizine (ANTIVERT) 12 5 MG tablet Take 1 tablet (12 5 mg total) by mouth 3 (three) times a day as needed for dizziness   • multivitamin (THERAGRAN) TABS Take 1 tablet by mouth daily   • PARoxetine (PAXIL) 30 mg tablet TAKE 1 TABLET BY MOUTH EVERY MORNING   • spironolactone (ALDACTONE) 25 mg tablet TAKE ONE TABLET BY MOUTH DAILY FOR HEART/BLOOD PRESSURE   • traZODone (DESYREL) 50 mg tablet TAKE 1 TABLET BY MOUTH EVERYDAY AT BEDTIME   • amoxicillin-clavulanate (AUGMENTIN) 500-125 mg per tablet TAKE 1 TABLET BY MOUTH TWICE A DAY UNTIL ALL PILLS ARE TAKEN (Patient not taking: Reported on 3/15/2023)   • benzonatate (TESSALON) 200 MG capsule Take 1 "capsule (200 mg total) by mouth 3 (three) times a day as needed for cough (Patient not taking: Reported on 6/15/2023)   • gabapentin (Neurontin) 300 mg capsule Take 1 capsule (300 mg total) by mouth 3 (three) times a day     Allergies   Allergen Reactions   • Lisinopril Anaphylaxis   • Metoprolol Bradycardia     Immunization History   Administered Date(s) Administered   • COVID-19 MODERNA VACC 0 5 ML IM 07/02/2021, 07/30/2021, 01/25/2022   • COVID-19, unspecified 04/28/2021   • DT (pediatric) 05/12/1998   • INFLUENZA 11/06/2020, 11/01/2021, 10/15/2022   • Influenza, high dose seasonal 0 7 mL 11/16/2020, 11/12/2021, 10/24/2022   • Pneumococcal 06/23/2009   • Pneumococcal Conjugate 13-Valent 03/19/2018   • Pneumococcal Polysaccharide PPV23 11/02/2018   • Tdap 11/02/2018   • Tetanus Toxoid, Unspecified 09/01/2007       Objective     /80 (BP Location: Left arm, Patient Position: Sitting, Cuff Size: Large)   Pulse 84   Temp (!) 97 4 °F (36 3 °C)   Resp 18   Ht 5' 7\" (1 702 m)   Wt 108 kg (237 lb)   SpO2 97%   BMI 37 12 kg/m²     Physical Exam  Cardiovascular:      Pulses: no weak pulses          Dorsalis pedis pulses are 2+ on the right side and 2+ on the left side  Posterior tibial pulses are 2+ on the right side and 2+ on the left side  Feet:      Right foot:      Skin integrity: No ulcer, skin breakdown, erythema, warmth, callus or dry skin  Left foot:      Skin integrity: No ulcer, skin breakdown, erythema, warmth, callus or dry skin         Sandeep Ta MD  "

## 2023-06-15 NOTE — PROGRESS NOTES
[facility]  coding opportunities     E11 22, E11 36     Chart Reviewed number of suggestions sent to Provider: 2   GR    Patients Insurance     Medicare Insurance: [address] [place] [address]

## 2023-06-15 NOTE — ASSESSMENT & PLAN NOTE
Lab Results   Component Value Date    CREATININE 2 57 (H) 08/05/2022    CREATININE 2 27 (H) 11/12/2021    EGFR 23 08/05/2022    EGFR 32 11/12/2021   Pt to avoid NSAIDs and any IV dyes  Patient to follow up eoither with nephrology or  with us for  further  monitoring of  renal function

## 2023-06-16 ENCOUNTER — TELEPHONE (OUTPATIENT)
Dept: ADMINISTRATIVE | Facility: OTHER | Age: 78
End: 2023-06-16

## 2023-06-16 LAB
CREAT UR-MCNC: 68.2 MG/DL
MICROALBUMIN UR-MCNC: 27 MG/L (ref 0–20)
MICROALBUMIN/CREAT 24H UR: 40 MG/G CREATININE (ref 0–30)

## 2023-06-16 NOTE — LETTER
*  Diabetic Eye Exam Form    Date Requested: 23  Patient: George Dub  Patient : 1945   Referring Provider: Bernie Tee MD      DIABETIC Eye Exam Date _______________________________      Type of Exam MUST be documented for Diabetic Eye Exams. Please CHECK ONE. Retinal Exam       Dilated Retinal Exam       OCT       Optomap-Iris Exam      Fundus Photography       Left Eye - Please check Retinopathy or No Retinopathy        Exam did show retinopathy    Exam did not show retinopathy       Right Eye - Please check Retinopathy or No Retinopathy       Exam did show retinopathy    Exam did not show retinopathy       Comments _______2023 report ___________________________________________________    Practice Providing Exam ______________________________________________    Exam Performed By (print name) _______________________________________      Provider Signature ___________________________________________________      These reports are needed for  compliance. Please fax this completed form and a copy of the Diabetic Eye Exam report to our office located at [address] [place] [address] as soon as possible via Fax [phone] attention Lam Chroman: Phone [phone]  We thank you for your assistance in treating our mutual patient.     *

## 2023-06-16 NOTE — TELEPHONE ENCOUNTER
----- Message from Daniele Rausch sent at 6/15/2023  4:09 PM EDT -----  Regarding: care gap request DM eye exam  06/15/23 4:09 PM    Hello, our patient attached above has had Diabetic Eye Exam completed/performed. Please assist in updating the patient chart by making an External outreach to Dr. Irina Vera facility located in [address]. The date of service is March, 2023.     Thank you,  Nora Toro

## 2023-06-16 NOTE — LETTER
Diabetic Eye Exam Form    Date Requested: 23  Patient: Victor Hugo Montano  Patient : 1945   Referring Provider: Penelope Velasco MD      DIABETIC Eye Exam Date _______________________________      Type of Exam MUST be documented for Diabetic Eye Exams. Please CHECK ONE. Retinal Exam       Dilated Retinal Exam       OCT       Optomap-Iris Exam      Fundus Photography       Left Eye - Please check Retinopathy or No Retinopathy        Exam did show retinopathy    Exam did not show retinopathy       Right Eye - Please check Retinopathy or No Retinopathy       Exam did show retinopathy    Exam did not show retinopathy       Comments __________________________________________________________    Practice Providing Exam ______________________________________________    Exam Performed By (print name) _______________________________________      Provider Signature ___________________________________________________      These reports are needed for  compliance. Please fax this completed form and a copy of the Diabetic Eye Exam report to our office located at [address] [place] [address] as soon as possible via Fax [phone] bryce Beaulieu Olp: Phone [phone]  We thank you for your assistance in treating our mutual patient.

## 2023-06-20 NOTE — TELEPHONE ENCOUNTER
Upon review of the In Basket request and the patient's chart, initial outreach has been made via telephone call to facility. Please see Contacts section for details.      Thank you  Jason Cox MA

## 2023-06-27 NOTE — TELEPHONE ENCOUNTER
As a follow-up, a second attempt has been made for outreach via fax to facility. Please see Contacts section for details.     Thank you  Gideon Felty, MA

## 2023-07-03 DIAGNOSIS — J44.9 CHRONIC OBSTRUCTIVE PULMONARY DISEASE, UNSPECIFIED COPD TYPE (HCC): ICD-10-CM

## 2023-07-03 RX ORDER — BUDESONIDE AND FORMOTEROL FUMARATE DIHYDRATE 160; 4.5 UG/1; UG/1
2 AEROSOL RESPIRATORY (INHALATION) 2 TIMES DAILY
Qty: 10.2 G | Refills: 2 | Status: SHIPPED | OUTPATIENT
Start: 2023-07-03 | End: 2023-07-03

## 2023-07-03 RX ORDER — BUDESONIDE AND FORMOTEROL FUMARATE DIHYDRATE 160; 4.5 UG/1; UG/1
AEROSOL RESPIRATORY (INHALATION)
Qty: 10.2 G | Refills: 2 | Status: SHIPPED | OUTPATIENT
Start: 2023-07-03

## 2023-07-06 DIAGNOSIS — F41.9 ANXIETY: ICD-10-CM

## 2023-07-06 RX ORDER — CLONAZEPAM 0.5 MG/1
0.5 TABLET, ORALLY DISINTEGRATING ORAL 2 TIMES DAILY
Qty: 30 TABLET | Refills: 0 | Status: SHIPPED | OUTPATIENT
Start: 2023-07-06

## 2023-07-06 NOTE — TELEPHONE ENCOUNTER
As a final attempt, a third outreach has been made via fax to facility. Please see Contacts section for details. This encounter will be closed and completed by end of day. Should we receive the requested information because of previous outreach attempts, the requested patient's chart will be updated appropriately.      Thank you  Fely Moreau MA

## 2023-07-28 ENCOUNTER — HOSPITAL ENCOUNTER (EMERGENCY)
Facility: HOSPITAL | Age: 78
Discharge: HOME | End: 2023-07-28
Attending: EMERGENCY MEDICINE
Payer: COMMERCIAL

## 2023-07-28 ENCOUNTER — APPOINTMENT (EMERGENCY)
Dept: CT IMAGING | Facility: HOSPITAL | Age: 78
End: 2023-07-28
Payer: COMMERCIAL

## 2023-07-28 VITALS
DIASTOLIC BLOOD PRESSURE: 79 MMHG | TEMPERATURE: 98.4 F | RESPIRATION RATE: 18 BRPM | OXYGEN SATURATION: 96 % | HEART RATE: 80 BPM | SYSTOLIC BLOOD PRESSURE: 129 MMHG

## 2023-07-28 DIAGNOSIS — M54.50 CHRONIC BILATERAL LOW BACK PAIN WITHOUT SCIATICA: Primary | ICD-10-CM

## 2023-07-28 DIAGNOSIS — G89.29 CHRONIC BILATERAL LOW BACK PAIN WITHOUT SCIATICA: Primary | ICD-10-CM

## 2023-07-28 PROCEDURE — 96372 THER/PROPH/DIAG INJ SC/IM: CPT

## 2023-07-28 PROCEDURE — 72131 CT LUMBAR SPINE W/O DYE: CPT

## 2023-07-28 PROCEDURE — G1004 CDSM NDSC: HCPCS

## 2023-07-28 PROCEDURE — 99284 EMERGENCY DEPT VISIT MOD MDM: CPT | Performed by: EMERGENCY MEDICINE

## 2023-07-28 PROCEDURE — 99285 EMERGENCY DEPT VISIT HI MDM: CPT

## 2023-07-28 RX ORDER — HYDROMORPHONE HCL/PF 1 MG/ML
1 SYRINGE (ML) INJECTION ONCE
Status: COMPLETED | OUTPATIENT
Start: 2023-07-28 | End: 2023-07-28

## 2023-07-28 RX ORDER — METHOCARBAMOL 500 MG/1
500 TABLET, FILM COATED ORAL 3 TIMES DAILY
Qty: 20 TABLET | Refills: 0 | Status: SHIPPED | OUTPATIENT
Start: 2023-07-28

## 2023-07-28 RX ORDER — OXYCODONE HYDROCHLORIDE 5 MG/1
5 TABLET ORAL EVERY 4 HOURS PRN
Qty: 20 TABLET | Refills: 0 | Status: SHIPPED | OUTPATIENT
Start: 2023-07-28 | End: 2023-08-07

## 2023-07-28 RX ORDER — METHOCARBAMOL 500 MG/1
1000 TABLET, FILM COATED ORAL ONCE
Status: COMPLETED | OUTPATIENT
Start: 2023-07-28 | End: 2023-07-28

## 2023-07-28 RX ORDER — PREDNISONE 50 MG/1
50 TABLET ORAL DAILY
Qty: 5 TABLET | Refills: 0 | Status: SHIPPED | OUTPATIENT
Start: 2023-07-28

## 2023-07-28 RX ADMIN — METHOCARBAMOL 1000 MG: 500 TABLET ORAL at 14:15

## 2023-07-28 RX ADMIN — HYDROMORPHONE HYDROCHLORIDE 1 MG: 1 INJECTION, SOLUTION INTRAMUSCULAR; INTRAVENOUS; SUBCUTANEOUS at 12:01

## 2023-07-28 RX ADMIN — HYDROMORPHONE HYDROCHLORIDE 1 MG: 1 INJECTION, SOLUTION INTRAMUSCULAR; INTRAVENOUS; SUBCUTANEOUS at 14:15

## 2023-07-28 NOTE — ED PROVIDER NOTES
History  Chief Complaint   Patient presents with   • Back Pain     Pt reports lower back pain for the past 5 days, reports he picked something up that he shouldn't have. Pain goes down both legs     This is a 44-year-old male who presents emergency department with severe lower back pain that radiates to bilateral lower extremity. He states that he was lifting something about 5 days ago and the pain got worse then. The pain seems to come in waves or spasms. No weakness associated with that. History provided by:  Patient   used: No    Back Pain  Location:  Lumbar spine  Associated symptoms: no abdominal pain, no chest pain, no dysuria and no fever        Prior to Admission Medications   Prescriptions Last Dose Informant Patient Reported? Taking? Artificial Tear Solution (Just Tears Eye Drops) SOLN   Yes No   Sig: INSTILL 1 DROP BOTH EYES TWICE A DAY OR AS NEEDED FOR DRY EYE   Continuous Blood Gluc Sensor (FreeStyle Hugo 2 Sensor) MISC   No No   Sig: Check blood sugars multiple times per day   Diclofenac Sodium (VOLTAREN) 1 %   Yes No   Sig: APPLY 2GM TO UPPER EXTREMITIES TOPICALLY TWICE A DAY **USE DOSING CARD** (DO NOT EXCEED 16 GRAMS DAILY TO ANY AFFECTED JOINT OF THE LOWER EXTREMITIES. DO NOT EXCEED 8 GRAMS DAILY TO ANY AFFECTED JOINT OF THE UPPER EXTREMITIES.  DO NOT EXCEED A TOTAL DOSE OF 32 GRAMS DAILY OVER ALL JOINTS)   PARoxetine (PAXIL) 30 mg tablet   No No   Sig: TAKE 1 TABLET BY MOUTH EVERY MORNING   Symbicort 160-4.5 MCG/ACT inhaler   No No   Sig: INHALE 2 PUFFS TWICE A DAY   amLODIPine (NORVASC) 10 mg tablet   No No   Sig: TAKE 1 TABLET BY MOUTH EVERY DAY   amoxicillin-clavulanate (AUGMENTIN) 500-125 mg per tablet   Yes No   Sig: TAKE 1 TABLET BY MOUTH TWICE A DAY UNTIL ALL PILLS ARE TAKEN   Patient not taking: Reported on 3/15/2023   aspirin 325 mg tablet   Yes No   Sig: Take 325 mg by mouth daily   atorvastatin (LIPITOR) 40 mg tablet   Yes No   Sig: TAKE 20MG (ONE-HALF TABLET) BY MOUTH EVERY DAY AT 5 PM FOR CHOLESTEROL   benzonatate (TESSALON) 200 MG capsule   No No   Sig: Take 1 capsule (200 mg total) by mouth 3 (three) times a day as needed for cough   Patient not taking: Reported on 6/15/2023   carboxymethylcellulose (REFRESH PLUS) 0.5 % SOLN   Yes No   Sig: INSTILL 1 DROP BOTH EYES THREE TIMES A DAY AS NEEDED FOR DRY EYES   cholecalciferol (VITAMIN D3) 400 units tablet  Spouse/Significant Other Yes No   Sig: Take 400 Units by mouth daily   cloNIDine (CATAPRES) 0.1 mg tablet   No No   Sig: TAKE 1 TABLET BY MOUTH TWICE A DAY   clonazePAM (KlonoPIN) 0.5 MG disintegrating tablet   No No   Sig: Take 1 tablet (0.5 mg total) by mouth 2 (two) times a day   cyclobenzaprine (FLEXERIL) 10 mg tablet   No No   Sig: Take 1 tablet (10 mg total) by mouth 3 (three) times a day as needed for muscle spasms   gabapentin (Neurontin) 300 mg capsule   No No   Sig: Take 1 capsule (300 mg total) by mouth 3 (three) times a day   glucose blood test strip  Spouse/Significant Other Yes No   Si each by Other route daily as needed Use as instructed   insulin glargine (LANTUS) 100 units/mL subcutaneous injection   Yes No   Sig: Inject 24 Units under the skin daily at bedtime   ipratropium-albuterol (DUO-NEB) 0.5-2.5 mg/3 mL nebulizer solution   Yes No   Sig: INHALE 1 VIAL IN NEBULIZER TWICE A DAY   ketotifen (ZADITOR) 0.025 % ophthalmic solution   Yes No   Sig: INSTILL 1 DROP BOTH EYES TWICE A DAY FOR ALLERGY   lidocaine (LIDODERM) 5 %   Yes No   Sig: APPLY UP TO 3 PATCHES TOPICALLY EVERY DAY FOR PAIN (REMOVE PATCH AFTER 12 HOURS; 12 HOURS ON AND 12 HOURS OFF)   meclizine (ANTIVERT) 12.5 MG tablet   No No   Sig: Take 1 tablet (12.5 mg total) by mouth 3 (three) times a day as needed for dizziness   multivitamin (THERAGRAN) TABS  Spouse/Significant Other Yes No   Sig: Take 1 tablet by mouth daily   spironolactone (ALDACTONE) 25 mg tablet   Yes No   Sig: TAKE ONE TABLET BY MOUTH DAILY FOR HEART/BLOOD PRESSURE   traZODone (DESYREL) 50 mg tablet   No No   Sig: TAKE 1 TABLET BY MOUTH EVERYDAY AT BEDTIME      Facility-Administered Medications: None       Past Medical History:   Diagnosis Date   • Cancer ([address])     PROSTATE CANCER   • Colon polyp    • Hyperlipidemia    • PTSD (post-traumatic stress disorder)    • Type 2 diabetes mellitus without complication, with long-term current use of insulin ([address]) 5/29/2020   • Type 2 diabetes mellitus without complication, with long-term current use of insulin ([address]) 5/29/2020       Past Surgical History:   Procedure Laterality Date   • APPENDECTOMY         Family History   Problem Relation Age of Onset   • Diabetes Mother    • Hypertension Mother    • Multiple sclerosis Sister    • Parkinsonism Sister      I have reviewed and agree with the history as documented. E-Cigarette/Vaping   • E-Cigarette Use Never User      E-Cigarette/Vaping Substances   • Nicotine No    • THC No    • CBD No    • Flavoring No    • Other No    • Unknown No      Social History     Tobacco Use   • Smoking status: Never   • Smokeless tobacco: Never   Vaping Use   • Vaping Use: Never used   Substance Use Topics   • Alcohol use: Not Currently   • Drug use: Never       Review of Systems   Constitutional: Negative for chills and fever. HENT: Negative for ear pain and sore throat. Eyes: Negative for pain and visual disturbance. Respiratory: Negative for cough and shortness of breath. Cardiovascular: Negative for chest pain and palpitations. Gastrointestinal: Negative for abdominal pain and vomiting. Genitourinary: Negative for dysuria and hematuria. Musculoskeletal: Positive for back pain. Negative for arthralgias. Skin: Negative for color change and rash. Neurological: Negative for seizures and syncope. All other systems reviewed and are negative. Physical Exam  Physical Exam  Vitals and nursing note reviewed. Constitutional:       General: He is not in acute distress. Appearance: Normal appearance. He is well-developed. He is obese. HENT:      Head: Normocephalic and atraumatic. Right Ear: External ear normal.      Left Ear: External ear normal.   Eyes:      Conjunctiva/sclera: Conjunctivae normal.   Cardiovascular:      Rate and Rhythm: Normal rate and regular rhythm. Pulses: Normal pulses. Heart sounds: Normal heart sounds. No murmur heard. Pulmonary:      Effort: Pulmonary effort is normal. No respiratory distress. Breath sounds: Normal breath sounds. Abdominal:      Palpations: Abdomen is soft. Tenderness: There is no abdominal tenderness. Musculoskeletal:         General: No swelling. Cervical back: Neck supple. Comments: + pain lower lumbar and SI area  No swelling  No ecchymosis    Skin:     General: Skin is warm and dry. Capillary Refill: Capillary refill takes less than 2 seconds. Neurological:      General: No focal deficit present. Mental Status: He is alert and oriented to person, place, and time. Psychiatric:         Mood and Affect: Mood normal.         Thought Content: Thought content normal.         Judgment: Judgment normal.         Vital Signs  ED Triage Vitals [07/28/23 1156]   Temperature Pulse Respirations Blood Pressure SpO2   98.4 °F (36.9 °C) 83 16 (!) 184/97 98 %      Temp src Heart Rate Source Patient Position - Orthostatic VS BP Location FiO2 (%)   -- Monitor Lying Right arm --      Pain Score       10 - Worst Possible Pain           Vitals:    07/28/23 1156 07/28/23 1230 07/28/23 1300 07/28/23 1330   BP: (!) 184/97 150/81 144/86 136/87   Pulse: 83 89 85 75   Patient Position - Orthostatic VS: Lying            Visual Acuity      ED Medications  Medications   HYDROmorphone (DILAUDID) injection 1 mg (1 mg Intramuscular Given 7/28/23 1201)       Diagnostic Studies  Results Reviewed     None                 CT spine lumbar without contrast   Final Result by KATERINA Huynh MD (07/28 1365)   No acute fracture. Transitional L5 vertebra. Multilevel degenerative spondylosis as described. Workstation performed: [id number]                    Procedures  Procedures         ED Course                               SBIRT 22yo+    Flowsheet Row Most Recent Value   Initial Alcohol Screen: US AUDIT-C     1. How often do you have a drink containing alcohol? 0 Filed at: 07/28/2023 1209   2. How many drinks containing alcohol do you have on a typical day you are drinking? 0 Filed at: 07/28/2023 1209   3b. FEMALE Any Age, or MALE 65+: How often do you have 4 or more drinks on one occassion? 0 Filed at: 07/28/2023 1209   Audit-C Score 0 Filed at: 07/28/2023 1209   [facility]: How many times in the past year have you. .. Used an illegal drug or used a prescription medication for non-medical reasons? Never Filed at: 07/28/2023 1209                    Medical Decision Making  CT scan mod/severe lumbar spine disease  NON acute     Amount and/or Complexity of Data Reviewed  Radiology: ordered. Risk  Prescription drug management. Disposition  Final diagnoses:   Chronic bilateral low back pain without sciatica     Time reflects when diagnosis was documented in both MDM as applicable and the Disposition within this note     Time User Action Codes Description Comment    7/28/2023  2:03 PM Parag Orozco Add [M54.50,  G89.29] Chronic bilateral low back pain without sciatica       ED Disposition     ED Disposition   Discharge    Condition   Stable    Date/Time   Fri Jul 28, 2023  2:03 PM    [address] [facility] 28 discharge to home/self care.                Follow-up Information     Follow up With Specialties Details Why Contact Info    Annamarie Bass MD Family Medicine In 1 week  [address] [facility] Male [address] [facility]  [phone]            Patient's Medications   Discharge Prescriptions    OXYCODONE (ROXICODONE) 5 IMMEDIATE RELEASE TABLET    Take 1 tablet (5 mg total) by mouth every 4 (four) hours as needed for moderate pain for up to 10 days Max Daily Amount: 30 mg       Start Date: 7/28/2023 End Date: 8/7/2023       Order Dose: 5 mg       Quantity: 20 tablet    Refills: 0    PREDNISONE 50 MG TABLET    Take 1 tablet (50 mg total) by mouth daily       Start Date: 7/28/2023 End Date: --       Order Dose: 50 mg       Quantity: 5 tablet    Refills: 0           PDMP Review       Value Time User    PDMP Reviewed  Yes 7/6/2023  8:28 PM Torres Villanueva MD          ED Provider  Electronically Signed by           Carmen Rivas MD  07/28/23 9607

## 2023-07-28 NOTE — DISCHARGE INSTRUCTIONS
A  personal message from Dr. Andrews Reich,  Thank you so much for allowing me to care for you today. I pride myself in the care and attention I give all my patients. I hope you were a witness to this tonight. If for any reason your condition does not improve or worsens, or you have a question that was not answered during your visit you can feel free to text me on my personal phone #  # [phone]. I will answer to your message and continue your care past your emergency room visit. Please understand that although you are being discharged because your condition has been deemed stable and able to be managed on an outpatient setting. However your condition may worsen as part of the natural progression of the illness/condition, if this occurs please come back to the emergency department for a repeat evaluation.

## 2023-07-31 ENCOUNTER — NURSE TRIAGE (OUTPATIENT)
Dept: PHYSICAL THERAPY | Facility: OTHER | Age: 78
End: 2023-07-31

## 2023-07-31 DIAGNOSIS — M54.50 ACUTE BILATERAL LOW BACK PAIN, UNSPECIFIED WHETHER SCIATICA PRESENT: Primary | ICD-10-CM

## 2023-07-31 NOTE — TELEPHONE ENCOUNTER
Additional Information  • Negative: Is this related to a work injury? • Negative: Is this related to an MVA? • Negative: Are you currently recieving homecare services? Background - Initial Assessment  Clinical complaint: Pain is bilat low back, radiates down bilat legs to the knees. Has numbness and tingling in bilat thighs, on and off. Started 1 week prior to ED visit, approx 7/21/23. NKI Bent over to pick something up.  Was seen in ED 7/28/23  Date of onset: 7/21/23  Frequency of pain: intermittent  Quality of pain: numb and throbbing and tingling    Protocols used: SL AMB COMPREHENSIVE SPINE PROGRAM PROTOCOL X Size Of Lesion In Cm (Optional): 0

## 2023-07-31 NOTE — TELEPHONE ENCOUNTER
Additional Information  • Negative: Has the patient had unexplained weight loss? • Negative: Does the patient have a fever? • Negative: Is the patient experiencing blood in sputum? • Negative: Has the patient experienced major trauma? (fall from height, high speed collision, direct blow to spine) and is also experiencing nausea, light-headedness, or loss of consciousness? • Negative: Is the patient experiencing urine retention? • Negative: Is the patient experiencing acute drop foot or paralysis? • Negative: Is this a chronic condition? Protocols used:  AMB COMPREHENSIVE SPINE PROGRAM PROTOCOL    Nurse completed triage and NO RF s/s present. Referral entered for the [address] [place] [address] site and contact/phone number info given to him as well. Patients information was sent to the preferred site and pt made aware clerical would be calling to schedule appointment. Nurse encouraged him to call site if he does not hear from clerical beforehand. Patient and wife Agreed. Patient did not voice any additional questions or concerns at this time. Patient is aware current complaints, relevant dx, and treatment/options will be discussed at time of consult. All information regarding plan to be evaluated by the therapist was reviewed. Patient is in agreement with nurse's explanation of intended care path discussed  during today's encounter. Nurse also offered a call from the [facility] counselor d/t SBT score. Patient declined. Patient was pleasant and appropriate during this encounter. Patient appreciative of CB and referral placement for evaluation with [facility].     Nurse wished him well and referral closed.

## 2023-08-18 ENCOUNTER — TELEPHONE (OUTPATIENT)
Dept: PHYSICAL THERAPY | Facility: OTHER | Age: 78
End: 2023-08-18

## 2023-08-18 NOTE — TELEPHONE ENCOUNTER
Contacted patient for a follow up call regarding her CSP triage. LVM stating reason for the call and that no return call is necessary unless they have any questions or concerns. Ph# and business hrs were provided.

## 2023-08-21 ENCOUNTER — OFFICE VISIT (OUTPATIENT)
Dept: FAMILY MEDICINE CLINIC | Facility: CLINIC | Age: 78
End: 2023-08-21
Payer: COMMERCIAL

## 2023-08-21 VITALS
OXYGEN SATURATION: 97 % | HEART RATE: 96 BPM | TEMPERATURE: 97.7 F | HEIGHT: 67 IN | BODY MASS INDEX: 36.88 KG/M2 | WEIGHT: 235 LBS | SYSTOLIC BLOOD PRESSURE: 139 MMHG | DIASTOLIC BLOOD PRESSURE: 70 MMHG

## 2023-08-21 DIAGNOSIS — E11.40 TYPE 2 DIABETES MELLITUS WITH DIABETIC NEUROPATHY, WITHOUT LONG-TERM CURRENT USE OF INSULIN (HCC): ICD-10-CM

## 2023-08-21 DIAGNOSIS — Z79.4 TYPE 2 DIABETES MELLITUS WITHOUT COMPLICATION, WITH LONG-TERM CURRENT USE OF INSULIN (HCC): ICD-10-CM

## 2023-08-21 DIAGNOSIS — M54.50 CHRONIC BILATERAL LOW BACK PAIN WITHOUT SCIATICA: ICD-10-CM

## 2023-08-21 DIAGNOSIS — F33.40 RECURRENT MAJOR DEPRESSIVE DISORDER, IN REMISSION (HCC): Primary | ICD-10-CM

## 2023-08-21 DIAGNOSIS — C34.32 MALIGNANT NEOPLASM OF LOWER LOBE OF LEFT LUNG (HCC): ICD-10-CM

## 2023-08-21 DIAGNOSIS — G89.29 CHRONIC BILATERAL LOW BACK PAIN WITHOUT SCIATICA: ICD-10-CM

## 2023-08-21 DIAGNOSIS — J44.9 CHRONIC OBSTRUCTIVE PULMONARY DISEASE, UNSPECIFIED COPD TYPE (HCC): ICD-10-CM

## 2023-08-21 DIAGNOSIS — I10 ESSENTIAL HYPERTENSION: ICD-10-CM

## 2023-08-21 DIAGNOSIS — E66.01 OBESITY, MORBID (HCC): ICD-10-CM

## 2023-08-21 DIAGNOSIS — E11.9 TYPE 2 DIABETES MELLITUS WITHOUT COMPLICATION, WITH LONG-TERM CURRENT USE OF INSULIN (HCC): ICD-10-CM

## 2023-08-21 PROCEDURE — 99213 OFFICE O/P EST LOW 20 MIN: CPT | Performed by: FAMILY MEDICINE

## 2023-08-21 RX ORDER — OFLOXACIN 3 MG/ML
SOLUTION/ DROPS OPHTHALMIC
COMMUNITY
Start: 2023-08-01

## 2023-08-21 RX ORDER — OXYCODONE HYDROCHLORIDE 10 MG/1
10 TABLET ORAL EVERY 6 HOURS PRN
Qty: 40 TABLET | Refills: 0 | Status: SHIPPED | OUTPATIENT
Start: 2023-08-21

## 2023-08-21 NOTE — ASSESSMENT & PLAN NOTE
Patient is stable with current meds and discussed a low carb diet. Pt  recommended to see eye doctor and foot doctor for routine screening as per protocol. Recheck A1C  and Cr in 3 months. Patient questions answered today about this condtion.   Lab Results   Component Value Date    HGBA1C 6.9 (A) 06/15/2023

## 2023-08-21 NOTE — ASSESSMENT & PLAN NOTE
Continue with current inhalation therapy to maximize lung function. AVOID ALL TOBACCO USAGE and please obtain flu vaccine  yearly.

## 2023-08-21 NOTE — PROGRESS NOTES
Name: Royce Adame      :       MRN: [id number]  Encounter Provider: Seth Richard MD  Encounter Date: 2023   Encounter department: [facility]     1. Recurrent major depressive disorder, in remission [facility])  Assessment & Plan:  Patient to continue utilizing medical therapy as well and counseling sources as applicable for condition. If  suicidal thought or fear of suicide to contact 911 and seek help immediately. Meds reviewed and patient questions answered today      2. Malignant neoplasm of lower lobe of left lung [facility])  Assessment & Plan:  Patient is stable  and will continue present plan of care and reassess at next routine visit. All questions about this problem from patient were answered today. 3. Chronic obstructive pulmonary disease, unspecified COPD type ([address])  Assessment & Plan:  Continue with current inhalation therapy to maximize lung function. AVOID ALL TOBACCO USAGE and please obtain flu vaccine  yearly. 4. Obesity, morbid ([address])  Assessment & Plan:  Patient to increase exercise and partake of a diet with less calories to promote  weight loss      5. Type 2 diabetes mellitus without complication, with long-term current use of insulin ([address])  Assessment & Plan:  Patient is stable with current meds and discussed a low carb diet. Pt  recommended to see eye doctor and foot doctor for routine screening as per protocol. Recheck A1C  and Cr in 3 months. Patient questions answered today about this condtion. Lab Results   Component Value Date    HGBA1C 6.9 (A) 06/15/2023       Orders:  -     Hemoglobin A1C; Future; Expected date: 2023  -     Comprehensive metabolic panel; Future; Expected date: 2023    6. Essential hypertension  Assessment & Plan:  Patient is stable with current anti-hypertensive medicine and continue to follow a low sodium diet and take current medication.  All questions about this condition were answered today. 7. Chronic bilateral low back pain without sciatica  -     Ambulatory Referral to [facility]; Future  -     oxyCODONE (ROXICODONE) 10 MG TABS; Take 1 tablet (10 mg total) by mouth every 6 (six) hours as needed for moderate pain Max Daily Amount: 40 mg    8. Type 2 diabetes mellitus with diabetic neuropathy, without long-term current use of insulin (HCC)  -     Continuous Blood Gluc Sensor (FreeStyle Hugo 2 Sensor) MISC; Check blood sugars multiple times per day        Depression Screening and Follow-up Plan: Patient assessed for underlying major depression. Brief counseling provided and recommend additional follow-up/re-evaluation next office visit. Falls Plan of Care: balance, strength, and gait training instructions were provided. Home safety education provided. Subjective     Is a 16-year-old male today for checkup on low back pain patient recently was in the emergency room and had some medication for low back pain. Patient had a set up an appointment with the spine center but had some problems and difficulties with making a connection and I will reconsult them to talk to him about taking care of his low back pain. Patient states that Dilaudid did not help his back and he needs further evaluation. Review of Systems   Constitutional: Negative for activity change, appetite change, chills, fatigue, fever and unexpected weight change. HENT: Negative for congestion, ear pain, hearing loss, mouth sores, postnasal drip, sinus pressure, sinus pain, sneezing and sore throat. Respiratory: Negative for apnea, cough, shortness of breath and wheezing. Cardiovascular: Negative for chest pain, palpitations and leg swelling. Gastrointestinal: Negative for abdominal pain, constipation, diarrhea, nausea and vomiting. Endocrine: Negative for cold intolerance and heat intolerance. Genitourinary: Negative for dysuria, frequency and hematuria.    Musculoskeletal: Positive for back pain. Negative for arthralgias, gait problem, joint swelling and neck pain. Skin: Negative for rash. Neurological: Negative for dizziness, weakness and numbness. Hematological: Does not bruise/bleed easily. Psychiatric/Behavioral: Negative for agitation, behavioral problems, confusion, hallucinations and sleep disturbance. The patient is not nervous/anxious. Past Medical History:   Diagnosis Date   • Cancer ([facility])     PROSTATE CANCER   • Colon polyp    • Hyperlipidemia    • PTSD (post-traumatic stress disorder)    • Type 2 diabetes mellitus without complication, with long-term current use of insulin ([facility]) 2020   • Type 2 diabetes mellitus without complication, with long-term current use of insulin ([facility]) 2020     Past Surgical History:   Procedure Laterality Date   • APPENDECTOMY       Family History   Problem Relation Age of Onset   • Diabetes Mother    • Hypertension Mother    • Multiple sclerosis Sister    • Parkinsonism Sister      Social History     Socioeconomic History   • Marital status: /Civil Union     Spouse name: None   • Number of children: None   • Years of education: None   • Highest education level: None   Occupational History   • None   Tobacco Use   • Smoking status: Never   • Smokeless tobacco: Never   Vaping Use   • Vaping Use: Never used   Substance and Sexual Activity   • Alcohol use: Not Currently   • Drug use: Never   • Sexual activity: None   Other Topics Concern   • None   Social History Narrative    Most recent tobacco use screenin-      Do you currently or have you served in the [address] [place] [address]:   Yes      If Yes, What branch of service:   [address] [place] [address]      Were you activated, into active duty, as a member of the [employer] or as a Reservist:   No      Marital status:         Exercise level:    Moderate      Diet:   Regular      General stress level:   High      Alcohol intake:   None      Caffeine intake:   None      Seat belts used routinely:   Yes      Sunscreen used routinely:   Yes      Smoke alarm in home:   Yes      Advance directive:   Yes      Social Determinants of Health     Financial Resource Strain: Not on file   Food Insecurity: Not on file   Transportation Needs: Not on file   Physical Activity: Not on file   Stress: Not on file   Social Connections: Not on file   Intimate Partner Violence: Not on file   Housing Stability: Not on file     Current Outpatient Medications on File Prior to Visit   Medication Sig   • amLODIPine (NORVASC) 10 mg tablet TAKE 1 TABLET BY MOUTH EVERY DAY   • amoxicillin-clavulanate (AUGMENTIN) 500-125 mg per tablet TAKE 1 TABLET BY MOUTH TWICE A DAY UNTIL ALL PILLS ARE TAKEN (Patient not taking: Reported on 3/15/2023)   • Artificial Tear Solution (Just Tears Eye Drops) SOLN INSTILL 1 DROP BOTH EYES TWICE A DAY OR AS NEEDED FOR DRY EYE   • aspirin 325 mg tablet Take 325 mg by mouth daily   • atorvastatin (LIPITOR) 40 mg tablet TAKE 20MG (ONE-HALF TABLET) BY MOUTH EVERY DAY AT 5 PM FOR CHOLESTEROL   • benzonatate (TESSALON) 200 MG capsule Take 1 capsule (200 mg total) by mouth 3 (three) times a day as needed for cough (Patient not taking: Reported on 6/15/2023)   • carboxymethylcellulose (REFRESH PLUS) 0.5 % SOLN INSTILL 1 DROP BOTH EYES THREE TIMES A DAY AS NEEDED FOR DRY EYES   • cholecalciferol (VITAMIN D3) 400 units tablet Take 400 Units by mouth daily   • clonazePAM (KlonoPIN) 0.5 MG disintegrating tablet Take 1 tablet (0.5 mg total) by mouth 2 (two) times a day   • cloNIDine (CATAPRES) 0.1 mg tablet TAKE 1 TABLET BY MOUTH TWICE A DAY   • cyclobenzaprine (FLEXERIL) 10 mg tablet Take 1 tablet (10 mg total) by mouth 3 (three) times a day as needed for muscle spasms   • Diclofenac Sodium (VOLTAREN) 1 % APPLY 2GM TO UPPER EXTREMITIES TOPICALLY TWICE A DAY **USE DOSING CARD** (DO NOT EXCEED 16 GRAMS DAILY TO ANY AFFECTED JOINT OF THE LOWER EXTREMITIES.  DO NOT EXCEED 8 GRAMS DAILY TO ANY AFFECTED JOINT OF THE UPPER EXTREMITIES.  DO NOT EXCEED A TOTAL DOSE OF 32 GRAMS DAILY OVER ALL JOINTS)   • gabapentin (Neurontin) 300 mg capsule Take 1 capsule (300 mg total) by mouth 3 (three) times a day   • glucose blood test strip 1 each by Other route daily as needed Use as instructed   • insulin glargine (LANTUS) 100 units/mL subcutaneous injection Inject 24 Units under the skin daily at bedtime   • ipratropium-albuterol (DUO-NEB) 0.5-2.5 mg/3 mL nebulizer solution INHALE 1 VIAL IN NEBULIZER TWICE A DAY   • ketotifen (ZADITOR) 0.025 % ophthalmic solution INSTILL 1 DROP BOTH EYES TWICE A DAY FOR ALLERGY   • lidocaine (LIDODERM) 5 % APPLY UP TO 3 PATCHES TOPICALLY EVERY DAY FOR PAIN (REMOVE PATCH AFTER 12 HOURS; 12 HOURS ON AND 12 HOURS OFF)   • meclizine (ANTIVERT) 12.5 MG tablet Take 1 tablet (12.5 mg total) by mouth 3 (three) times a day as needed for dizziness   • methocarbamol (ROBAXIN) 500 mg tablet Take 1 tablet (500 mg total) by mouth 3 (three) times a day   • multivitamin (THERAGRAN) TABS Take 1 tablet by mouth daily   • ofloxacin (OCUFLOX) 0.3 % ophthalmic solution PLEASE SEE ATTACHED FOR DETAILED DIRECTIONS   • PARoxetine (PAXIL) 30 mg tablet TAKE 1 TABLET BY MOUTH EVERY MORNING   • predniSONE 50 mg tablet Take 1 tablet (50 mg total) by mouth daily   • spironolactone (ALDACTONE) 25 mg tablet TAKE ONE TABLET BY MOUTH DAILY FOR HEART/BLOOD PRESSURE   • Symbicort 160-4.5 MCG/ACT inhaler INHALE 2 PUFFS TWICE A DAY   • traZODone (DESYREL) 50 mg tablet TAKE 1 TABLET BY MOUTH EVERYDAY AT BEDTIME   • [DISCONTINUED] Continuous Blood Gluc Sensor (FreeStyle Hugo 2 Sensor) MISC Check blood sugars multiple times per day     Allergies   Allergen Reactions   • Lisinopril Anaphylaxis   • Metoprolol Bradycardia     Immunization History   Administered Date(s) Administered   • COVID-19 MODERNA VACC 0.5 ML IM 07/02/2021, 07/30/2021, 01/25/2022   • COVID-19, unspecified 04/28/2021   • DT (pediatric) 05/12/1998   • INFLUENZA 11/06/2020, 11/01/2021, 10/15/2022   • Influenza, high dose seasonal 0.7 mL 11/16/2020, 11/12/2021, 10/24/2022   • Pneumococcal 06/23/2009   • Pneumococcal Conjugate 13-Valent 03/19/2018   • Pneumococcal Polysaccharide PPV23 11/02/2018   • Tdap 11/02/2018   • Tetanus Toxoid, Unspecified 09/01/2007       Objective     /70 (BP Location: Right arm, Patient Position: Sitting, Cuff Size: Large)   Pulse 96   Temp 97.7 °F (36.5 °C)   Ht 5' 7" (1.702 m)   Wt 107 kg (235 lb)   SpO2 97%   BMI 36.81 kg/m²     Physical Exam  Vitals and nursing note reviewed. Constitutional:       Appearance: He is well-developed. HENT:      Head: Normocephalic and atraumatic. Nose: Nose normal.   Eyes:      General: No scleral icterus. Conjunctiva/sclera: Conjunctivae normal.      Pupils: Pupils are equal, round, and reactive to light. Neck:      Thyroid: No thyromegaly. Cardiovascular:      Rate and Rhythm: Normal rate and regular rhythm. Heart sounds: Normal heart sounds. Pulmonary:      Effort: Pulmonary effort is normal. No respiratory distress. Breath sounds: Normal breath sounds. No wheezing. Abdominal:      General: Bowel sounds are normal.      Palpations: Abdomen is soft. Tenderness: There is no abdominal tenderness. There is no guarding or rebound. Musculoskeletal:         General: Normal range of motion. Cervical back: Normal range of motion and neck supple. Skin:     General: Skin is warm and dry. Findings: No rash. Neurological:      Mental Status: He is alert and oriented to person, place, and time.    Psychiatric:         Behavior: Behavior normal.       Sagar Rivera MD

## 2023-08-22 ENCOUNTER — TELEPHONE (OUTPATIENT)
Dept: PHYSICAL THERAPY | Facility: OTHER | Age: 78
End: 2023-08-22

## 2023-08-22 NOTE — TELEPHONE ENCOUNTER
Call placed to the patient per [facility] referral.    Explained to the patient that he already has a referral in for an evaluation with [facility] PT at our [place] site. Referral/triage was from 7/31/23 for the same back pain. Patient states no one from that site ever reached out to him. I provided him with the PT site phone number and encouraged him to contact them as soon as possible. Patient appreciative for the call and assistance. Referral Closed.

## 2023-09-06 ENCOUNTER — RA CDI HCC (OUTPATIENT)
Dept: OTHER | Facility: HOSPITAL | Age: 78
End: 2023-09-06

## 2023-09-06 NOTE — PROGRESS NOTES
[address] coding opportunities       Chart reviewed, no opportunity found: [address] Review     Patients Insurance     Medicare Insurance: [employer]

## 2023-09-09 ENCOUNTER — RA CDI HCC (OUTPATIENT)
Dept: OTHER | Facility: HOSPITAL | Age: 78
End: 2023-09-09

## 2023-09-10 NOTE — PROGRESS NOTES
[facility] [address] [facility] coding opportunities     E11.22, E11.36     Chart Reviewed number of suggestions sent to Provider: 2     GR    Patients Insurance     Medicare Insurance: [id number] [place]

## 2023-09-15 ENCOUNTER — OFFICE VISIT (OUTPATIENT)
Dept: FAMILY MEDICINE CLINIC | Facility: CLINIC | Age: 78
End: 2023-09-15
Payer: COMMERCIAL

## 2023-09-15 VITALS
TEMPERATURE: 98.2 F | DIASTOLIC BLOOD PRESSURE: 72 MMHG | HEART RATE: 96 BPM | WEIGHT: 235 LBS | BODY MASS INDEX: 36.88 KG/M2 | SYSTOLIC BLOOD PRESSURE: 126 MMHG | OXYGEN SATURATION: 98 % | HEIGHT: 67 IN

## 2023-09-15 DIAGNOSIS — E66.01 OBESITY, MORBID (HCC): ICD-10-CM

## 2023-09-15 DIAGNOSIS — I42.9 CARDIOMYOPATHY, UNSPECIFIED TYPE (HCC): ICD-10-CM

## 2023-09-15 DIAGNOSIS — Z00.00 WELL ADULT EXAM: Primary | ICD-10-CM

## 2023-09-15 DIAGNOSIS — J44.9 CHRONIC OBSTRUCTIVE PULMONARY DISEASE, UNSPECIFIED COPD TYPE (HCC): ICD-10-CM

## 2023-09-15 DIAGNOSIS — F33.40 RECURRENT MAJOR DEPRESSIVE DISORDER, IN REMISSION (HCC): ICD-10-CM

## 2023-09-15 DIAGNOSIS — Z23 IMMUNIZATION DUE: ICD-10-CM

## 2023-09-15 DIAGNOSIS — E78.2 MIXED HYPERLIPIDEMIA: ICD-10-CM

## 2023-09-15 DIAGNOSIS — N18.4 STAGE 4 CHRONIC KIDNEY DISEASE (HCC): ICD-10-CM

## 2023-09-15 DIAGNOSIS — I10 ESSENTIAL HYPERTENSION: ICD-10-CM

## 2023-09-15 DIAGNOSIS — E11.40 TYPE 2 DIABETES MELLITUS WITH DIABETIC NEUROPATHY, WITHOUT LONG-TERM CURRENT USE OF INSULIN (HCC): ICD-10-CM

## 2023-09-15 DIAGNOSIS — C34.32 MALIGNANT NEOPLASM OF LOWER LOBE OF LEFT LUNG (HCC): ICD-10-CM

## 2023-09-15 DIAGNOSIS — C61 PROSTATE CANCER (HCC): ICD-10-CM

## 2023-09-15 LAB — SL AMB POCT HEMOGLOBIN AIC: 6.5 (ref ?–6.5)

## 2023-09-15 PROCEDURE — 99214 OFFICE O/P EST MOD 30 MIN: CPT | Performed by: FAMILY MEDICINE

## 2023-09-15 PROCEDURE — 90662 IIV NO PRSV INCREASED AG IM: CPT | Performed by: FAMILY MEDICINE

## 2023-09-15 PROCEDURE — G0008 ADMIN INFLUENZA VIRUS VAC: HCPCS | Performed by: FAMILY MEDICINE

## 2023-09-15 PROCEDURE — G0439 PPPS, SUBSEQ VISIT: HCPCS | Performed by: FAMILY MEDICINE

## 2023-09-15 PROCEDURE — 83036 HEMOGLOBIN GLYCOSYLATED A1C: CPT | Performed by: FAMILY MEDICINE

## 2023-09-15 NOTE — PROGRESS NOTES
Assessment and Plan:     Problem List Items Addressed This Visit        Respiratory    Chronic obstructive pulmonary disease, unspecified COPD type ([address])     Continue with current inhalation therapy to maximize lung function. AVOID ALL TOBACCO USAGE and please obtain flu vaccine  yearly. Malignant neoplasm of lower lobe of left lung [facility])     Patient is stable  and will continue present plan of care and reassess at next routine visit. All questions about this problem from patient were answered today. Cardiovascular and Mediastinum    Essential hypertension     Patient is stable with current anti-hypertensive medicine and continue to follow a low sodium diet and take current medication. All questions about this condition were answered today. Cardiomyopathy, unspecified type [facility])     Patient is stable  and will continue present plan of care and reassess at next routine visit. All questions about this problem from patient were answered today. Genitourinary    Prostate cancer [facility])     Patient is stable  and will continue present plan of care and reassess at next routine visit. All questions about this problem from patient were answered today. Stage 4 chronic kidney disease [facility])     Lab Results   Component Value Date    EGFR 23 08/05/2022    EGFR 32 11/12/2021    CREATININE 2.57 (H) 08/05/2022    CREATININE 2.27 (H) 11/12/2021   Pt to avoid NSAIDs and any IV dyes. Patient to follow up eoither with nephrology or  with us for  further  monitoring of  renal function. Other    Hyperlipidemia     Patient  is stable with current medication and we discussed a low fat low cholesterol diet. Weight loss also discussed for this will help lower cholesterol also. Recheck lipids in 6 months.          Obesity, morbid ([address])     Patient to increase exercise and partake of a diet with less calories to promote  weight loss         Recurrent major depressive disorder, in remission [facility])     Patient to continue utilizing medical therapy as well and counseling sources as applicable for condition. If  suicidal thought or fear of suicide to contact 911 and seek help immediately. Meds reviewed and patient questions answered today        Other Visit Diagnoses     Well adult exam    -  Primary    Type 2 diabetes mellitus with diabetic neuropathy, without long-term current use of insulin ([address])        Relevant Orders    Hemoglobin A1C    Comprehensive metabolic panel    POCT hemoglobin A1c    Immunization due        Relevant Orders    influenza vaccine, high-dose, PF 0.7 mL (FLUZONE HIGH-DOSE)           Preventive health issues were discussed with patient, and age appropriate screening tests were ordered as noted in patient's After Visit Summary. Personalized health advice and appropriate referrals for health education or preventive services given if needed, as noted in patient's After Visit Summary.      History of Present Illness:     Patient presents for a Medicare Wellness Visit    HPI   Patient Care Team:  Romulo Chau MD as PCP - General (Family Medicine)  Romulo Chau MD as PCP - [address] [place], [place] [address] ([facility])  Romulo Chau MD as PCP - PCP-[facility] ([facility])     Review of Systems:     Review of Systems     Problem List:     Patient Active Problem List   Diagnosis   • Prostate cancer [facility])   • Depression   • Essential hypertension   • Low back pain   • Type 2 diabetes mellitus without complication, with long-term current use of insulin ([address])   • Hyperlipidemia   • Obesity, morbid ([address])   • Primary hyperaldosteronism ([address])   • Stage 4 chronic kidney disease ([address])   • Bronchospasm   • Chronic obstructive pulmonary disease, unspecified COPD type ([address])   • Cardiomyopathy, unspecified type ([address])   • Malignant neoplasm of lower lobe of left lung ([address])   • Recurrent major depressive disorder, in remission [facility])   • Dilatation of thoracic aorta [facility])      Past Medical and Surgical History:     Past Medical History:   Diagnosis Date   • Cancer [facility])     PROSTATE CANCER   • Colon polyp    • Hyperlipidemia    • PTSD (post-traumatic stress disorder)    • Type 2 diabetes mellitus without complication, with long-term current use of insulin ([address]) 2020   • Type 2 diabetes mellitus without complication, with long-term current use of insulin ([address]) 2020     Past Surgical History:   Procedure Laterality Date   • APPENDECTOMY        Family History:     Family History   Problem Relation Age of Onset   • Diabetes Mother    • Hypertension Mother    • Multiple sclerosis Sister    • Parkinsonism Sister       Social History:     Social History     Socioeconomic History   • Marital status: /Civil Union     Spouse name: None   • Number of children: None   • Years of education: None   • Highest education level: None   Occupational History   • None   Tobacco Use   • Smoking status: Never   • Smokeless tobacco: Never   Vaping Use   • Vaping Use: Never used   Substance and Sexual Activity   • Alcohol use: Not Currently   • Drug use: Never   • Sexual activity: None   Other Topics Concern   • None   Social History Narrative    Most recent tobacco use screenin-      Do you currently or have you served in the [address] [place] [address]:   Yes      If Yes, What branch of service:   [address] [place] [address]      Were you activated, into active duty, as a member of the [employer] or as a Reservist:   No      Marital status:         Exercise level:    Moderate      Diet:   Regular      General stress level:   High      Alcohol intake:   None      Caffeine intake:   None      Seat belts used routinely:   Yes      Sunscreen used routinely:   Yes      Smoke alarm in home:   Yes      Advance directive:   Yes      Social Determinants of Health     Financial Resource Strain: High Risk (9/15/2023)    Overall Financial Resource Strain (CARDIA)    • Difficulty of Paying Living Expenses: Hard   Food Insecurity: Not on file   Transportation Needs: No Transportation Needs (9/15/2023)    PRAPARE - Transportation    • Lack of Transportation (Medical): No    • Lack of Transportation (Non-Medical): No   Physical Activity: Not on file   Stress: Not on file   Social Connections: Not on file   Intimate Partner Violence: Not on file   Housing Stability: Not on file      Medications and Allergies:     Current Outpatient Medications   Medication Sig Dispense Refill   • amLODIPine (NORVASC) 10 mg tablet TAKE 1 TABLET BY MOUTH EVERY DAY 90 tablet 1   • Artificial Tear Solution (Just Tears Eye Drops) SOLN INSTILL 1 DROP BOTH EYES TWICE A DAY OR AS NEEDED FOR DRY EYE     • aspirin 325 mg tablet Take 325 mg by mouth daily     • atorvastatin (LIPITOR) 40 mg tablet TAKE 20MG (ONE-HALF TABLET) BY MOUTH EVERY DAY AT 5 PM FOR CHOLESTEROL     • carboxymethylcellulose (REFRESH PLUS) 0.5 % SOLN INSTILL 1 DROP BOTH EYES THREE TIMES A DAY AS NEEDED FOR DRY EYES     • cholecalciferol (VITAMIN D3) 400 units tablet Take 400 Units by mouth daily     • clonazePAM (KlonoPIN) 0.5 MG disintegrating tablet Take 1 tablet (0.5 mg total) by mouth 2 (two) times a day 30 tablet 0   • cloNIDine (CATAPRES) 0.1 mg tablet TAKE 1 TABLET BY MOUTH TWICE A  tablet 1   • Continuous Blood Gluc Sensor (FreeStyle Hugo 2 Sensor) MISC Check blood sugars multiple times per day 2 each 5   • Diclofenac Sodium (VOLTAREN) 1 % APPLY 2GM TO UPPER EXTREMITIES TOPICALLY TWICE A DAY **USE DOSING CARD** (DO NOT EXCEED 16 GRAMS DAILY TO ANY AFFECTED JOINT OF THE LOWER EXTREMITIES. DO NOT EXCEED 8 GRAMS DAILY TO ANY AFFECTED JOINT OF THE UPPER EXTREMITIES.  DO NOT EXCEED A TOTAL DOSE OF 32 GRAMS DAILY OVER ALL JOINTS)     • gabapentin (Neurontin) 300 mg capsule Take 1 capsule (300 mg total) by mouth 3 (three) times a day 270 capsule 3   • glucose blood test strip 1 each by Other route daily as needed Use as instructed     • insulin glargine (LANTUS) 100 units/mL subcutaneous injection Inject 24 Units under the skin daily at bedtime     • ipratropium-albuterol (DUO-NEB) 0.5-2.5 mg/3 mL nebulizer solution INHALE 1 VIAL IN NEBULIZER TWICE A DAY     • ketotifen (ZADITOR) 0.025 % ophthalmic solution INSTILL 1 DROP BOTH EYES TWICE A DAY FOR ALLERGY     • lidocaine (LIDODERM) 5 % APPLY UP TO 3 PATCHES TOPICALLY EVERY DAY FOR PAIN (REMOVE PATCH AFTER 12 HOURS; 12 HOURS ON AND 12 HOURS OFF)     • methocarbamol (ROBAXIN) 500 mg tablet Take 1 tablet (500 mg total) by mouth 3 (three) times a day 20 tablet 0   • multivitamin (THERAGRAN) TABS Take 1 tablet by mouth daily     • oxyCODONE (ROXICODONE) 10 MG TABS Take 1 tablet (10 mg total) by mouth every 6 (six) hours as needed for moderate pain Max Daily Amount: 40 mg 40 tablet 0   • PARoxetine (PAXIL) 30 mg tablet TAKE 1 TABLET BY MOUTH EVERY MORNING 90 tablet 1   • spironolactone (ALDACTONE) 25 mg tablet TAKE ONE TABLET BY MOUTH DAILY FOR HEART/BLOOD PRESSURE     • Symbicort 160-4.5 MCG/ACT inhaler INHALE 2 PUFFS TWICE A DAY 10.2 g 2   • traZODone (DESYREL) 50 mg tablet TAKE 1 TABLET BY MOUTH EVERYDAY AT BEDTIME 90 tablet 1   • meclizine (ANTIVERT) 12.5 MG tablet Take 1 tablet (12.5 mg total) by mouth 3 (three) times a day as needed for dizziness (Patient not taking: Reported on 9/15/2023) 30 tablet 2   • ofloxacin (OCUFLOX) 0.3 % ophthalmic solution PLEASE SEE ATTACHED FOR DETAILED DIRECTIONS (Patient not taking: Reported on 9/15/2023)     • predniSONE 50 mg tablet Take 1 tablet (50 mg total) by mouth daily (Patient not taking: Reported on 9/15/2023) 5 tablet 0     No current facility-administered medications for this visit.      Allergies   Allergen Reactions   • Lisinopril Anaphylaxis   • Metoprolol Bradycardia      Immunizations:     Immunization History   Administered Date(s) Administered   • COVID-19 MODERNA VACC 0.5 ML IM 07/02/2021, 07/30/2021, 01/25/2022   • COVID-19, unspecified 04/28/2021   • DT (pediatric) 05/12/1998   • INFLUENZA 11/06/2020, 11/01/2021, 10/15/2022   • Influenza, high dose seasonal 0.7 mL 11/16/2020, 11/12/2021, 10/24/2022   • Pneumococcal 06/23/2009   • Pneumococcal Conjugate 13-Valent 03/19/2018   • Pneumococcal Polysaccharide PPV23 11/02/2018   • Tdap 11/02/2018   • Tetanus Toxoid, Unspecified 09/01/2007      Health Maintenance:         Topic Date Due   • Colorectal Cancer Screening  01/09/2025   • Hepatitis C Screening  Completed         Topic Date Due   • COVID-19 Vaccine (5 - Mixed Product series) 03/22/2022   • Influenza Vaccine (1) 09/01/2023      Medicare Screening Tests and Risk Assessments:     Adam is here for his Subsequent Wellness visit. Health Risk Assessment:   Patient rates overall health as good. Patient feels that their physical health rating is slightly better. Patient is satisfied with their life. Eyesight was rated as same. Hearing was rated as same. Patient feels that their emotional and mental health rating is same. Patients states they are never, rarely angry. Patient states they are always unusually tired/fatigued. Pain experienced in the last 7 days has been some. Patient's pain rating has been 8/10. Patient states that he has experienced no weight loss or gain in last 6 months. Fall Risk Screening: In the past year, patient has experienced: no history of falling in past year      Home Safety:  Patient has trouble with stairs inside or outside of their home. Patient has working smoke alarms and has working carbon monoxide detector. Home safety hazards include: none. Medications:   Patient is currently taking over-the-counter supplements. OTC medications include: see medication list. Patient is able to manage medications. Activities of Daily Living (ADLs)/Instrumental Activities of Daily Living (IADLs):   Walk and transfer into and out of bed and chair?: Yes  Dress and groom yourself?: Yes    Bathe or shower yourself?: Yes    Feed yourself?  Yes  Do your laundry/housekeeping?: Yes  Manage your money, pay your bills and track your expenses?: Yes  Make your own meals?: Yes    Do your own shopping?: Yes    Previous Hospitalizations:   Any hospitalizations or ED visits within the last 12 months?: No      Advance Care Planning:   Living will: No    Durable POA for healthcare: No      PREVENTIVE SCREENINGS      Cardiovascular Screening:    General: Screening Not Indicated and History Lipid Disorder      Diabetes Screening:     General: Screening Not Indicated and History Diabetes      Colorectal Cancer Screening:     General: Screening Current      Prostate Cancer Screening:    General: History Prostate Cancer and Screening Not Indicated      Lung Cancer Screening:     General: Screening Not Indicated and History Lung Cancer      Hepatitis C Screening:    General: Screening Current    Review of Current Opioid Use    Opioid Risk Tool (ORT) Interpretation: Complete Opioid Risk Tool (ORT)    No results found.      Physical Exam:     /72 (BP Location: Left arm, Patient Position: Sitting, Cuff Size: Standard)   Pulse 96   Temp 98.2 °F (36.8 °C) (Skin)   Ht 5' 7" (1.702 m)   Wt 107 kg (235 lb)   SpO2 98%   BMI 36.81 kg/m²     Physical Exam     Janell Ma MD

## 2023-09-15 NOTE — PROGRESS NOTES
Name: Chu Georges      :       MRN: [id number]  Encounter Provider: Romulo Chau MD  Encounter Date: 9/15/2023   Encounter department: [address] [place] [address] Avenue     1. Well adult exam    2. Type 2 diabetes mellitus with diabetic neuropathy, without long-term current use of insulin (HCC)  -     Hemoglobin A1C; Future; Expected date: 2023  -     Comprehensive metabolic panel; Future; Expected date: 2023  -     POCT hemoglobin A1c    3. Recurrent major depressive disorder, in remission [facility])  Assessment & Plan:  Patient to continue utilizing medical therapy as well and counseling sources as applicable for condition. If  suicidal thought or fear of suicide to contact 911 and seek help immediately. Meds reviewed and patient questions answered today      4. Malignant neoplasm of lower lobe of left lung [facility])  Assessment & Plan:  Patient is stable  and will continue present plan of care and reassess at next routine visit. All questions about this problem from patient were answered today. 5. Chronic obstructive pulmonary disease, unspecified COPD type ([address])  Assessment & Plan:  Continue with current inhalation therapy to maximize lung function. AVOID ALL TOBACCO USAGE and please obtain flu vaccine  yearly. 6. Cardiomyopathy, unspecified type [facility])  Assessment & Plan:  Patient is stable  and will continue present plan of care and reassess at next routine visit. All questions about this problem from patient were answered today. 7. Stage 4 chronic kidney disease [facility])  Assessment & Plan:  Lab Results   Component Value Date    EGFR 23 2022    EGFR 32 2021    CREATININE 2.57 (H) 2022    CREATININE 2.27 (H) 2021   Pt to avoid NSAIDs and any IV dyes. Patient to follow up eoither with nephrology or  with us for  further  monitoring of  renal function.       8. Mixed hyperlipidemia  Assessment & Plan:  Patient  is stable with current medication and we discussed a low fat low cholesterol diet. Weight loss also discussed for this will help lower cholesterol also. Recheck lipids in 6 months. 9. Essential hypertension  Assessment & Plan:  Patient is stable with current anti-hypertensive medicine and continue to follow a low sodium diet and take current medication. All questions about this condition were answered today. 10. Prostate cancer [facility])  Assessment & Plan:  Patient is stable  and will continue present plan of care and reassess at next routine visit. All questions about this problem from patient were answered today. 11. Obesity, morbid ([address])  Assessment & Plan:  Patient to increase exercise and partake of a diet with less calories to promote  weight loss      12. Immunization due  -     influenza vaccine, high-dose, PF 0.7 mL (FLUZONE HIGH-DOSE)        Depression Screening and Follow-up Plan: Patient assessed for underlying major depression. Brief counseling provided and recommend additional follow-up/re-evaluation next office visit. Patient advised to follow-up with PCP for further management. Falls Plan of Care: balance, strength, and gait training instructions were provided. Home safety education provided.          Subjective     HPI  Review of Systems    Past Medical History:   Diagnosis Date   • Cancer ([address])     PROSTATE CANCER   • Colon polyp    • Hyperlipidemia    • PTSD (post-traumatic stress disorder)    • Type 2 diabetes mellitus without complication, with long-term current use of insulin ([address]) 5/29/2020   • Type 2 diabetes mellitus without complication, with long-term current use of insulin ([address]) 5/29/2020     Past Surgical History:   Procedure Laterality Date   • APPENDECTOMY       Family History   Problem Relation Age of Onset   • Diabetes Mother    • Hypertension Mother    • Multiple sclerosis Sister    • Parkinsonism Sister      Social History     Socioeconomic History   • Marital status: /Civil Union     Spouse name: None   • Number of children: None   • Years of education: None   • Highest education level: None   Occupational History   • None   Tobacco Use   • Smoking status: Never   • Smokeless tobacco: Never   Vaping Use   • Vaping Use: Never used   Substance and Sexual Activity   • Alcohol use: Not Currently   • Drug use: Never   • Sexual activity: None   Other Topics Concern   • None   Social History Narrative    Most recent tobacco use screenin-      Do you currently or have you served in the [address] [place] [address] [place]:   Yes      If Yes, What branch of service:   [address] [place] [address]      Were you activated, into active duty, as a member of the [employer] or as a Reservist:   No      Marital status:         Exercise level: Moderate      Diet:   Regular      General stress level:   High      Alcohol intake:   None      Caffeine intake:   None      Seat belts used routinely:   Yes      Sunscreen used routinely:   Yes      Smoke alarm in home:   Yes      Advance directive:   Yes      Social Determinants of Health     Financial Resource Strain: High Risk (9/15/2023)    Overall Financial Resource Strain (CARDIA)    • Difficulty of Paying Living Expenses: Hard   Food Insecurity: Not on file   Transportation Needs: No Transportation Needs (9/15/2023)    PRAPARE - Transportation    • Lack of Transportation (Medical): No    • Lack of Transportation (Non-Medical):  No   Physical Activity: Not on file   Stress: Not on file   Social Connections: Not on file   Intimate Partner Violence: Not on file   Housing Stability: Not on file     Current Outpatient Medications on File Prior to Visit   Medication Sig   • amLODIPine (NORVASC) 10 mg tablet TAKE 1 TABLET BY MOUTH EVERY DAY   • Artificial Tear Solution (Just Tears Eye Drops) SOLN INSTILL 1 DROP BOTH EYES TWICE A DAY OR AS NEEDED FOR DRY EYE   • aspirin 325 mg tablet Take 325 mg by mouth daily   • atorvastatin (LIPITOR) 40 mg tablet TAKE 20MG (ONE-HALF TABLET) BY MOUTH EVERY DAY AT 5 PM FOR CHOLESTEROL   • carboxymethylcellulose (REFRESH PLUS) 0.5 % SOLN INSTILL 1 DROP BOTH EYES THREE TIMES A DAY AS NEEDED FOR DRY EYES   • cholecalciferol (VITAMIN D3) 400 units tablet Take 400 Units by mouth daily   • clonazePAM (KlonoPIN) 0.5 MG disintegrating tablet Take 1 tablet (0.5 mg total) by mouth 2 (two) times a day   • cloNIDine (CATAPRES) 0.1 mg tablet TAKE 1 TABLET BY MOUTH TWICE A DAY   • Continuous Blood Gluc Sensor (FreeStyle Hugo 2 Sensor) MISC Check blood sugars multiple times per day   • Diclofenac Sodium (VOLTAREN) 1 % APPLY 2GM TO UPPER EXTREMITIES TOPICALLY TWICE A DAY **USE DOSING CARD** (DO NOT EXCEED 16 GRAMS DAILY TO ANY AFFECTED JOINT OF THE LOWER EXTREMITIES. DO NOT EXCEED 8 GRAMS DAILY TO ANY AFFECTED JOINT OF THE UPPER EXTREMITIES.  DO NOT EXCEED A TOTAL DOSE OF 32 GRAMS DAILY OVER ALL JOINTS)   • gabapentin (Neurontin) 300 mg capsule Take 1 capsule (300 mg total) by mouth 3 (three) times a day   • glucose blood test strip 1 each by Other route daily as needed Use as instructed   • insulin glargine (LANTUS) 100 units/mL subcutaneous injection Inject 24 Units under the skin daily at bedtime   • ipratropium-albuterol (DUO-NEB) 0.5-2.5 mg/3 mL nebulizer solution INHALE 1 VIAL IN NEBULIZER TWICE A DAY   • ketotifen (ZADITOR) 0.025 % ophthalmic solution INSTILL 1 DROP BOTH EYES TWICE A DAY FOR ALLERGY   • lidocaine (LIDODERM) 5 % APPLY UP TO 3 PATCHES TOPICALLY EVERY DAY FOR PAIN (REMOVE PATCH AFTER 12 HOURS; 12 HOURS ON AND 12 HOURS OFF)   • methocarbamol (ROBAXIN) 500 mg tablet Take 1 tablet (500 mg total) by mouth 3 (three) times a day   • multivitamin (THERAGRAN) TABS Take 1 tablet by mouth daily   • oxyCODONE (ROXICODONE) 10 MG TABS Take 1 tablet (10 mg total) by mouth every 6 (six) hours as needed for moderate pain Max Daily Amount: 40 mg   • PARoxetine (PAXIL) 30 mg tablet TAKE 1 TABLET BY MOUTH EVERY MORNING   • spironolactone (ALDACTONE) 25 mg tablet TAKE ONE TABLET BY MOUTH DAILY FOR HEART/BLOOD PRESSURE   • Symbicort 160-4.5 MCG/ACT inhaler INHALE 2 PUFFS TWICE A DAY   • traZODone (DESYREL) 50 mg tablet TAKE 1 TABLET BY MOUTH EVERYDAY AT BEDTIME   • meclizine (ANTIVERT) 12.5 MG tablet Take 1 tablet (12.5 mg total) by mouth 3 (three) times a day as needed for dizziness (Patient not taking: Reported on 9/15/2023)   • ofloxacin (OCUFLOX) 0.3 % ophthalmic solution PLEASE SEE ATTACHED FOR DETAILED DIRECTIONS (Patient not taking: Reported on 9/15/2023)   • predniSONE 50 mg tablet Take 1 tablet (50 mg total) by mouth daily (Patient not taking: Reported on 9/15/2023)   • [DISCONTINUED] amoxicillin-clavulanate (AUGMENTIN) 500-125 mg per tablet TAKE 1 TABLET BY MOUTH TWICE A DAY UNTIL ALL PILLS ARE TAKEN (Patient not taking: Reported on 3/15/2023)   • [DISCONTINUED] benzonatate (TESSALON) 200 MG capsule Take 1 capsule (200 mg total) by mouth 3 (three) times a day as needed for cough (Patient not taking: Reported on 6/15/2023)   • [DISCONTINUED] cyclobenzaprine (FLEXERIL) 10 mg tablet Take 1 tablet (10 mg total) by mouth 3 (three) times a day as needed for muscle spasms (Patient not taking: Reported on 9/15/2023)     Allergies   Allergen Reactions   • Lisinopril Anaphylaxis   • Metoprolol Bradycardia     Immunization History   Administered Date(s) Administered   • COVID-19 MODERNA VACC 0.5 ML IM 07/02/2021, 07/30/2021, 01/25/2022   • COVID-19, unspecified 04/28/2021   • DT (pediatric) 05/12/1998   • INFLUENZA 11/06/2020, 11/01/2021, 10/15/2022   • Influenza, high dose seasonal 0.7 mL 11/16/2020, 11/12/2021, 10/24/2022   • Pneumococcal 06/23/2009   • Pneumococcal Conjugate 13-Valent 03/19/2018   • Pneumococcal Polysaccharide PPV23 11/02/2018   • Tdap 11/02/2018   • Tetanus Toxoid, Unspecified 09/01/2007       Objective     /72 (BP Location: Left arm, Patient Position: Sitting, Cuff Size: Standard)   Pulse 96   Temp 98.2 °F (36.8 °C) (Skin)   Ht 5' 7" (1.702 m)   Wt 107 kg (235 lb)   SpO2 98%   BMI 36.81 kg/m²     Physical Exam  Peter Cuevas MD

## 2023-09-15 NOTE — PATIENT INSTRUCTIONS
Medicare Preventive Visit Patient Instructions  Thank you for completing your Welcome to Medicare Visit or Medicare Annual Wellness Visit today. Your next wellness visit will be due in one year (9/15/2024). The screening/preventive services that you may require over the next 5-10 years are detailed below. Some tests may not apply to you based off risk factors and/or age. Screening tests ordered at today's visit but not completed yet may show as past due. Also, please note that scanned in results may not display below. Preventive Screenings:  Service Recommendations Previous Testing/Comments   Colorectal Cancer Screening  · Colonoscopy    · Fecal Occult Blood Test (FOBT)/Fecal Immunochemical Test (FIT)  · Fecal DNA/Cologuard Test  · Flexible Sigmoidoscopy Age: 43-73 years old   Colonoscopy: every 10 years (May be performed more frequently if at higher risk)  OR  FOBT/FIT: every 1 year  OR  Cologuard: every 3 years  OR  Sigmoidoscopy: every 5 years  Screening may be recommended earlier than age 39 if at higher risk for colorectal cancer. Also, an individualized decision between you and your healthcare provider will decide whether screening between the ages of 77-80 would be appropriate.  Colonoscopy: 01/09/2020  FOBT/FIT: Not on file  Cologuard: Not on file  Sigmoidoscopy: Not on file    Screening Current     Prostate Cancer Screening Individualized decision between patient and health care provider in men between ages of 53-66   Medicare will cover every 12 months beginning on the day after your 50th birthday PSA: No results in last 5 years     History Prostate Cancer  Screening Not Indicated     Hepatitis C Screening Once for adults born between 1945 and 1965  More frequently in patients at high risk for Hepatitis C Hep C Antibody: 11/12/2021    Screening Current   Diabetes Screening 1-2 times per year if you're at risk for diabetes or have pre-diabetes Fasting glucose: 123 mg/dL (8/5/2022)  A1C: 6.9 (6/15/2023)  Screening Not Indicated  History Diabetes   Cholesterol Screening Once every 5 years if you don't have a lipid disorder. May order more often based on risk factors. Lipid panel: 08/05/2022  Screening Not Indicated  History Lipid Disorder      Other Preventive Screenings Covered by Medicare:  1. Abdominal Aortic Aneurysm (AAA) Screening: covered once if your at risk. You're considered to be at risk if you have a family history of AAA or a male between the age of 70-76 who smoking at least 100 cigarettes in your lifetime. 2. Lung Cancer Screening: covers low dose CT scan once per year if you meet all of the following conditions: (1) Age 48-67; (2) No signs or symptoms of lung cancer; (3) Current smoker or have quit smoking within the last 15 years; (4) You have a tobacco smoking history of at least 20 pack years (packs per day x number of years you smoked); (5) You get a written order from a healthcare provider. 3. Glaucoma Screening: covered annually if you're considered high risk: (1) You have diabetes OR (2) Family history of glaucoma OR (3)  aged 48 and older OR (3)  American aged 72 and older  3. Osteoporosis Screening: covered every 2 years if you meet one of the following conditions: (1) Have a vertebral abnormality; (2) On glucocorticoid therapy for more than 3 months; (3) Have primary hyperparathyroidism; (4) On osteoporosis medications and need to assess response to drug therapy. 5. HIV Screening: covered annually if you're between the age of 14-79. Also covered annually if you are younger than 13 and older than 72 with risk factors for HIV infection. For pregnant patients, it is covered up to 3 times per pregnancy.     Immunizations:  Immunization Recommendations   Influenza Vaccine Annual influenza vaccination during flu season is recommended for all persons aged >= 6 months who do not have contraindications   Pneumococcal Vaccine   * Pneumococcal conjugate vaccine = PCV13 (Prevnar 13), PCV15 (Vaxneuvance), PCV20 (Prevnar 20)  * Pneumococcal polysaccharide vaccine = PPSV23 (Pneumovax) Adults 2364 years old: 1-3 doses may be recommended based on certain risk factors  Adults 72 years old: 1-2 doses may be recommended based off what pneumonia vaccine you previously received   Hepatitis B Vaccine 3 dose series if at intermediate or high risk (ex: diabetes, end stage renal disease, liver disease)   Tetanus (Td) Vaccine - COST NOT COVERED BY MEDICARE PART B Following completion of primary series, a booster dose should be given every 10 years to maintain immunity against tetanus. Td may also be given as tetanus wound prophylaxis. Tdap Vaccine - COST NOT COVERED BY MEDICARE PART B Recommended at least once for all adults. For pregnant patients, recommended with each pregnancy. Shingles Vaccine (Shingrix) - COST NOT COVERED BY MEDICARE PART B  2 shot series recommended in those aged 48 and above     Health Maintenance Due:      Topic Date Due   • Colorectal Cancer Screening  01/09/2025   • Hepatitis C Screening  Completed     Immunizations Due:      Topic Date Due   • COVID-19 Vaccine (5 - Mixed Product series) 03/22/2022   • Influenza Vaccine (1) 09/01/2023     Advance Directives   What are advance directives? Advance directives are legal documents that state your wishes and plans for medical care. These plans are made ahead of time in case you lose your ability to make decisions for yourself. Advance directives can apply to any medical decision, such as the treatments you want, and if you want to donate organs. What are the types of advance directives? There are many types of advance directives, and each state has rules about how to use them. You may choose a combination of any of the following:  · Living will: This is a written record of the treatment you want. You can also choose which treatments you do not want, which to limit, and which to stop at a certain time.  This includes surgery, medicine, IV fluid, and tube feedings. · Durable power of  for healthcare [facility]): This is a written record that states who you want to make healthcare choices for you when you are unable to make them for yourself. This person, called a proxy, is usually a family member or a friend. You may choose more than 1 proxy. · Do not resuscitate (DNR) order:  A DNR order is used in case your heart stops beating or you stop breathing. It is a request not to have certain forms of treatment, such as CPR. A DNR order may be included in other types of advance directives. · Medical directive: This covers the care that you want if you are in a coma, near death, or unable to make decisions for yourself. You can list the treatments you want for each condition. Treatment may include pain medicine, surgery, blood transfusions, dialysis, IV or tube feedings, and a ventilator (breathing machine). · Values history: This document has questions about your views, beliefs, and how you feel and think about life. This information can help others choose the care that you would choose. Why are advance directives important? An advance directive helps you control your care. Although spoken wishes may be used, it is better to have your wishes written down. Spoken wishes can be misunderstood, or not followed. Treatments may be given even if you do not want them. An advance directive may make it easier for your family to make difficult choices about your care. Weight Management   Why it is important to manage your weight:  Being overweight increases your risk of health conditions such as heart disease, high blood pressure, type 2 diabetes, and certain types of cancer. It can also increase your risk for osteoarthritis, sleep apnea, and other respiratory problems. Aim for a slow, steady weight loss. Even a small amount of weight loss can lower your risk of health problems.   How to lose weight safely:  A safe and healthy way to lose weight is to eat fewer calories and get regular exercise. You can lose up about 1 pound a week by decreasing the number of calories you eat by 500 calories each day. Healthy meal plan for weight management:  A healthy meal plan includes a variety of foods, contains fewer calories, and helps you stay healthy. A healthy meal plan includes the following:  · Eat whole-grain foods more often. A healthy meal plan should contain fiber. Fiber is the part of grains, fruits, and vegetables that is not broken down by your body. Whole-grain foods are healthy and provide extra fiber in your diet. Some examples of whole-grain foods are whole-wheat breads and pastas, oatmeal, brown rice, and bulgur. · Eat a variety of vegetables every day. Include dark, leafy greens such as spinach, kale, siva greens, and mustard greens. Eat yellow and orange vegetables such as carrots, sweet potatoes, and winter squash. · Eat a variety of fruits every day. Choose fresh or canned fruit (canned in its own juice or light syrup) instead of juice. Fruit juice has very little or no fiber. · Eat low-fat dairy foods. Drink fat-free (skim) milk or 1% milk. Eat fat-free yogurt and low-fat cottage cheese. Try low-fat cheeses such as mozzarella and other reduced-fat cheeses. · Choose meat and other protein foods that are low in fat. Choose beans or other legumes such as split peas or lentils. Choose fish, skinless poultry (chicken or turkey), or lean cuts of red meat (beef or pork). Before you cook meat or poultry, cut off any visible fat. · Use less fat and oil. Try baking foods instead of frying them. Add less fat, such as margarine, sour cream, regular salad dressing and mayonnaise to foods. Eat fewer high-fat foods. Some examples of high-fat foods include french fries, doughnuts, ice cream, and cakes. · Eat fewer sweets. Limit foods and drinks that are high in sugar.  This includes candy, cookies, regular soda, and sweetened drinks. Exercise:  Exercise at least 30 minutes per day on most days of the week. Some examples of exercise include walking, biking, dancing, and swimming. You can also fit in more physical activity by taking the stairs instead of the elevator or parking farther away from stores. Ask your healthcare provider about the best exercise plan for you. Narcotic (Opioid) Safety    Use narcotics safely:  · Take prescribed narcotics exactly as directed  · Do not give narcotics to others or take narcotics that belong to someone else  · Do not mix narcotics without medicines or alcohol  · Do not drive or operate heavy machinery after you take the narcotic  · Monitor for side effects and notify your healthcare provider if you experienced side effects such as nausea, sleepiness, itching, or trouble thinking clearly. Manage constipation:    Constipation is the most common side effect of narcotic medicine. Constipation is when you have hard, dry bowel movements, or you go longer than usual between bowel movements. Tell your healthcare provider about all changes in your bowel movements while you are taking narcotics. He or she may recommend laxative medicine to help you have a bowel movement. He or she may also change the kind of narcotic you are taking, or change when you take it. The following are more ways you can prevent or relieve constipation:    · Drink liquids as directed. You may need to drink extra liquids to help soften and move your bowels. Ask how much liquid to drink each day and which liquids are best for you. · Eat high-fiber foods. This may help decrease constipation by adding bulk to your bowel movements. High-fiber foods include fruits, vegetables, whole-grain breads and cereals, and beans. Your healthcare provider or dietitian can help you create a high-fiber meal plan. Your provider may also recommend a fiber supplement if you cannot get enough fiber from food. · Exercise regularly.   Regular physical activity can help stimulate your intestines. Walking is a good exercise to prevent or relieve constipation. Ask which exercises are best for you. · Schedule a time each day to have a bowel movement. This may help train your body to have regular bowel movements. Bend forward while you are on the toilet to help move the bowel movement out. Sit on the toilet for at least 10 minutes, even if you do not have a bowel movement. Store narcotics safely:   · Store narcotics where others cannot easily get them. Keep them in a locked cabinet or secure area. Do not  keep them in a purse or other bag you carry with you. A person may be looking for something else and find the narcotics. · Make sure narcotics are stored out of the reach of children. A child can easily overdose on narcotics. Narcotics may look like candy to a small child. The best way to dispose of narcotics: The laws vary by country and area. In the [facility], the best way is to return the narcotics through a take-back program. This program is offered by the [employer]). The following are options for using the program:  · Take the narcotics to a GENE collection site. The site is often a law enforcement center. Call your local law enforcement center for scheduled take-back days in your area. You will be given information on where to go if the collection site is in a different location. · Take the narcotics to an approved pharmacy or hospital.  A pharmacy or hospital may be set up as a collection site. You will need to ask if it is a GENE collection site if you were not directed there. A pharmacy or doctor's office may not be able to take back narcotics unless it is a GENE site. · Use a mail-back system. This means you are given containers to put the narcotics into. You will then mail them in the containers. · Use a take-back drop box. This is a place to leave the narcotics at any time.  People and animals will not be able to get into the box. Your local law enforcement agency can tell you where to find a drop box in your area. Other ways to manage pain:   · Ask your healthcare provider about non-narcotic medicines to control pain. Nonprescription medicines include NSAIDs (such as ibuprofen) and acetaminophen. Prescription medicines include muscle relaxers, antidepressants, and steroids. · Pain may be managed without any medicines. Some ways to relieve pain include massage, aromatherapy, or meditation. Physical or occupational therapy may also help. For more information:   · Drug Enforcement Administration  [address] [facility] , [address] [facility]  Phone: 2- 394 - 963-6983  Web Address: [facility] [employer].gov/drug_disposal/    · [employer] and Drug Administration  [address]  [facility] , [address] [facility] [address]  Phone: 1- 359 - 610-5475  Web Address: http://tidy/     © Copyright [employer] 2018 Information is for End User's use only and may not be sold, redistributed or otherwise used for commercial purposes.  All illustrations and images included in CareNotes® are the copyrighted property of A.D.A.M., Inc. or [address] [place] [address]

## 2023-09-15 NOTE — ASSESSMENT & PLAN NOTE
Lab Results   Component Value Date    EGFR 23 08/05/2022    EGFR 32 11/12/2021    CREATININE 2.57 (H) 08/05/2022    CREATININE 2.27 (H) 11/12/2021   Pt to avoid NSAIDs and any IV dyes. Patient to follow up eoither with nephrology or  with us for  further  monitoring of  renal function.

## 2023-09-21 ENCOUNTER — TELEPHONE (OUTPATIENT)
Age: 78
End: 2023-09-21

## 2023-09-21 DIAGNOSIS — I42.9 CARDIOMYOPATHY, UNSPECIFIED TYPE (HCC): Primary | ICD-10-CM

## 2023-09-21 RX ORDER — SPIRONOLACTONE 25 MG/1
25 TABLET ORAL DAILY
Qty: 90 TABLET | Refills: 1 | Status: CANCELLED | OUTPATIENT
Start: 2023-09-21

## 2023-09-21 RX ORDER — SPIRONOLACTONE 25 MG/1
25 TABLET ORAL DAILY
Qty: 90 TABLET | Refills: 1 | Status: SHIPPED | OUTPATIENT
Start: 2023-09-21

## 2023-09-21 NOTE — TELEPHONE ENCOUNTER
Wife calling, patient needs new script for spironolactone called to [facility] in [facility], [place]. Previously getting scripts filled by [place].  Please f/u with patients wife Dipesh

## 2023-09-21 NOTE — TELEPHONE ENCOUNTER
Patient states he was getting this med filled at the [place], but was only getting a 60 day supply when he should be getting a 80 Day supply

## 2023-10-11 ENCOUNTER — APPOINTMENT (EMERGENCY)
Dept: RADIOLOGY | Facility: HOSPITAL | Age: 78
DRG: 191 | End: 2023-10-11
Payer: COMMERCIAL

## 2023-10-11 ENCOUNTER — APPOINTMENT (EMERGENCY)
Dept: CT IMAGING | Facility: HOSPITAL | Age: 78
DRG: 191 | End: 2023-10-11
Payer: COMMERCIAL

## 2023-10-11 ENCOUNTER — HOSPITAL ENCOUNTER (INPATIENT)
Facility: HOSPITAL | Age: 78
LOS: 6 days | Discharge: HOME HEALTH | DRG: 191 | End: 2023-10-18
Attending: EMERGENCY MEDICINE | Admitting: INTERNAL MEDICINE
Payer: COMMERCIAL

## 2023-10-11 DIAGNOSIS — R06.02 SOB (SHORTNESS OF BREATH): Primary | ICD-10-CM

## 2023-10-11 PROBLEM — J96.11 CHRONIC RESPIRATORY FAILURE WITH HYPOXIA (HCC): Status: ACTIVE | Noted: 2023-10-11

## 2023-10-11 LAB
2HR DELTA HS TROPONIN: 0 NG/L
4HR DELTA HS TROPONIN: -1 NG/L
ALBUMIN SERPL BCP-MCNC: 4.4 G/DL (ref 3.5–5)
ALP SERPL-CCNC: 69 U/L (ref 34–104)
ALT SERPL W P-5'-P-CCNC: 15 U/L (ref 7–52)
ANION GAP SERPL CALCULATED.3IONS-SCNC: 9 MMOL/L
APTT PPP: 38 SECONDS (ref 23–37)
AST SERPL W P-5'-P-CCNC: 14 U/L (ref 13–39)
BASOPHILS # BLD AUTO: 0.03 THOUSANDS/ÂΜL (ref 0–0.1)
BASOPHILS NFR BLD AUTO: 1 % (ref 0–1)
BILIRUB SERPL-MCNC: 0.7 MG/DL (ref 0.2–1)
BNP SERPL-MCNC: 48 PG/ML (ref 0–100)
BUN SERPL-MCNC: 37 MG/DL (ref 5–25)
CALCIUM SERPL-MCNC: 9.7 MG/DL (ref 8.4–10.2)
CARDIAC TROPONIN I PNL SERPL HS: 4 NG/L
CARDIAC TROPONIN I PNL SERPL HS: 5 NG/L
CARDIAC TROPONIN I PNL SERPL HS: 5 NG/L
CHLORIDE SERPL-SCNC: 107 MMOL/L (ref 96–108)
CO2 SERPL-SCNC: 23 MMOL/L (ref 21–32)
CREAT SERPL-MCNC: 2.68 MG/DL (ref 0.6–1.3)
EOSINOPHIL # BLD AUTO: 0.33 THOUSAND/ÂΜL (ref 0–0.61)
EOSINOPHIL NFR BLD AUTO: 5 % (ref 0–6)
ERYTHROCYTE [DISTWIDTH] IN BLOOD BY AUTOMATED COUNT: 14.9 % (ref 11.6–15.1)
FLUAV RNA RESP QL NAA+PROBE: NEGATIVE
FLUBV RNA RESP QL NAA+PROBE: NEGATIVE
GFR SERPL CREATININE-BSD FRML MDRD: 22 ML/MIN/1.73SQ M
GLUCOSE SERPL-MCNC: 134 MG/DL (ref 65–140)
GLUCOSE SERPL-MCNC: 84 MG/DL (ref 65–140)
HCT VFR BLD AUTO: 33.6 % (ref 36.5–49.3)
HGB BLD-MCNC: 10.5 G/DL (ref 12–17)
IMM GRANULOCYTES # BLD AUTO: 0.04 THOUSAND/UL (ref 0–0.2)
IMM GRANULOCYTES NFR BLD AUTO: 1 % (ref 0–2)
INR PPP: 1.11 (ref 0.84–1.19)
LYMPHOCYTES # BLD AUTO: 1.06 THOUSANDS/ÂΜL (ref 0.6–4.47)
LYMPHOCYTES NFR BLD AUTO: 16 % (ref 14–44)
MAGNESIUM SERPL-MCNC: 1.9 MG/DL (ref 1.9–2.7)
MCH RBC QN AUTO: 27.7 PG (ref 26.8–34.3)
MCHC RBC AUTO-ENTMCNC: 31.3 G/DL (ref 31.4–37.4)
MCV RBC AUTO: 89 FL (ref 82–98)
MONOCYTES # BLD AUTO: 0.52 THOUSAND/ÂΜL (ref 0.17–1.22)
MONOCYTES NFR BLD AUTO: 8 % (ref 4–12)
NEUTROPHILS # BLD AUTO: 4.57 THOUSANDS/ÂΜL (ref 1.85–7.62)
NEUTS SEG NFR BLD AUTO: 69 % (ref 43–75)
NRBC BLD AUTO-RTO: 1 /100 WBCS
PLATELET # BLD AUTO: 207 THOUSANDS/UL (ref 149–390)
PMV BLD AUTO: 10.6 FL (ref 8.9–12.7)
POTASSIUM SERPL-SCNC: 4.4 MMOL/L (ref 3.5–5.3)
PROCALCITONIN SERPL-MCNC: 0.13 NG/ML
PROT SERPL-MCNC: 7.9 G/DL (ref 6.4–8.4)
PROTHROMBIN TIME: 15 SECONDS (ref 11.6–14.5)
RBC # BLD AUTO: 3.79 MILLION/UL (ref 3.88–5.62)
RSV RNA RESP QL NAA+PROBE: NEGATIVE
SARS-COV-2 RNA RESP QL NAA+PROBE: NEGATIVE
SODIUM SERPL-SCNC: 139 MMOL/L (ref 135–147)
TSH SERPL DL<=0.05 MIU/L-ACNC: 0.73 UIU/ML (ref 0.45–4.5)
WBC # BLD AUTO: 6.55 THOUSAND/UL (ref 4.31–10.16)

## 2023-10-11 PROCEDURE — 84484 ASSAY OF TROPONIN QUANT: CPT | Performed by: EMERGENCY MEDICINE

## 2023-10-11 PROCEDURE — 99285 EMERGENCY DEPT VISIT HI MDM: CPT

## 2023-10-11 PROCEDURE — 96365 THER/PROPH/DIAG IV INF INIT: CPT

## 2023-10-11 PROCEDURE — 80053 COMPREHEN METABOLIC PANEL: CPT | Performed by: EMERGENCY MEDICINE

## 2023-10-11 PROCEDURE — 71250 CT THORAX DX C-: CPT

## 2023-10-11 PROCEDURE — 93005 ELECTROCARDIOGRAM TRACING: CPT

## 2023-10-11 PROCEDURE — 82948 REAGENT STRIP/BLOOD GLUCOSE: CPT

## 2023-10-11 PROCEDURE — 71045 X-RAY EXAM CHEST 1 VIEW: CPT

## 2023-10-11 PROCEDURE — G1004 CDSM NDSC: HCPCS

## 2023-10-11 PROCEDURE — 94640 AIRWAY INHALATION TREATMENT: CPT

## 2023-10-11 PROCEDURE — 85610 PROTHROMBIN TIME: CPT

## 2023-10-11 PROCEDURE — 0241U HB NFCT DS VIR RESP RNA 4 TRGT: CPT | Performed by: EMERGENCY MEDICINE

## 2023-10-11 PROCEDURE — 84145 PROCALCITONIN (PCT): CPT | Performed by: INTERNAL MEDICINE

## 2023-10-11 PROCEDURE — 84443 ASSAY THYROID STIM HORMONE: CPT | Performed by: INTERNAL MEDICINE

## 2023-10-11 PROCEDURE — 85025 COMPLETE CBC W/AUTO DIFF WBC: CPT | Performed by: EMERGENCY MEDICINE

## 2023-10-11 PROCEDURE — 99222 1ST HOSP IP/OBS MODERATE 55: CPT | Performed by: INTERNAL MEDICINE

## 2023-10-11 PROCEDURE — 85730 THROMBOPLASTIN TIME PARTIAL: CPT

## 2023-10-11 PROCEDURE — 83735 ASSAY OF MAGNESIUM: CPT

## 2023-10-11 PROCEDURE — 36415 COLL VENOUS BLD VENIPUNCTURE: CPT

## 2023-10-11 PROCEDURE — 83880 ASSAY OF NATRIURETIC PEPTIDE: CPT

## 2023-10-11 RX ORDER — SPIRONOLACTONE 25 MG/1
25 TABLET ORAL DAILY
Status: DISCONTINUED | OUTPATIENT
Start: 2023-10-12 | End: 2023-10-18 | Stop reason: HOSPADM

## 2023-10-11 RX ORDER — IPRATROPIUM BROMIDE AND ALBUTEROL SULFATE 2.5; .5 MG/3ML; MG/3ML
3 SOLUTION RESPIRATORY (INHALATION) ONCE
Status: COMPLETED | OUTPATIENT
Start: 2023-10-11 | End: 2023-10-11

## 2023-10-11 RX ORDER — CLONIDINE HYDROCHLORIDE 0.1 MG/1
0.1 TABLET ORAL 2 TIMES DAILY
Status: CANCELLED | OUTPATIENT
Start: 2023-10-11

## 2023-10-11 RX ORDER — ACETAMINOPHEN 325 MG/1
650 TABLET ORAL EVERY 6 HOURS PRN
Status: DISCONTINUED | OUTPATIENT
Start: 2023-10-11 | End: 2023-10-18 | Stop reason: HOSPADM

## 2023-10-11 RX ORDER — INSULIN GLARGINE 100 [IU]/ML
40 INJECTION, SOLUTION SUBCUTANEOUS
Status: CANCELLED | OUTPATIENT
Start: 2023-10-11

## 2023-10-11 RX ORDER — TRAZODONE HYDROCHLORIDE 50 MG/1
50 TABLET ORAL
Status: CANCELLED | OUTPATIENT
Start: 2023-10-11

## 2023-10-11 RX ORDER — LATANOPROST 50 UG/ML
1 SOLUTION/ DROPS OPHTHALMIC
COMMUNITY

## 2023-10-11 RX ORDER — BRIMONIDINE TARTRATE 2 MG/ML
1 SOLUTION/ DROPS OPHTHALMIC 3 TIMES DAILY
Status: DISCONTINUED | OUTPATIENT
Start: 2023-10-11 | End: 2023-10-18 | Stop reason: HOSPADM

## 2023-10-11 RX ORDER — SPIRONOLACTONE 25 MG/1
25 TABLET ORAL DAILY
Status: CANCELLED | OUTPATIENT
Start: 2023-10-12

## 2023-10-11 RX ORDER — INSULIN GLARGINE 100 [IU]/ML
24 INJECTION, SOLUTION SUBCUTANEOUS
Status: DISCONTINUED | OUTPATIENT
Start: 2023-10-11 | End: 2023-10-12

## 2023-10-11 RX ORDER — OXYCODONE HYDROCHLORIDE 10 MG/1
10 TABLET ORAL EVERY 6 HOURS PRN
Status: CANCELLED | OUTPATIENT
Start: 2023-10-11

## 2023-10-11 RX ORDER — AMLODIPINE BESYLATE 5 MG/1
10 TABLET ORAL DAILY
Status: CANCELLED | OUTPATIENT
Start: 2023-10-12

## 2023-10-11 RX ORDER — HEPARIN SODIUM 1000 [USP'U]/ML
8400 INJECTION, SOLUTION INTRAVENOUS; SUBCUTANEOUS EVERY 6 HOURS PRN
Status: DISCONTINUED | OUTPATIENT
Start: 2023-10-11 | End: 2023-10-11

## 2023-10-11 RX ORDER — HEPARIN SODIUM 10000 [USP'U]/100ML
3-30 INJECTION, SOLUTION INTRAVENOUS
Status: DISCONTINUED | OUTPATIENT
Start: 2023-10-11 | End: 2023-10-11

## 2023-10-11 RX ORDER — HEPARIN SODIUM 1000 [USP'U]/ML
8400 INJECTION, SOLUTION INTRAVENOUS; SUBCUTANEOUS ONCE
Status: COMPLETED | OUTPATIENT
Start: 2023-10-11 | End: 2023-10-11

## 2023-10-11 RX ORDER — TRAZODONE HYDROCHLORIDE 50 MG/1
50 TABLET ORAL
Status: DISCONTINUED | OUTPATIENT
Start: 2023-10-11 | End: 2023-10-18 | Stop reason: HOSPADM

## 2023-10-11 RX ORDER — ATORVASTATIN CALCIUM 20 MG/1
20 TABLET, FILM COATED ORAL
Status: DISCONTINUED | OUTPATIENT
Start: 2023-10-12 | End: 2023-10-18 | Stop reason: HOSPADM

## 2023-10-11 RX ORDER — INSULIN GLARGINE 100 [IU]/ML
24 INJECTION, SOLUTION SUBCUTANEOUS
Status: DISCONTINUED | OUTPATIENT
Start: 2023-10-12 | End: 2023-10-11

## 2023-10-11 RX ORDER — PREDNISOLONE ACETATE 10 MG/ML
1 SUSPENSION/ DROPS OPHTHALMIC
Status: DISCONTINUED | OUTPATIENT
Start: 2023-10-11 | End: 2023-10-18 | Stop reason: HOSPADM

## 2023-10-11 RX ORDER — PAROXETINE HYDROCHLORIDE 20 MG/1
30 TABLET, FILM COATED ORAL EVERY MORNING
Status: CANCELLED | OUTPATIENT
Start: 2023-10-12

## 2023-10-11 RX ORDER — FUROSEMIDE 10 MG/ML
40 INJECTION INTRAMUSCULAR; INTRAVENOUS
Status: DISCONTINUED | OUTPATIENT
Start: 2023-10-11 | End: 2023-10-12

## 2023-10-11 RX ORDER — INSULIN GLARGINE 100 [IU]/ML
40 INJECTION, SOLUTION SUBCUTANEOUS
Status: DISCONTINUED | OUTPATIENT
Start: 2023-10-11 | End: 2023-10-11

## 2023-10-11 RX ORDER — LATANOPROST 50 UG/ML
1 SOLUTION/ DROPS OPHTHALMIC
Status: DISCONTINUED | OUTPATIENT
Start: 2023-10-11 | End: 2023-10-18 | Stop reason: HOSPADM

## 2023-10-11 RX ORDER — CLONIDINE HYDROCHLORIDE 0.1 MG/1
0.1 TABLET ORAL 2 TIMES DAILY
Status: DISCONTINUED | OUTPATIENT
Start: 2023-10-11 | End: 2023-10-18 | Stop reason: HOSPADM

## 2023-10-11 RX ORDER — BUDESONIDE AND FORMOTEROL FUMARATE DIHYDRATE 160; 4.5 UG/1; UG/1
2 AEROSOL RESPIRATORY (INHALATION) 2 TIMES DAILY
Status: CANCELLED | OUTPATIENT
Start: 2023-10-11

## 2023-10-11 RX ORDER — BUDESONIDE AND FORMOTEROL FUMARATE DIHYDRATE 160; 4.5 UG/1; UG/1
2 AEROSOL RESPIRATORY (INHALATION) 2 TIMES DAILY
Status: DISCONTINUED | OUTPATIENT
Start: 2023-10-11 | End: 2023-10-18 | Stop reason: HOSPADM

## 2023-10-11 RX ORDER — INSULIN LISPRO 100 [IU]/ML
1-6 INJECTION, SOLUTION INTRAVENOUS; SUBCUTANEOUS
Status: DISCONTINUED | OUTPATIENT
Start: 2023-10-12 | End: 2023-10-18 | Stop reason: HOSPADM

## 2023-10-11 RX ORDER — CLONAZEPAM 0.5 MG/1
0.5 TABLET ORAL DAILY PRN
Status: DISCONTINUED | OUTPATIENT
Start: 2023-10-11 | End: 2023-10-18 | Stop reason: HOSPADM

## 2023-10-11 RX ORDER — GABAPENTIN 300 MG/1
300 CAPSULE ORAL 2 TIMES DAILY
Status: DISCONTINUED | OUTPATIENT
Start: 2023-10-11 | End: 2023-10-18 | Stop reason: HOSPADM

## 2023-10-11 RX ORDER — HEPARIN SODIUM 1000 [USP'U]/ML
4200 INJECTION, SOLUTION INTRAVENOUS; SUBCUTANEOUS EVERY 6 HOURS PRN
Status: DISCONTINUED | OUTPATIENT
Start: 2023-10-11 | End: 2023-10-11

## 2023-10-11 RX ORDER — HEPARIN SODIUM 5000 [USP'U]/ML
5000 INJECTION, SOLUTION INTRAVENOUS; SUBCUTANEOUS EVERY 8 HOURS SCHEDULED
Status: DISCONTINUED | OUTPATIENT
Start: 2023-10-11 | End: 2023-10-18 | Stop reason: HOSPADM

## 2023-10-11 RX ORDER — ONDANSETRON 2 MG/ML
4 INJECTION INTRAMUSCULAR; INTRAVENOUS EVERY 6 HOURS PRN
Status: DISCONTINUED | OUTPATIENT
Start: 2023-10-11 | End: 2023-10-18 | Stop reason: HOSPADM

## 2023-10-11 RX ORDER — AMLODIPINE BESYLATE 10 MG/1
10 TABLET ORAL DAILY
Status: DISCONTINUED | OUTPATIENT
Start: 2023-10-12 | End: 2023-10-18 | Stop reason: HOSPADM

## 2023-10-11 RX ORDER — PAROXETINE HYDROCHLORIDE 20 MG/1
30 TABLET, FILM COATED ORAL EVERY MORNING
Status: DISCONTINUED | OUTPATIENT
Start: 2023-10-12 | End: 2023-10-18 | Stop reason: HOSPADM

## 2023-10-11 RX ORDER — ATORVASTATIN CALCIUM 40 MG/1
40 TABLET, FILM COATED ORAL
Status: CANCELLED | OUTPATIENT
Start: 2023-10-11

## 2023-10-11 RX ADMIN — TRAZODONE HYDROCHLORIDE 50 MG: 50 TABLET ORAL at 22:54

## 2023-10-11 RX ADMIN — CLONIDINE HYDROCHLORIDE 0.1 MG: 0.1 TABLET ORAL at 22:51

## 2023-10-11 RX ADMIN — BUDESONIDE AND FORMOTEROL FUMARATE DIHYDRATE 2 PUFF: 160; 4.5 AEROSOL RESPIRATORY (INHALATION) at 22:58

## 2023-10-11 RX ADMIN — CLONAZEPAM 0.5 MG: 0.5 TABLET ORAL at 23:16

## 2023-10-11 RX ADMIN — INSULIN GLARGINE 24 UNITS: 100 INJECTION, SOLUTION SUBCUTANEOUS at 22:51

## 2023-10-11 RX ADMIN — FUROSEMIDE 40 MG: 10 INJECTION, SOLUTION INTRAMUSCULAR; INTRAVENOUS at 22:51

## 2023-10-11 RX ADMIN — BRIMONIDINE TARTRATE 1 DROP: 2 SOLUTION/ DROPS OPHTHALMIC at 23:01

## 2023-10-11 RX ADMIN — HEPARIN SODIUM 18 UNITS/KG/HR: 10000 INJECTION, SOLUTION INTRAVENOUS at 18:19

## 2023-10-11 RX ADMIN — HEPARIN SODIUM 8400 UNITS: 1000 INJECTION INTRAVENOUS; SUBCUTANEOUS at 18:19

## 2023-10-11 RX ADMIN — HEPARIN SODIUM 5000 UNITS: 5000 INJECTION INTRAVENOUS; SUBCUTANEOUS at 22:51

## 2023-10-11 RX ADMIN — GABAPENTIN 300 MG: 300 CAPSULE ORAL at 22:55

## 2023-10-11 RX ADMIN — PREDNISOLONE ACETATE 1 DROP: 10 SUSPENSION/ DROPS OPHTHALMIC at 22:53

## 2023-10-11 RX ADMIN — IPRATROPIUM BROMIDE AND ALBUTEROL SULFATE 3 ML: 2.5; .5 SOLUTION RESPIRATORY (INHALATION) at 16:42

## 2023-10-11 NOTE — ED NOTES
Heparin gtt orders questioned with provider due to negative trop and CT scan not yet started. Request for D-dimer order. Provider states to start heparin while awaiting VQ study.       Mehran Delgadillo RN  10/11/23 2015

## 2023-10-11 NOTE — ED PROVIDER NOTES
History  Chief Complaint   Patient presents with    Shortness of Breath     Patient reports productive cough for one week with shortness of breath unrelieved by home nebulizer treatments and sensations of "heart palpitations" while walking. Patient is a 81-year-old male with a past history of lung cancer who presents to the emergency room today for shortness of breath. Patient is currently in remission since 5 months ago. Patient reports shortness of breath that has been constant for 1 week and is worse today. Associated with productive cough, sore throat, chest tightness. Associated with palpitations that come and go, started 3 weeks ago and resolve on their own. Patient does not report palpitations today. Denies fevers, chills, headaches, dizziness, visual changes, congestion, chest pain, nausea, vomiting, abdominal pain, loss sensation, numbness/tingling, dysuria, flank pain, hematuria. Patient has been taking cough syrup, nebulizing treatments at home and Symbicort without relief. Patient has not tried anything today for symptoms. Patient reports history of a blood clot in his left arm 1 year ago. At this time, heart score 6. Shortness of Breath  Associated symptoms: cough (productive) and sore throat    Associated symptoms: no abdominal pain, no chest pain, no diaphoresis, no ear pain, no fever, no headaches, no rash and no vomiting        Prior to Admission Medications   Prescriptions Last Dose Informant Patient Reported? Taking?    Artificial Tear Solution (Just Tears Eye Drops) SOLN Past Month  Yes Yes   Sig: INSTILL 1 DROP BOTH EYES TWICE A DAY OR AS NEEDED FOR DRY EYE   Continuous Blood Gluc Sensor (FreeStyle Hugo 2 Sensor) MISC 10/10/2023  No Yes   Sig: Check blood sugars multiple times per day   Diclofenac Sodium (VOLTAREN) 1 %   Yes No   Sig: APPLY 2GM TO UPPER EXTREMITIES TOPICALLY TWICE A DAY **USE DOSING CARD** (DO NOT EXCEED 16 GRAMS DAILY TO ANY AFFECTED JOINT OF THE LOWER EXTREMITIES. DO NOT EXCEED 8 GRAMS DAILY TO ANY AFFECTED JOINT OF THE UPPER EXTREMITIES.  DO NOT EXCEED A TOTAL DOSE OF 32 GRAMS DAILY OVER ALL JOINTS)   PARoxetine (PAXIL) 30 mg tablet 10/11/2023  No Yes   Sig: TAKE 1 TABLET BY MOUTH EVERY MORNING   Symbicort 160-4.5 MCG/ACT inhaler 10/11/2023  No Yes   Sig: INHALE 2 PUFFS TWICE A DAY   amLODIPine (NORVASC) 10 mg tablet 10/11/2023  No Yes   Sig: TAKE 1 TABLET BY MOUTH EVERY DAY   aspirin (ECOTRIN LOW STRENGTH) 81 mg EC tablet 10/11/2023  Yes Yes   Sig: Take 81 mg by mouth daily   aspirin 325 mg tablet Not Taking  Yes No   Sig: Take 325 mg by mouth daily   Patient not taking: Reported on 10/11/2023   atorvastatin (LIPITOR) 40 mg tablet 10/10/2023  Yes Yes   Sig: TAKE 20MG (ONE-HALF TABLET) BY MOUTH EVERY DAY AT 5 PM FOR CHOLESTEROL   carboxymethylcellulose (REFRESH PLUS) 0.5 % SOLN Past Month  Yes Yes   Sig: INSTILL 1 DROP BOTH EYES THREE TIMES A DAY AS NEEDED FOR DRY EYES   cholecalciferol (VITAMIN D3) 400 units tablet 10/11/2023 Spouse/Significant Other Yes Yes   Sig: Take 400 Units by mouth daily   cloNIDine (CATAPRES) 0.1 mg tablet 10/10/2023  No Yes   Sig: TAKE 1 TABLET BY MOUTH TWICE A DAY   clonazePAM (KlonoPIN) 0.5 MG disintegrating tablet 10/11/2023  No Yes   Sig: Take 1 tablet (0.5 mg total) by mouth 2 (two) times a day   gabapentin (Neurontin) 300 mg capsule 10/11/2023  No Yes   Sig: Take 1 capsule (300 mg total) by mouth 3 (three) times a day   Patient taking differently: Take 300 mg by mouth 2 (two) times a day   glucose blood test strip 10/10/2023 Spouse/Significant Other Yes Yes   Si each by Other route daily as needed Use as instructed   insulin glargine (LANTUS) 100 units/mL subcutaneous injection 10/10/2023  Yes Yes   Sig: Inject 40 Units under the skin daily at bedtime   ipratropium-albuterol (DUO-NEB) 0.5-2.5 mg/3 mL nebulizer solution More than a month  Yes No   Sig: INHALE 1 VIAL IN NEBULIZER TWICE A DAY   ketotifen (ZADITOR) 0.025 % ophthalmic solution Past Month  Yes Yes   Sig: INSTILL 1 DROP BOTH EYES TWICE A DAY FOR ALLERGY   latanoprost (XALATAN) 0.005 % ophthalmic solution 10/10/2023  Yes Yes   Si drop daily at bedtime   lidocaine (LIDODERM) 5 % 10/10/2023  Yes Yes   Sig: APPLY UP TO 3 PATCHES TOPICALLY EVERY DAY FOR PAIN (REMOVE PATCH AFTER 12 HOURS; 12 HOURS ON AND 12 HOURS OFF)   multivitamin (THERAGRAN) TABS 10/11/2023 Spouse/Significant Other Yes Yes   Sig: Take 1 tablet by mouth daily   oxyCODONE (ROXICODONE) 10 MG TABS 10/11/2023  No Yes   Sig: Take 1 tablet (10 mg total) by mouth every 6 (six) hours as needed for moderate pain Max Daily Amount: 40 mg   spironolactone (ALDACTONE) 25 mg tablet 10/10/2023  No Yes   Sig: Take 1 tablet (25 mg total) by mouth daily   traZODone (DESYREL) 50 mg tablet 10/10/2023  No Yes   Sig: TAKE 1 TABLET BY MOUTH EVERYDAY AT BEDTIME      Facility-Administered Medications: None       Past Medical History:   Diagnosis Date    Cancer ([facility])     PROSTATE CANCER    Colon polyp     Hyperlipidemia     PTSD (post-traumatic stress disorder)     Type 2 diabetes mellitus without complication, with long-term current use of insulin ([facility]) 2020    Type 2 diabetes mellitus without complication, with long-term current use of insulin ([address]) 2020       Past Surgical History:   Procedure Laterality Date    APPENDECTOMY         Family History   Problem Relation Age of Onset    Diabetes Mother     Hypertension Mother     Multiple sclerosis Sister     Parkinsonism Sister      I have reviewed and agree with the history as documented.     E-Cigarette/Vaping    E-Cigarette Use Never User      E-Cigarette/Vaping Substances    Nicotine No     THC No     CBD No     Flavoring No     Other No     Unknown No      Social History     Tobacco Use    Smoking status: Never    Smokeless tobacco: Never   Vaping Use    Vaping Use: Never used   Substance Use Topics    Alcohol use: Not Currently    Drug use: Never       Review of Systems   Constitutional:  Negative for chills, diaphoresis, fatigue and fever. HENT:  Positive for sore throat. Negative for congestion, ear pain, sinus pressure, sinus pain, trouble swallowing and voice change. Eyes:  Negative for photophobia, pain and visual disturbance. Respiratory:  Positive for cough (productive), chest tightness and shortness of breath. Cardiovascular:  Positive for palpitations (episodic, none on initial evaluation). Negative for chest pain. Gastrointestinal:  Negative for abdominal pain, nausea and vomiting. Genitourinary:  Negative for dysuria, flank pain and hematuria. Musculoskeletal:  Negative for arthralgias and back pain. Skin:  Negative for color change and rash. Neurological:  Negative for dizziness, seizures, syncope, weakness, light-headedness and headaches. Psychiatric/Behavioral:  Negative for confusion. All other systems reviewed and are negative. Physical Exam  Physical Exam  Vitals and nursing note reviewed. Constitutional:       General: He is not in acute distress. Appearance: He is well-developed. HENT:      Head: Normocephalic and atraumatic. Right Ear: Tympanic membrane, ear canal and external ear normal.      Left Ear: Tympanic membrane, ear canal and external ear normal.      Nose: Nose normal.      Mouth/Throat:      Mouth: Mucous membranes are moist.   Eyes:      Conjunctiva/sclera: Conjunctivae normal.      Pupils: Pupils are equal, round, and reactive to light. Cardiovascular:      Rate and Rhythm: Regular rhythm. Tachycardia present. Pulses: Normal pulses. Heart sounds: Normal heart sounds. No murmur heard. Pulmonary:      Effort: Pulmonary effort is normal. No respiratory distress. Breath sounds: Rhonchi (bilateral) present. Abdominal:      Palpations: Abdomen is soft. Tenderness: There is no abdominal tenderness. There is no right CVA tenderness or left CVA tenderness.    Musculoskeletal: General: No swelling. Cervical back: Neck supple. Lymphadenopathy:      Cervical: No cervical adenopathy. Skin:     General: Skin is warm and dry. Capillary Refill: Capillary refill takes less than 2 seconds. Neurological:      Mental Status: He is alert and oriented to person, place, and time.    Psychiatric:         Mood and Affect: Mood normal.         Vital Signs  ED Triage Vitals   Temperature Pulse Respirations Blood Pressure SpO2   10/11/23 1558 10/11/23 1558 10/11/23 1558 10/11/23 1558 10/11/23 1558   98.6 °F (37 °C) (!) 106 18 145/76 96 %      Temp src Heart Rate Source Patient Position - Orthostatic VS BP Location FiO2 (%)   -- 10/11/23 1558 10/11/23 1730 10/11/23 1730 --    Monitor Sitting Left arm       Pain Score       --                  Vitals:    10/11/23 1900 10/11/23 1930 10/11/23 2015 10/11/23 2150   BP: 127/79 126/62 130/95 125/79   Pulse: 97 98 95 98   Patient Position - Orthostatic VS: Sitting            Visual Acuity      ED Medications  Medications   amLODIPine (NORVASC) tablet 10 mg (has no administration in time range)   glycerin-hypromellose- (ARTIFICIAL TEARS) ophthalmic solution 1 drop (has no administration in time range)   aspirin (ECOTRIN LOW STRENGTH) EC tablet 81 mg (has no administration in time range)   atorvastatin (LIPITOR) tablet 20 mg (has no administration in time range)   clonazePAM (KlonoPIN) tablet 0.5 mg (has no administration in time range)   cloNIDine (CATAPRES) tablet 0.1 mg (has no administration in time range)   Diclofenac Sodium (VOLTAREN) 1 % topical gel 2 g (has no administration in time range)   gabapentin (NEURONTIN) capsule 300 mg (has no administration in time range)   latanoprost (XALATAN) 0.005 % ophthalmic solution 1 drop (has no administration in time range)   spironolactone (ALDACTONE) tablet 25 mg (has no administration in time range)   budesonide-formoterol (SYMBICORT) 160-4.5 mcg/act inhaler 2 puff (has no administration in time range)   traZODone (DESYREL) tablet 50 mg (has no administration in time range)   PARoxetine (PAXIL) tablet 30 mg (has no administration in time range)   ketotifen (ZADITOR) 0.025 % ophthalmic solution 1 drop (has no administration in time range)   acetaminophen (TYLENOL) tablet 650 mg (has no administration in time range)   ondansetron (ZOFRAN) injection 4 mg (has no administration in time range)   furosemide (LASIX) injection 40 mg (has no administration in time range)   heparin (porcine) subcutaneous injection 5,000 Units (has no administration in time range)   insulin lispro (HumaLOG) 100 units/mL subcutaneous injection 1-6 Units (has no administration in time range)   prednisoLONE acetate (PRED FORTE) 1 % ophthalmic suspension 1 drop (has no administration in time range)   brimonidine tartrate 0.2 % ophthalmic solution 1 drop (has no administration in time range)   insulin glargine (LANTUS) subcutaneous injection 24 Units 0.24 mL (has no administration in time range)   ipratropium-albuterol (DUO-NEB) 0.5-2.5 mg/3 mL inhalation solution 3 mL (3 mL Nebulization Given 10/11/23 1642)   heparin (porcine) injection 8,400 Units (8,400 Units Intravenous Given 10/11/23 1819)       Diagnostic Studies  Results Reviewed       Procedure Component Value Units Date/Time    Procalcitonin [[id number]]  (Normal) Collected: 10/11/23 1639    Lab Status: Final result Specimen: Blood from Arm, Left Updated: 10/11/23 2030     Procalcitonin 0.13 ng/ml     HS Troponin I 4hr [[id number]]  (Normal) Collected: 10/11/23 1948    Lab Status: Final result Specimen: Blood from Arm, Right Updated: 10/11/23 2027     hs TnI 4hr 4 ng/L      Delta 4hr hsTnI -1 ng/L     HS Troponin I 2hr [[id number]]  (Normal) Collected: 10/11/23 1842    Lab Status: Final result Specimen: Blood from Arm, Left Updated: 10/11/23 1933     hs TnI 2hr 5 ng/L      Delta 2hr hsTnI 0 ng/L     B-Type Natriuretic Peptide(BNP) [[id number]]  (Normal) Collected: 10/11/23 1639    Lab Status: Final result Specimen: Blood from Arm, Left Updated: 10/11/23 1712     BNP 48 pg/mL     Magnesium [[id number]]  (Normal) Collected: 10/11/23 1639    Lab Status: Final result Specimen: Blood from Arm, Left Updated: 10/11/23 1708     Magnesium 1.9 mg/dL     APTT [[id number]]  (Abnormal) Collected: 10/11/23 1639    Lab Status: Final result Specimen: Blood from Arm, Left Updated: 10/11/23 1706     PTT 38 seconds     Protime-INR [[id number]]  (Abnormal) Collected: 10/11/23 1639    Lab Status: Final result Specimen: Blood from Arm, Left Updated: 10/11/23 1706     Protime 15.0 seconds      INR 1.11    COVID/FLU/RSV [[id number]]  (Normal) Collected: 10/11/23 1605    Lab Status: Final result Specimen: Nares from Nose Updated: 10/11/23 1648     SARS-CoV-2 Negative     INFLUENZA A PCR Negative     INFLUENZA B PCR Negative     RSV PCR Negative    Narrative:      FOR PEDIATRIC PATIENTS - copy/paste COVID Guidelines URL to browser: [email or web address]    SARS-CoV-2 assay is a Nucleic Acid Amplification assay intended for the  qualitative detection of nucleic acid from SARS-CoV-2 in nasopharyngeal  swabs. Results are for the presumptive identification of SARS-CoV-2 RNA. Positive results are indicative of infection with SARS-CoV-2, the virus  causing COVID-19, but do not rule out bacterial infection or co-infection  with other viruses. Laboratories within the [facility] and its  territories are required to report all positive results to the appropriate  public health authorities. Negative results do not preclude SARS-CoV-2  infection and should not be used as the sole basis for treatment or other  patient management decisions. Negative results must be combined with  clinical observations, patient history, and epidemiological information. This test has not been FDA cleared or approved.     This test has been authorized by FDA under an Emergency Use Authorization  (EUA). This test is only authorized for the duration of time the  declaration that circumstances exist justifying the authorization of the  emergency use of an in vitro diagnostic tests for detection of SARS-CoV-2  virus and/or diagnosis of COVID-19 infection under section 564(b)(1) of  the Act, 21 U. S.C. 020SWX-4(Q)(7), unless the authorization is terminated  or revoked sooner. The test has been validated but independent review by FDA  and CLIA is pending. Test performed using [employer]: This RT-PCR assay targets N2,  a region unique to SARS-CoV-2. A conserved region in the E-gene was chosen  for pan-Sarbecovirus detection which includes SARS-CoV-2. According to CMS-2020-01-R, this platform meets the definition of high-throughput technology.     HS Troponin 0hr (reflex protocol) [[id number]]  (Normal) Collected: 10/11/23 1605    Lab Status: Final result Specimen: Blood Updated: 10/11/23 1633     hs TnI 0hr 5 ng/L     Comprehensive metabolic panel [[id number]]  (Abnormal) Collected: 10/11/23 1605    Lab Status: Final result Specimen: Blood Updated: 10/11/23 1623     Sodium 139 mmol/L      Potassium 4.4 mmol/L      Chloride 107 mmol/L      CO2 23 mmol/L      ANION GAP 9 mmol/L      BUN 37 mg/dL      Creatinine 2.68 mg/dL      Glucose 84 mg/dL      Calcium 9.7 mg/dL      AST 14 U/L      ALT 15 U/L      Alkaline Phosphatase 69 U/L      Total Protein 7.9 g/dL      Albumin 4.4 g/dL      Total Bilirubin 0.70 mg/dL      eGFR 22 ml/min/1.73sq m     Narrative:      [place] guidelines for Chronic Kidney Disease (CKD):     Stage 1 with normal or high GFR (GFR > 90 mL/min/1.73 square meters)    Stage 2 Mild CKD (GFR = 60-89 mL/min/1.73 square meters)    Stage 3A Moderate CKD (GFR = 45-59 mL/min/1.73 square meters)    Stage 3B Moderate CKD (GFR = 30-44 mL/min/1.73 square meters)    Stage 4 Severe CKD (GFR = 15-29 mL/min/1.73 square meters)    Stage 5 End Stage CKD (GFR <15 mL/min/1.73 square meters)  Note: GFR calculation is accurate only with a steady state creatinine    CBC and differential [[id number]]  (Abnormal) Collected: 10/11/23 1605    Lab Status: Final result Specimen: Blood Updated: 10/11/23 1607     WBC 6.55 Thousand/uL      RBC 3.79 Million/uL      Hemoglobin 10.5 g/dL      Hematocrit 33.6 %      MCV 89 fL      MCH 27.7 pg      MCHC 31.3 g/dL      RDW 14.9 %      MPV 10.6 fL      Platelets 425 Thousands/uL      nRBC 1 /100 WBCs      Neutrophils Relative 69 %      Immat GRANS % 1 %      Lymphocytes Relative 16 %      Monocytes Relative 8 %      Eosinophils Relative 5 %      Basophils Relative 1 %      Neutrophils Absolute 4.57 Thousands/µL      Immature Grans Absolute 0.04 Thousand/uL      Lymphocytes Absolute 1.06 Thousands/µL      Monocytes Absolute 0.52 Thousand/µL      Eosinophils Absolute 0.33 Thousand/µL      Basophils Absolute 0.03 Thousands/µL                    CT chest without contrast   Final Result by Alison Varner MD (10/11 1806)      Unremarkable CT scan of the chest with only minor compressive atelectasis at the left lung base due to an elevated left hemidiaphragm. Workstation performed: [id number]         XR chest 1 view portable   Final Result by Alison Varner MD (10/11 1806)      No acute cardiopulmonary disease.                   Workstation performed: [id number]                    Procedures  ECG 12 Lead Documentation Only    Date/Time: 10/11/2023 4:02 PM    Performed by: Megan Khan PA-C  Authorized by: Megan Khan PA-C    Indications / Diagnosis:  SOB  ECG reviewed by me, the ED Provider: yes    Patient location:  ED  Previous ECG:     Previous ECG:  Unavailable  Interpretation:     Interpretation: non-specific    Rate:     ECG rate:  100    ECG rate assessment: normal    Rhythm:     Rhythm: sinus rhythm    T waves:     T waves: non-specific    Other findings:     Other findings: prolonged qTc interval             ED Course HEART Risk Score      Flowsheet Row Most Recent Value   Heart Score Risk Calculator    History 1 Filed at: 10/11/2023 1905   ECG 1 Filed at: 10/11/2023 1905   Age 2 Filed at: 10/11/2023 1905   Risk Factors 2 Filed at: 10/11/2023 1905   Troponin 0 Filed at: 10/11/2023 1905   HEART Score 6 Filed at: 10/11/2023 1905                          SBIRT 22yo+      Flowsheet Row Most Recent Value   Initial Alcohol Screen: US AUDIT-C     1. How often do you have a drink containing alcohol? 0 Filed at: 10/11/2023 1632   2. How many drinks containing alcohol do you have on a typical day you are drinking? 0 Filed at: 10/11/2023 1632   3a. Male UNDER 65: How often do you have five or more drinks on one occasion? 0 Filed at: 10/11/2023 1632   Audit-C Score 0 Filed at: 10/11/2023 7083   [facility]: How many times in the past year have you. .. Used an illegal drug or used a prescription medication for non-medical reasons? Never Filed at: 10/11/2023 6419                      Medical Decision Making  Patient is a 53-year-old male who has a past history of lung cancer, presents to the emergency room for shortness of breath. Shortness of breath has been ongoing for 1 week. Associated with productive cough, sore throat, chest tightness, palpitations. At this time, heart score 6. On exam, patient was tachycardic and had bilateral rhonchi. Was also hypoxic 88% when walking. Oxygen improved at rest.  CBC showed decreased hemoglobin 10.5. CMP showed elevated bun (37) and creatinine (2.68). Initial 0-hour troponin 5. APPT 38. Protime-INR 15.  Chest x-ray showed, "No acute cardiopulmonary disease". Chest CT without contrast showed, "Unremarkable CT scan of the chest with only minor compressive atelectasis at the left lung base due to an elevated left hemidiaphragm". Patient was given a DuoNeb with mild relief. Due to clinical suspicion for a PE, heparin was started in the emergency room.   Patient was not able to get a CT PE study here due to low GFR. Discussed case with SLIM. Patient to be admitted for observation and VQ scan. Discussed admission plan with patient. Patient agrees and understands admission plan. ZULEMAIM will take over care of patient and medical decision making. Problems Addressed:  SOB (shortness of breath): acute illness or injury    Amount and/or Complexity of Data Reviewed  Labs: ordered. Radiology: ordered. Risk  Prescription drug management. Decision regarding hospitalization. Disposition  Final diagnoses:   SOB (shortness of breath)     Time reflects when diagnosis was documented in both MDM as applicable and the Disposition within this note       Time User Action Codes Description Comment    10/11/2023  7:02 PM Mitch Gan Add [R06.02] SOB (shortness of breath)           ED Disposition       ED Disposition   Admit    Condition   Stable    Date/Time   Wed Oct 11, 2023 1910    Comment   Case was discussed with Dr. Raya Lee and the patient's admission status was agreed to be Admission Status: observation status to the service of Dr. Raya Lee.                Follow-up Information    None         Current Discharge Medication List        CONTINUE these medications which have NOT CHANGED    Details   amLODIPine (NORVASC) 10 mg tablet TAKE 1 TABLET BY MOUTH EVERY DAY  Qty: 90 tablet, Refills: 1    Associated Diagnoses: Essential hypertension      Artificial Tear Solution (Just Tears Eye Drops) SOLN INSTILL 1 DROP BOTH EYES TWICE A DAY OR AS NEEDED FOR DRY EYE      aspirin (ECOTRIN LOW STRENGTH) 81 mg EC tablet Take 81 mg by mouth daily      atorvastatin (LIPITOR) 40 mg tablet TAKE 20MG (ONE-HALF TABLET) BY MOUTH EVERY DAY AT 5 PM FOR CHOLESTEROL      carboxymethylcellulose (REFRESH PLUS) 0.5 % SOLN INSTILL 1 DROP BOTH EYES THREE TIMES A DAY AS NEEDED FOR DRY EYES      cholecalciferol (VITAMIN D3) 400 units tablet Take 400 Units by mouth daily      clonazePAM (KlonoPIN) 0.5 MG disintegrating tablet Take 1 tablet (0.5 mg total) by mouth 2 (two) times a day  Qty: 30 tablet, Refills: 0    Associated Diagnoses: Anxiety      cloNIDine (CATAPRES) 0.1 mg tablet TAKE 1 TABLET BY MOUTH TWICE A DAY  Qty: 180 tablet, Refills: 1    Associated Diagnoses: Essential hypertension      Continuous Blood Gluc Sensor (FreeStyle Hugo 2 Sensor) MISC Check blood sugars multiple times per day  Qty: 2 each, Refills: 5    Associated Diagnoses: Type 2 diabetes mellitus with diabetic neuropathy, without long-term current use of insulin ([facility])      gabapentin (Neurontin) 300 mg capsule Take 1 capsule (300 mg total) by mouth 3 (three) times a day  Qty: 270 capsule, Refills: 3    Associated Diagnoses: Type 2 diabetes mellitus with diabetic neuropathy, without long-term current use of insulin ([facility])      glucose blood test strip 1 each by Other route daily as needed Use as instructed      insulin glargine (LANTUS) 100 units/mL subcutaneous injection Inject 40 Units under the skin daily at bedtime      ketotifen (ZADITOR) 0.025 % ophthalmic solution INSTILL 1 DROP BOTH EYES TWICE A DAY FOR ALLERGY      latanoprost (XALATAN) 0.005 % ophthalmic solution 1 drop daily at bedtime      lidocaine (LIDODERM) 5 % APPLY UP TO 3 PATCHES TOPICALLY EVERY DAY FOR PAIN (REMOVE PATCH AFTER 12 HOURS; 12 HOURS ON AND 12 HOURS OFF)      multivitamin (THERAGRAN) TABS Take 1 tablet by mouth daily      oxyCODONE (ROXICODONE) 10 MG TABS Take 1 tablet (10 mg total) by mouth every 6 (six) hours as needed for moderate pain Max Daily Amount: 40 mg  Qty: 40 tablet, Refills: 0    Associated Diagnoses: Chronic bilateral low back pain without sciatica      PARoxetine (PAXIL) 30 mg tablet TAKE 1 TABLET BY MOUTH EVERY MORNING  Qty: 90 tablet, Refills: 1    Comments: DX Code Needed  .   Associated Diagnoses: Depression, unspecified depression type      spironolactone (ALDACTONE) 25 mg tablet Take 1 tablet (25 mg total) by mouth daily  Qty: 90 tablet, Refills: 1    Associated Diagnoses: Cardiomyopathy, unspecified type ([facility])      Symbicort 160-4.5 MCG/ACT inhaler INHALE 2 PUFFS TWICE A DAY  Qty: 10.2 g, Refills: 2    Associated Diagnoses: Chronic obstructive pulmonary disease, unspecified COPD type ([facility])      traZODone (DESYREL) 50 mg tablet TAKE 1 TABLET BY MOUTH EVERYDAY AT BEDTIME  Qty: 90 tablet, Refills: 1    Associated Diagnoses: Anxiety      aspirin 325 mg tablet Take 325 mg by mouth daily      Diclofenac Sodium (VOLTAREN) 1 % APPLY 2GM TO UPPER EXTREMITIES TOPICALLY TWICE A DAY **USE DOSING CARD** (DO NOT EXCEED 16 GRAMS DAILY TO ANY AFFECTED JOINT OF THE LOWER EXTREMITIES. DO NOT EXCEED 8 GRAMS DAILY TO ANY AFFECTED JOINT OF THE UPPER EXTREMITIES. DO NOT EXCEED A TOTAL DOSE OF 32 GRAMS DAILY OVER ALL JOINTS)      ipratropium-albuterol (DUO-NEB) 0.5-2.5 mg/3 mL nebulizer solution INHALE 1 VIAL IN NEBULIZER TWICE A DAY             No discharge procedures on file.     PDMP Review         Value Time User    PDMP Reviewed  Yes 7/6/2023  8:28 PM Baylee Connelly MD            ED Provider  Electronically Signed by             Taran Chase PA-C  10/11/23 6296

## 2023-10-12 ENCOUNTER — APPOINTMENT (OUTPATIENT)
Dept: NON INVASIVE DIAGNOSTICS | Facility: HOSPITAL | Age: 78
DRG: 191 | End: 2023-10-12
Payer: COMMERCIAL

## 2023-10-12 ENCOUNTER — APPOINTMENT (OUTPATIENT)
Dept: ULTRASOUND IMAGING | Facility: HOSPITAL | Age: 78
DRG: 191 | End: 2023-10-12
Payer: COMMERCIAL

## 2023-10-12 LAB
ANION GAP SERPL CALCULATED.3IONS-SCNC: 8 MMOL/L
AORTIC ROOT: 3.8 CM
AORTIC VALVE MEAN VELOCITY: 10.8 M/S
APICAL FOUR CHAMBER EJECTION FRACTION: 57 %
ASCENDING AORTA: 4.3 CM
ATRIAL RATE: 100 BPM
AV LVOT MEAN GRADIENT: 3 MMHG
AV LVOT PEAK GRADIENT: 5 MMHG
AV MEAN GRADIENT: 5 MMHG
AV PEAK GRADIENT: 10 MMHG
AV VELOCITY RATIO: 0.67
BACTERIA UR QL AUTO: NORMAL /HPF
BILIRUB UR QL STRIP: NEGATIVE
BUN SERPL-MCNC: 39 MG/DL (ref 5–25)
CALCIUM SERPL-MCNC: 9.6 MG/DL (ref 8.4–10.2)
CHLORIDE SERPL-SCNC: 107 MMOL/L (ref 96–108)
CLARITY UR: CLEAR
CO2 SERPL-SCNC: 24 MMOL/L (ref 21–32)
COLOR UR: NORMAL
CREAT SERPL-MCNC: 2.76 MG/DL (ref 0.6–1.3)
DOP CALC AO PEAK VEL: 1.59 M/S
DOP CALC AO VTI: 28.08 CM
DOP CALC LVOT PEAK VEL VTI: 24.13 CM
DOP CALC LVOT PEAK VEL: 1.07 M/S
E WAVE DECELERATION TIME: 214 MS
E/A RATIO: 0.65
ERYTHROCYTE [DISTWIDTH] IN BLOOD BY AUTOMATED COUNT: 14.7 % (ref 11.6–15.1)
FRACTIONAL SHORTENING: 40 (ref 28–44)
GFR SERPL CREATININE-BSD FRML MDRD: 21 ML/MIN/1.73SQ M
GLUCOSE P FAST SERPL-MCNC: 163 MG/DL (ref 65–99)
GLUCOSE SERPL-MCNC: 121 MG/DL (ref 65–140)
GLUCOSE SERPL-MCNC: 163 MG/DL (ref 65–140)
GLUCOSE SERPL-MCNC: 167 MG/DL (ref 65–140)
GLUCOSE SERPL-MCNC: 228 MG/DL (ref 65–140)
GLUCOSE SERPL-MCNC: 232 MG/DL (ref 65–140)
GLUCOSE SERPL-MCNC: 347 MG/DL (ref 65–140)
GLUCOSE UR STRIP-MCNC: NEGATIVE MG/DL
HCT VFR BLD AUTO: 31.4 % (ref 36.5–49.3)
HGB BLD-MCNC: 9.7 G/DL (ref 12–17)
HGB UR QL STRIP.AUTO: NEGATIVE
INTERVENTRICULAR SEPTUM IN DIASTOLE (PARASTERNAL SHORT AXIS VIEW): 1.3 CM
INTERVENTRICULAR SEPTUM: 1.3 CM (ref 0.6–1.1)
KETONES UR STRIP-MCNC: NEGATIVE MG/DL
LAAS-AP2: 28.3 CM2
LAAS-AP4: 22.6 CM2
LEFT ATRIUM AREA SYSTOLE SINGLE PLANE A4C: 19.7 CM2
LEFT ATRIUM SIZE: 3.7 CM
LEFT ATRIUM VOLUME (MOD BIPLANE): 76 ML
LEFT ATRIUM VOLUME INDEX (MOD BIPLANE): 34.9 ML/M2
LEFT INTERNAL DIMENSION IN SYSTOLE: 3 CM (ref 2.1–4)
LEFT VENTRICULAR INTERNAL DIMENSION IN DIASTOLE: 5 CM (ref 3.5–6)
LEFT VENTRICULAR POSTERIOR WALL IN END DIASTOLE: 1.2 CM
LEFT VENTRICULAR STROKE VOLUME: 85 ML
LEUKOCYTE ESTERASE UR QL STRIP: NEGATIVE
LVSV (TEICH): 85 ML
MCH RBC QN AUTO: 27.2 PG (ref 26.8–34.3)
MCHC RBC AUTO-ENTMCNC: 30.9 G/DL (ref 31.4–37.4)
MCV RBC AUTO: 88 FL (ref 82–98)
MV E'TISSUE VEL-LAT: 11 CM/S
MV E'TISSUE VEL-SEP: 8 CM/S
MV PEAK A VEL: 0.97 M/S
MV PEAK E VEL: 63 CM/S
MV STENOSIS PRESSURE HALF TIME: 62 MS
MV VALVE AREA P 1/2 METHOD: 3.55
NITRITE UR QL STRIP: NEGATIVE
NON-SQ EPI CELLS URNS QL MICRO: NORMAL /HPF
P AXIS: 31 DEGREES
PH UR STRIP.AUTO: 5 [PH]
PLATELET # BLD AUTO: 185 THOUSANDS/UL (ref 149–390)
PMV BLD AUTO: 11.3 FL (ref 8.9–12.7)
POTASSIUM SERPL-SCNC: 4.1 MMOL/L (ref 3.5–5.3)
PR INTERVAL: 140 MS
PROT UR STRIP-MCNC: NEGATIVE MG/DL
QRS AXIS: -19 DEGREES
QRSD INTERVAL: 72 MS
QT INTERVAL: 370 MS
QTC INTERVAL: 477 MS
RBC # BLD AUTO: 3.56 MILLION/UL (ref 3.88–5.62)
RBC #/AREA URNS AUTO: NORMAL /HPF
RIGHT ATRIUM AREA SYSTOLE A4C: 21.1 CM2
RIGHT VENTRICLE ID DIMENSION: 3.5 CM
SL CV LEFT ATRIUM LENGTH A2C: 6.5 CM
SL CV LV EF: 60
SL CV PED ECHO LEFT VENTRICLE DIASTOLIC VOLUME (MOD BIPLANE) 2D: 119 ML
SL CV PED ECHO LEFT VENTRICLE SYSTOLIC VOLUME (MOD BIPLANE) 2D: 34 ML
SODIUM SERPL-SCNC: 139 MMOL/L (ref 135–147)
SP GR UR STRIP.AUTO: 1.01 (ref 1–1.03)
T WAVE AXIS: 21 DEGREES
TRICUSPID ANNULAR PLANE SYSTOLIC EXCURSION: 2.1 CM
UROBILINOGEN UR STRIP-ACNC: <2 MG/DL
VENTRICULAR RATE: 100 BPM
WBC # BLD AUTO: 5.26 THOUSAND/UL (ref 4.31–10.16)
WBC #/AREA URNS AUTO: NORMAL /HPF

## 2023-10-12 PROCEDURE — C9113 INJ PANTOPRAZOLE SODIUM, VIA: HCPCS | Performed by: NURSE PRACTITIONER

## 2023-10-12 PROCEDURE — 97163 PT EVAL HIGH COMPLEX 45 MIN: CPT

## 2023-10-12 PROCEDURE — 76775 US EXAM ABDO BACK WALL LIM: CPT

## 2023-10-12 PROCEDURE — 85027 COMPLETE CBC AUTOMATED: CPT | Performed by: INTERNAL MEDICINE

## 2023-10-12 PROCEDURE — 82948 REAGENT STRIP/BLOOD GLUCOSE: CPT

## 2023-10-12 PROCEDURE — 81001 URINALYSIS AUTO W/SCOPE: CPT | Performed by: INTERNAL MEDICINE

## 2023-10-12 PROCEDURE — 99233 SBSQ HOSP IP/OBS HIGH 50: CPT | Performed by: NURSE PRACTITIONER

## 2023-10-12 PROCEDURE — 93306 TTE W/DOPPLER COMPLETE: CPT | Performed by: INTERNAL MEDICINE

## 2023-10-12 PROCEDURE — 94760 N-INVAS EAR/PLS OXIMETRY 1: CPT

## 2023-10-12 PROCEDURE — 94664 DEMO&/EVAL PT USE INHALER: CPT

## 2023-10-12 PROCEDURE — 93306 TTE W/DOPPLER COMPLETE: CPT

## 2023-10-12 PROCEDURE — 94640 AIRWAY INHALATION TREATMENT: CPT

## 2023-10-12 PROCEDURE — 94660 CPAP INITIATION&MGMT: CPT

## 2023-10-12 PROCEDURE — 97167 OT EVAL HIGH COMPLEX 60 MIN: CPT

## 2023-10-12 PROCEDURE — 93010 ELECTROCARDIOGRAM REPORT: CPT | Performed by: INTERNAL MEDICINE

## 2023-10-12 PROCEDURE — 80048 BASIC METABOLIC PNL TOTAL CA: CPT | Performed by: INTERNAL MEDICINE

## 2023-10-12 RX ORDER — LEVALBUTEROL INHALATION SOLUTION 1.25 MG/3ML
1.25 SOLUTION RESPIRATORY (INHALATION) EVERY 8 HOURS PRN
Status: DISCONTINUED | OUTPATIENT
Start: 2023-10-12 | End: 2023-10-18 | Stop reason: HOSPADM

## 2023-10-12 RX ORDER — INSULIN LISPRO 100 [IU]/ML
1-6 INJECTION, SOLUTION INTRAVENOUS; SUBCUTANEOUS
Status: DISCONTINUED | OUTPATIENT
Start: 2023-10-12 | End: 2023-10-18 | Stop reason: HOSPADM

## 2023-10-12 RX ORDER — ALBUTEROL SULFATE 2.5 MG/3ML
2.5 SOLUTION RESPIRATORY (INHALATION) EVERY 6 HOURS PRN
Status: DISCONTINUED | OUTPATIENT
Start: 2023-10-12 | End: 2023-10-12

## 2023-10-12 RX ORDER — PANTOPRAZOLE SODIUM 40 MG/10ML
40 INJECTION, POWDER, LYOPHILIZED, FOR SOLUTION INTRAVENOUS EVERY 12 HOURS SCHEDULED
Status: DISCONTINUED | OUTPATIENT
Start: 2023-10-12 | End: 2023-10-18 | Stop reason: HOSPADM

## 2023-10-12 RX ORDER — BENZONATATE 100 MG/1
100 CAPSULE ORAL 3 TIMES DAILY PRN
Status: DISCONTINUED | OUTPATIENT
Start: 2023-10-12 | End: 2023-10-13

## 2023-10-12 RX ORDER — GUAIFENESIN/DEXTROMETHORPHAN 100-10MG/5
10 SYRUP ORAL EVERY 4 HOURS PRN
Status: DISCONTINUED | OUTPATIENT
Start: 2023-10-12 | End: 2023-10-14

## 2023-10-12 RX ORDER — INSULIN GLARGINE 100 [IU]/ML
40 INJECTION, SOLUTION SUBCUTANEOUS
Status: DISCONTINUED | OUTPATIENT
Start: 2023-10-12 | End: 2023-10-14

## 2023-10-12 RX ORDER — METHYLPREDNISOLONE SODIUM SUCCINATE 40 MG/ML
40 INJECTION, POWDER, LYOPHILIZED, FOR SOLUTION INTRAMUSCULAR; INTRAVENOUS EVERY 8 HOURS SCHEDULED
Status: DISCONTINUED | OUTPATIENT
Start: 2023-10-12 | End: 2023-10-13

## 2023-10-12 RX ADMIN — INSULIN LISPRO 1 UNITS: 100 INJECTION, SOLUTION INTRAVENOUS; SUBCUTANEOUS at 12:39

## 2023-10-12 RX ADMIN — HEPARIN SODIUM 5000 UNITS: 5000 INJECTION INTRAVENOUS; SUBCUTANEOUS at 21:57

## 2023-10-12 RX ADMIN — ATORVASTATIN CALCIUM 20 MG: 20 TABLET, FILM COATED ORAL at 17:57

## 2023-10-12 RX ADMIN — SPIRONOLACTONE 25 MG: 25 TABLET, COATED ORAL at 08:57

## 2023-10-12 RX ADMIN — BRIMONIDINE TARTRATE 1 DROP: 2 SOLUTION/ DROPS OPHTHALMIC at 17:58

## 2023-10-12 RX ADMIN — LATANOPROST 1 DROP: 50 SOLUTION OPHTHALMIC at 21:59

## 2023-10-12 RX ADMIN — BRIMONIDINE TARTRATE 1 DROP: 2 SOLUTION/ DROPS OPHTHALMIC at 08:59

## 2023-10-12 RX ADMIN — PREDNISOLONE ACETATE 1 DROP: 10 SUSPENSION/ DROPS OPHTHALMIC at 21:59

## 2023-10-12 RX ADMIN — KETOTIFEN FUMARATE 1 DROP: 0.25 SOLUTION/ DROPS OPHTHALMIC at 08:58

## 2023-10-12 RX ADMIN — BUDESONIDE AND FORMOTEROL FUMARATE DIHYDRATE 2 PUFF: 160; 4.5 AEROSOL RESPIRATORY (INHALATION) at 08:58

## 2023-10-12 RX ADMIN — BUDESONIDE AND FORMOTEROL FUMARATE DIHYDRATE 2 PUFF: 160; 4.5 AEROSOL RESPIRATORY (INHALATION) at 21:59

## 2023-10-12 RX ADMIN — INSULIN LISPRO 5 UNITS: 100 INJECTION, SOLUTION INTRAVENOUS; SUBCUTANEOUS at 22:07

## 2023-10-12 RX ADMIN — DICLOFENAC SODIUM 2 G: 10 GEL TOPICAL at 12:40

## 2023-10-12 RX ADMIN — DICLOFENAC SODIUM 2 G: 10 GEL TOPICAL at 21:59

## 2023-10-12 RX ADMIN — KETOTIFEN FUMARATE 1 DROP: 0.25 SOLUTION/ DROPS OPHTHALMIC at 18:42

## 2023-10-12 RX ADMIN — HEPARIN SODIUM 5000 UNITS: 5000 INJECTION INTRAVENOUS; SUBCUTANEOUS at 15:09

## 2023-10-12 RX ADMIN — PANTOPRAZOLE SODIUM 40 MG: 40 INJECTION, POWDER, FOR SOLUTION INTRAVENOUS at 12:40

## 2023-10-12 RX ADMIN — FUROSEMIDE 40 MG: 10 INJECTION, SOLUTION INTRAMUSCULAR; INTRAVENOUS at 08:58

## 2023-10-12 RX ADMIN — INSULIN LISPRO 2 UNITS: 100 INJECTION, SOLUTION INTRAVENOUS; SUBCUTANEOUS at 18:36

## 2023-10-12 RX ADMIN — METHYLPREDNISOLONE SODIUM SUCCINATE 40 MG: 40 INJECTION, POWDER, FOR SOLUTION INTRAMUSCULAR; INTRAVENOUS at 15:09

## 2023-10-12 RX ADMIN — GABAPENTIN 300 MG: 300 CAPSULE ORAL at 08:57

## 2023-10-12 RX ADMIN — INSULIN GLARGINE 40 UNITS: 100 INJECTION, SOLUTION SUBCUTANEOUS at 21:57

## 2023-10-12 RX ADMIN — LEVALBUTEROL HYDROCHLORIDE 1.25 MG: 1.25 SOLUTION RESPIRATORY (INHALATION) at 13:26

## 2023-10-12 RX ADMIN — BRIMONIDINE TARTRATE 1 DROP: 2 SOLUTION/ DROPS OPHTHALMIC at 21:59

## 2023-10-12 RX ADMIN — CLONIDINE HYDROCHLORIDE 0.1 MG: 0.1 TABLET ORAL at 08:57

## 2023-10-12 RX ADMIN — ASPIRIN 81 MG: 81 TABLET, COATED ORAL at 08:57

## 2023-10-12 RX ADMIN — DICLOFENAC SODIUM 2 G: 10 GEL TOPICAL at 08:58

## 2023-10-12 RX ADMIN — BENZONATATE 100 MG: 100 CAPSULE ORAL at 12:35

## 2023-10-12 RX ADMIN — LATANOPROST 1 DROP: 50 SOLUTION OPHTHALMIC at 00:32

## 2023-10-12 RX ADMIN — LEVALBUTEROL HYDROCHLORIDE 1.25 MG: 1.25 SOLUTION RESPIRATORY (INHALATION) at 22:10

## 2023-10-12 RX ADMIN — CLONIDINE HYDROCHLORIDE 0.1 MG: 0.1 TABLET ORAL at 17:55

## 2023-10-12 RX ADMIN — AMLODIPINE BESYLATE 10 MG: 10 TABLET ORAL at 08:57

## 2023-10-12 RX ADMIN — PANTOPRAZOLE SODIUM 40 MG: 40 INJECTION, POWDER, FOR SOLUTION INTRAVENOUS at 22:01

## 2023-10-12 RX ADMIN — PAROXETINE HYDROCHLORIDE 30 MG: 20 TABLET, FILM COATED ORAL at 08:56

## 2023-10-12 RX ADMIN — GABAPENTIN 300 MG: 300 CAPSULE ORAL at 17:57

## 2023-10-12 RX ADMIN — GUAIFENESIN AND DEXTROMETHORPHAN 10 ML: 100; 10 SYRUP ORAL at 12:35

## 2023-10-12 RX ADMIN — TRAZODONE HYDROCHLORIDE 50 MG: 50 TABLET ORAL at 21:57

## 2023-10-12 RX ADMIN — HEPARIN SODIUM 5000 UNITS: 5000 INJECTION INTRAVENOUS; SUBCUTANEOUS at 05:02

## 2023-10-12 RX ADMIN — METHYLPREDNISOLONE SODIUM SUCCINATE 40 MG: 40 INJECTION, POWDER, FOR SOLUTION INTRAMUSCULAR; INTRAVENOUS at 22:01

## 2023-10-12 NOTE — OCCUPATIONAL THERAPY NOTE
Occupational Therapy Evaluation     Patient Name: Abilio Beltran  Today's Date: 10/12/2023    Problem List  Principal Problem:    Shortness of breath  Active Problems:    Essential hypertension    Type 2 diabetes mellitus without complication, with long-term current use of insulin (HCC)    Primary hyperaldosteronism (HCC)    Stage 4 chronic kidney disease (HCC)    Chronic obstructive pulmonary disease, unspecified COPD type ([address])    Malignant neoplasm of lower lobe of left lung (HCC)    Chronic respiratory failure with hypoxia (HCC)    Past Medical History  Past Medical History:   Diagnosis Date    Cancer ([address])     PROSTATE CANCER    Colon polyp     Hyperlipidemia     PTSD (post-traumatic stress disorder)     Type 2 diabetes mellitus without complication, with long-term current use of insulin ([address]) 5/29/2020    Type 2 diabetes mellitus without complication, with long-term current use of insulin ([address]) 5/29/2020     Past Surgical History  Past Surgical History:   Procedure Laterality Date    APPENDECTOMY           10/12/23 0841   OT Last Visit   OT Visit Date 10/12/23   Note Type   Note type Evaluation   Pain Assessment   Pain Assessment Tool FLACC   Pain Score 9   Pain Location/Orientation Location: Chest  (throat)   Pain Rating: FLACC (Rest) - Face 0   Pain Rating: FLACC (Rest) - Legs 0   Pain Rating: FLACC (Rest) - Activity 0   Pain Rating: FLACC (Rest) - Cry 1   Pain Rating: FLACC (Rest) - Consolability 1   Score: FLACC (Rest) 2   Pain Rating: FLACC (Activity) - Face 1   Pain Rating: FLACC (Activity) - Legs 0   Pain Rating: FLACC (Activity) - Activity 1   Pain Rating: FLACC (Activity) - Cry 1   Pain Rating: FLACC (Activity) - Consolability 1   Score: FLACC (Activity) 4   Restrictions/Precautions   Weight Bearing Precautions Per Order No   Braces or Orthoses   (none)   Other Precautions Chair Alarm; Bed Alarm; Fall Risk;Pain   Home Living   Type of [address] [place] [address]  Multi-level;Performs ADLs on one level;Able to live on main level with bedroom/bathroom; Ramped entrance   [employer]   (walk-in tub)   Bathroom Toilet Raised   Bathroom Equipment Grab bars in shower;Grab bars around toilet;Built-in shower seat   [address] Electric scooter  (rollator)   Prior Function   Level of Page Independent with ADLs; Independent with functional mobility; Needs assistance with IADLS  (uses rollator)   Lives With Spouse   Receives Help From Family   IADLs Family/Friend/Other provides transportation; Family/Friend/Other provides meals; Family/Friend/Other provides medication management   Falls in the last 6 months 0   Vocational Retired   General   Family/Caregiver Present No   Subjective   Subjective Pt agreeable to therapy evaluation   ADL   Where Assessed Other (Comment)  (Assist levels for some self care tasks are based on functional assessment of performance skills and deficits observed during session.)   Eating Assistance 5  Supervision/Setup   Eating Deficit Setup   Grooming Assistance 5  Supervision/Setup   Grooming Deficit Setup;Supervision/safety  (while seated)   UB Dressing Assistance 5  Supervision/Setup   UB Dressing Deficit Setup   LB Dressing Assistance 4  Minimal Assistance   LB Dressing Deficit Setup   Bed Mobility   Supine to Sit 5  Supervision   Additional items Assist x 1;HOB elevated; Increased time required;Verbal cues   Transfers   Sit to Stand 4  Minimal assistance   Additional items Assist x 1; Increased time required;Verbal cues   Stand to Sit 4  Minimal assistance   Additional items Assist x 1; Increased time required;Verbal cues   Stand pivot 4  Minimal assistance   Additional items Assist x 1; Increased time required   Additional Comments with RW   Activity Tolerance   Activity Tolerance Patient limited by fatigue   Medical Staff Made Aware Pt seen for co-evaluation with Physical Therapist due to pt's medical complexity, functional limitations and limited activity tolerance. Nurse Made Aware Violet Clubs RN   RUE Assessment   RUE Assessment WFL   LUE Assessment   LUE Assessment WFL   Hand Function   Gross Motor Coordination Functional   Fine Motor Coordination Functional   Sensation   Light Touch Partial deficits in the RLE;Partial deficits in the LLE  (baseline neuropathy)   Vision-Basic Assessment   Current Vision Does not wear glasses   Visual History Corrective eye surgery   Psychosocial   Psychosocial (WDL) WDL   Cognition   Overall Cognitive Status WFL   Arousal/Participation Alert; Cooperative   Attention Within functional limits   Orientation Level Oriented X4   Memory Within functional limits   Following Commands Follows all commands and directions without difficulty   Assessment   Limitation Decreased ADL status; Decreased endurance;Decreased high-level ADLs   Assessment Pt is a 70 y.o. male seen for OT evaluation s/p admit to [facility] on 10/11/2023 w/ Shortness of breath. Comorbidities affecting pt's functional performance at time of assessment include: DM, HTN, obesity, previous surgery, COPD, neuropathy, and CKD . Personal factors affecting pt at time of IE include:steps to enter environment, difficulty performing ADLS, difficulty performing IADLS , and health management . Prior to admission, pt was independent with most ADLs (received assistance with socks/shoes), and independent with functional mobility with use of rollator. Upon evaluation: Pt requires Minimal Assistance x1 with RW 2* the following deficits impacting occupational performance: decreased strength, decreased balance, decreased tolerance, and increased pain. Pt to benefit from continued skilled OT tx while in the hospital to address deficits as defined above and maximize level of functional independence w ADL's and functional mobility. Occupational Performance areas to address include: grooming, bathing/shower, toilet hygiene, dressing, and functional mobility.  From OT standpoint, recommendation at time of d/c would be home with [address] [place] [address] Goals   Patient Goals to return home   Plan   Treatment Interventions ADL retraining;Functional transfer training; Endurance training;Patient/family training   Goal Expiration Date 10/26/23   OT Treatment Day 0   OT Frequency 2-3x/wk   Discharge Recommendation   OT Discharge Recommendation Home with home health rehabilitation   Additional Comments  The patient's raw score on the AM-PAC Daily Activity Inpatient Short Form is 21. A raw score of greater than or equal to 19 suggests the patient may benefit from discharge to home. Please refer to the recommendation of the Occupational Therapist for safe discharge planning.    AM-PAC Daily Activity Inpatient   Lower Body Dressing 3   Bathing 3   Toileting 3   Upper Body Dressing 4   Grooming 4   Eating 4   Daily Activity Raw Score 21   Daily Activity Standardized Score (Calc for Raw Score >=11) 44.27   -PAC Applied Cognition Inpatient   Following a Speech/Presentation 4   Understanding Ordinary Conversation 4   Taking Medications 4   Remembering Where Things Are Placed or Put Away 4   Remembering List of 4-5 Errands 4   Taking Care of Complicated Tasks 4   Applied Cognition Raw Score 24   Applied Cognition Standardized Score 62.21       Goals: to be met by 10/26/23     Patient will perform functional bed mobility with modified independence, with HOB flat, no rails  Patient will perform functional transfers with modified independence in preparation for ADL tasks, with good safety awareness  Patient will perform UB dressing task with modified independence while seated, with set up  Patient will perform LB dressing task with Minimal Assistance  Patient will perform toilet transfer with modified independence  Patient will perform toileting with modified independence, including hygiene and clothing management   Patient will improve activity tolerance by participating in 20 minutes of session at a time in preparation for participation in ADL tasks  Patient will identify 3 potential fall hazards and identify compensatory techniques to decrease fall risk in the home environment         Sammie Brittle, OTR/L

## 2023-10-12 NOTE — PLAN OF CARE
Problem: PHYSICAL THERAPY ADULT  Goal: Performs mobility at highest level of function for planned discharge setting. See evaluation for individualized goals. Description: Treatment/Interventions: Functional transfer training, LE strengthening/ROM, Therapeutic exercise, Endurance training, Patient/family training, Equipment eval/education, Gait training, Bed mobility, Spoke to nursing, OT  Equipment Recommended: Robyn Alonso (LORRI)       See flowsheet documentation for full assessment, interventions and recommendations. Note: Prognosis: Good  Problem List: Decreased strength, Decreased endurance, Impaired balance, Decreased mobility, Pain, Impaired sensation, Obesity  Assessment: Pt is 70 y.o. male seen for PT evaluation on 10/12/2023 s/p admit to [address] [facility] on 10/11/2023 w/ Shortness of breath. PT was consulted to assess pt's functional mobility and d/c needs. Order placed for PT eval and tx. PTA, pt resides with spouse in [facility] with ramped entrance, 1st floor set up, ambulates with rollator. At time of eval, pt performing bed mobility SBA; transfers, level surface household distance gait trial min A. Upon evaluation, pt presenting with impaired functional mobility d/t decreased strength, decreased endurance, impaired balance, decreased mobility, impaired sensation, obesity c BMI of 37.36 kg/m2, pain, and activity intolerance. Pertinent PMHx and current co-morbidities affecting pt's physical performance at time of assessment include: SOB, HTN, type 2 DM, stage 4 CKD, COPD, malignant neoplasm of L lung lower lobe, prostate CA, depression, LBP, HLD, morbid obesity, bronchospasm, recurrent major depressive disorder, dilatation of thoracic aorta. Personal factors affecting pt at time of eval include: ambulating w/ assistive device and inability to navigate community distances.  The following objective measures performed on IE also reveal limitations: Barthel Index: 55/100, Modified Miguel Angel: 4 (moderate/severe disability), and -PAC 6-Clicks: 03/52. Pt's clinical presentation is currently unstable/unpredictable seen in pt's presentation of abnormal lab value(s), need for input for task focus and mobility technique, and ongoing medical assessment. Overall, pt's rehab potential and prognosis to return to PLOF is good as impacted by objective findings, warranting pt to receive further skilled PT interventions to address identified impairments, activity limitation(s), and participation restriction(s). Pt to benefit from continued PT tx to address deficits as defined above and maximize level of functional independent mobility. From PT/mobility standpoint, recommendation at time of d/c would be home with home health rehabilitation pending progress in order to facilitate return to PLOF. Barriers to Discharge: Inaccessible home environment     PT Discharge Recommendation: Home with home health rehabilitation    See flowsheet documentation for full assessment.

## 2023-10-12 NOTE — ASSESSMENT & PLAN NOTE
History of CKD stg 4; with baseline creatinine likely around 2.2-2.5, no updated labs this year.   Creatinine noted to be 2.68 on admission, noted to be 2.76  Follow up with Nephrology on discharge  [facility] Kidney/bladder pending for today

## 2023-10-12 NOTE — RESPIRATORY THERAPY NOTE
RT Protocol Note  Freddy Carrasco 70 y.o. male MRN: [id number]  Unit/Bed#: -01 Encounter: [id number]    Assessment    Principal Problem:    Shortness of breath  Active Problems:    Essential hypertension    Type 2 diabetes mellitus without complication, with long-term current use of insulin (HCC)    Primary hyperaldosteronism (HCC)    Stage 4 chronic kidney disease (HCC)    Chronic obstructive pulmonary disease, unspecified COPD type ([address])    Malignant neoplasm of lower lobe of left lung ([address])    Chronic respiratory failure with hypoxia ([address])      Home Pulmonary Medications:  Inhaler/Neb    Home Devices/Therapy: Home O2, BiPAP/CPAP    Past Medical History:   Diagnosis Date    Cancer ([address])     PROSTATE CANCER    Colon polyp     Hyperlipidemia     PTSD (post-traumatic stress disorder)     Type 2 diabetes mellitus without complication, with long-term current use of insulin ([address]) 2020    Type 2 diabetes mellitus without complication, with long-term current use of insulin ([facility]) 2020     Social History     Socioeconomic History    Marital status: /Civil Union     Spouse name: None    Number of children: None    Years of education: None    Highest education level: None   Occupational History    None   Tobacco Use    Smoking status: Never    Smokeless tobacco: Never   Vaping Use    Vaping Use: Never used   Substance and Sexual Activity    Alcohol use: Not Currently    Drug use: Never    Sexual activity: None   Other Topics Concern    None   Social History Narrative    Most recent tobacco use screenin-      Do you currently or have you served in the [address] [place] [address] Street:   Yes      If Yes, What branch of service:   [employer]      Were you activated, into active duty, as a member of the [employer] or as a Reservist:   No      Marital status:         Exercise level:    Moderate      Diet:   Regular      General stress level:   High      Alcohol intake:   None      Caffeine intake:   None Seat belts used routinely:   Yes      Sunscreen used routinely:   Yes      Smoke alarm in home:   Yes      Advance directive:   Yes      Social Determinants of Health     Financial Resource Strain: High Risk (9/15/2023)    Overall Financial Resource Strain (CARDIA)     Difficulty of Paying Living Expenses: Hard   Food Insecurity: Not on file   Transportation Needs: No Transportation Needs (9/15/2023)    PRAPARE - Transportation     Lack of Transportation (Medical): No     Lack of Transportation (Non-Medical): No   Physical Activity: Not on file   Stress: Not on file   Social Connections: Not on file   Intimate Partner Violence: Not on file   Housing Stability: Not on file       Subjective         Objective    Physical Exam:   Assessment Type: Pre-treatment  General Appearance: Alert, Awake  Respiratory Pattern: Normal  Chest Assessment: Chest expansion symmetrical  Bilateral Breath Sounds: Diminished, Scattered, Crackles  Cough: Strong, Dry, Non-productive  O2 Device: RA    Vitals:  Blood pressure 123/69, pulse (P) 85, temperature 98.4 °F (36.9 °C), resp. rate (P) 18, height 5' 7" (1.702 m), weight 108 kg (238 lb), SpO2 94 %. Imaging and other studies: I have personally reviewed pertinent reports. O2 Device: RA     Plan    Respiratory Plan: Home Bronchodilator Patient pathway        Resp Comments: Pt states he uses Duoneb as needed at home. Ordered Xoponex. Tx given at this time.

## 2023-10-12 NOTE — PLAN OF CARE
Problem: OCCUPATIONAL THERAPY ADULT  Goal: Performs self-care activities at highest level of function for planned discharge setting. See evaluation for individualized goals. Description: Treatment Interventions: ADL retraining, Functional transfer training, Endurance training, Patient/family training          See flowsheet documentation for full assessment, interventions and recommendations. Outcome: Progressing  Note: Limitation: Decreased ADL status, Decreased endurance, Decreased high-level ADLs     Assessment: Pt is a 70 y.o. male seen for OT evaluation s/p admit to [facility] on 10/11/2023 w/ Shortness of breath. Comorbidities affecting pt's functional performance at time of assessment include: DM, HTN, obesity, previous surgery, COPD, neuropathy, and CKD . Personal factors affecting pt at time of IE include:steps to enter environment, difficulty performing ADLS, difficulty performing IADLS , and health management . Prior to admission, pt was independent with most ADLs (received assistance with socks/shoes), and independent with functional mobility with use of rollator. Upon evaluation: Pt requires Minimal Assistance x1 with RW 2* the following deficits impacting occupational performance: decreased strength, decreased balance, decreased tolerance, and increased pain. Pt to benefit from continued skilled OT tx while in the hospital to address deficits as defined above and maximize level of functional independence w ADL's and functional mobility. Occupational Performance areas to address include: grooming, bathing/shower, toilet hygiene, dressing, and functional mobility. From OT standpoint, recommendation at time of d/c would be home with [facility].      OT Discharge Recommendation: Home with home health rehabilitation        SUSAN Parham/L

## 2023-10-12 NOTE — RESPIRATORY THERAPY NOTE
RT Protocol Note  Kendal Lorenzo 70 y.o. male MRN: [id number]  Unit/Bed#: -01 Encounter: [id number]    Assessment    Principal Problem:    Shortness of breath  Active Problems:    Essential hypertension    Type 2 diabetes mellitus without complication, with long-term current use of insulin (HCC)    Primary hyperaldosteronism (HCC)    Stage 4 chronic kidney disease (HCC)    Chronic obstructive pulmonary disease, unspecified COPD type ([facility])    Malignant neoplasm of lower lobe of left lung ([address] [facility])    Chronic respiratory failure with hypoxia ([facility])      Home Pulmonary Medications:  Albuterol PRN  Home Devices/Therapy: Home O2, BiPAP/CPAP    Past Medical History:   Diagnosis Date    Cancer ([facility])     PROSTATE CANCER    Colon polyp     Hyperlipidemia     PTSD (post-traumatic stress disorder)     Type 2 diabetes mellitus without complication, with long-term current use of insulin ([facility]) 2020    Type 2 diabetes mellitus without complication, with long-term current use of insulin ([facility]) 2020     Social History     Socioeconomic History    Marital status: /Civil Union     Spouse name: None    Number of children: None    Years of education: None    Highest education level: None   Occupational History    None   Tobacco Use    Smoking status: Never    Smokeless tobacco: Never   Vaping Use    Vaping Use: Never used   Substance and Sexual Activity    Alcohol use: Not Currently    Drug use: Never    Sexual activity: None   Other Topics Concern    None   Social History Narrative    Most recent tobacco use screenin-      Do you currently or have you served in the [address] [place] [address] [employer]:   Yes      If Yes, What branch of service:   [employer]      Were you activated, into active duty, as a member of the [employer] or as a Reservist:   No      Marital status:         Exercise level:    Moderate      Diet:   Regular      General stress level:   High      Alcohol intake:   None      Caffeine intake:   None Seat belts used routinely:   Yes      Sunscreen used routinely:   Yes      Smoke alarm in home:   Yes      Advance directive:   Yes      Social Determinants of Health     Financial Resource Strain: High Risk (9/15/2023)    Overall Financial Resource Strain (CARDIA)     Difficulty of Paying Living Expenses: Hard   Food Insecurity: Not on file   Transportation Needs: No Transportation Needs (9/15/2023)    PRAPARE - Transportation     Lack of Transportation (Medical): No     Lack of Transportation (Non-Medical): No   Physical Activity: Not on file   Stress: Not on file   Social Connections: Not on file   Intimate Partner Violence: Not on file   Housing Stability: Not on file       Subjective         Objective    Physical Exam:   Assessment Type: Assess only  General Appearance: Alert, Awake  Respiratory Pattern: Normal  Chest Assessment: Chest expansion symmetrical  Bilateral Breath Sounds: Diminished, Coarse  O2 Device: V60    Vitals:  Blood pressure 126/74, pulse 93, temperature 98.4 °F (36.9 °C), resp. rate 17, weight 108 kg (238 lb 15.7 oz), SpO2 94 %. Imaging and other studies: I have personally reviewed pertinent reports.       O2 Device: V60     Plan    Respiratory Plan: Home Bronchodilator Patient pathway        Resp Comments: pt placed on BiPAP for hours of sleep

## 2023-10-12 NOTE — RESPIRATORY THERAPY NOTE
10/12/23 0012   Respiratory Assessment   Resp Comments pt placed on BiPAP for hours of sleep   O2 Device V60   Non-Invasive Information   O2 Interface Device Face mask   Non-Invasive Ventilation Mode BiPAP   $ Intermittent NIV Yes   $ Pulse Oximetry Spot Check Charge Completed   Non-Invasive Settings   IPAP (cm) 12 cm   EPAP (cm) 6 cm   Rate (Set) 8   FiO2 (%) 32   Non-Invasive Readings   Skin Intervention Skin intact   Total Rate 32   Peak Pressure (Obs) 15   Spontaneous Vt (mL) 445   Leak (lpm) 5   Non-Invasive Alarms   Insp Pressure High (cm H20) 35   Insp Pressure Low (cm H20) 8   Low Insp Pressure Time (sec) 20 sec   MV Low (L/min) 3   Vt High (mL) 1200   Vt Low (mL) 200   High Resp Rate (BPM) 40 BPM   Low Resp Rate (BPM) 8 BPM

## 2023-10-12 NOTE — ASSESSMENT & PLAN NOTE
Patient presented to the ED with complaints of worsening shortness of breath x 1 week. BNP 48.  CT imaging negative for volume overload, BNP negative. Procalcitonin negative. Covid/Flu/RSV Negative  CT Chest (10/11/23):  Unremarkable CT scan of the chest with only minor compressive atelectasis at the left lung base due to an elevated left hemidiaphragm   Echo ordered, complete today   If Negative, discharge home  Currently on  RA, %

## 2023-10-12 NOTE — PHYSICAL THERAPY NOTE
Physical Therapy Evaluation     Patient's Name: Royce Adame    Admitting Diagnosis  SOB (shortness of breath) [R06.02]    Problem List  Patient Active Problem List   Diagnosis    Prostate cancer ([address])    Depression    Essential hypertension    Low back pain    Type 2 diabetes mellitus without complication, with long-term current use of insulin (HCC)    Hyperlipidemia    Obesity, morbid ([address])    Primary hyperaldosteronism ([address])    Stage 4 chronic kidney disease (HCC)    Bronchospasm    Chronic obstructive pulmonary disease, unspecified COPD type ([address])    Cardiomyopathy, unspecified type ([address])    Malignant neoplasm of lower lobe of left lung (HCC)    Recurrent major depressive disorder, in remission ([address])    Dilatation of thoracic aorta (HCC)    Shortness of breath    Chronic respiratory failure with hypoxia ([address])       Past Medical History  Past Medical History:   Diagnosis Date    Cancer ([address])     PROSTATE CANCER    Colon polyp     Hyperlipidemia     PTSD (post-traumatic stress disorder)     Type 2 diabetes mellitus without complication, with long-term current use of insulin ([address]) 5/29/2020    Type 2 diabetes mellitus without complication, with long-term current use of insulin ([address]) 5/29/2020       Past Surgical History  Past Surgical History:   Procedure Laterality Date    APPENDECTOMY          10/12/23 0842   PT Last Visit   PT Visit Date 10/12/23   Note Type   Note type Evaluation   Pain Assessment   Pain Assessment Tool 0-10   Pain Score 9   Pain Location/Orientation Location: [facility]   Hospital Pain Intervention(s)   (NSG aware)   Pain Rating: FLACC (Rest) - Face 0   Pain Rating: FLACC (Rest) - Legs 0   Pain Rating: FLACC (Rest) - Activity 0   Pain Rating: FLACC (Rest) - Cry 1   Pain Rating: FLACC (Rest) - Consolability 1   Score: FLACC (Rest) 2   Pain Rating: FLACC (Activity) - Face 1   Pain Rating: FLACC (Activity) - Legs 0   Pain Rating: FLACC (Activity) - Activity 1   Pain Rating: FLACC (Activity) - Cry 1 Pain Rating: FLACC (Activity) - Consolability 1   Score: FLACC (Activity) 4   Restrictions/Precautions   Weight Bearing Precautions Per Order No   Braces or Orthoses   (none)   Other Precautions Chair Alarm; Bed Alarm; Fall Risk;Pain   Home Living   Type of [facility]  Multi-level;Performs ADLs on one level; Able to live on main level with bedroom/bathroom; Ramped entrance   [employer]   (walk-in tub)   Bathroom Toilet Raised   Bathroom Equipment Grab bars in shower;Grab bars around toilet;Built-in shower seat   [address] [employer] scooter  (rollator)   Prior Function   Level of [place] Independent with ADLs; Independent with functional mobility; Needs assistance with IADLS  (uses rollator)   Lives With Spouse   Receives Help From Family   IADLs Family/Friend/Other provides transportation; Family/Friend/Other provides meals; Family/Friend/Other provides medication management   Falls in the last 6 months 0   Vocational Retired   General   Family/Caregiver Present No   Cognition   Overall Cognitive Status WFL   Arousal/Participation Alert   Orientation Level Oriented X4   Memory Within functional limits   Following Commands Follows all commands and directions without difficulty   RLE Assessment   RLE Assessment   (grossly 3+/5)   LLE Assessment   LLE Assessment   (grossly 3+/5)   Vision-Basic Assessment   Current Vision Does not wear glasses   Visual History Corrective eye surgery   Coordination   Movements are Fluid and Coordinated 1   Sensation X  (baseline neuropathy)   Light Touch   RLE Light Touch Impaired   LLE Light Touch Impaired   Bed Mobility   Supine to Sit 5  Supervision   Additional items Assist x 1;HOB elevated; Increased time required;Verbal cues   Transfers   Sit to Stand 4  Minimal assistance   Additional items Assist x 1; Increased time required;Verbal cues   Stand to Sit 4  Minimal assistance   Additional items Assist x 1; Increased time required;Verbal cues   Additional Comments pt denying lightheadedness/dizziness t/o transitional movements   Ambulation/Elevation   Gait pattern Decreased foot clearance; Short stride; Step to   Gait Assistance 4  Minimal assist   Additional items Assist x 1;Verbal cues   Assistive Device Rolling walker   Distance 15'   Balance   Static Sitting Good   Dynamic Sitting Fair +   Static Standing Fair   Dynamic Standing Fair -   Ambulatory Fair -   Endurance Deficit   Endurance Deficit Yes   Activity Tolerance   Activity Tolerance Patient limited by fatigue   Medical Staff Made Aware Pt seen as a co-eval with OT due to the patient's co-morbidities, clinically unstable presentation, and present impairments which are a regression from the patient's baseline. Nurse Made Aware SADIQ Ochoa   Assessment   Prognosis Good   Problem List Decreased strength;Decreased endurance; Impaired balance;Decreased mobility;Pain; Impaired sensation;Obesity   Assessment Pt is 70 y.o. male seen for PT evaluation on 10/12/2023 s/p [facility] on 10/11/2023 w/ Shortness of breath. PT was consulted to assess pt's functional mobility and d/c needs. Order placed for PT eval and tx. PTA, pt resides with spouse in [facility] with ramped entrance, 1st floor set up, ambulates with rollator. At time of eval, pt performing bed mobility SBA; transfers, level surface household distance gait trial min A. Upon evaluation, pt presenting with impaired functional mobility d/t decreased strength, decreased endurance, impaired balance, decreased mobility, impaired sensation, obesity c BMI of 37.36 kg/m2, pain, and activity intolerance. Pertinent PMHx and current co-morbidities affecting pt's physical performance at time of assessment include: SOB, HTN, type 2 DM, stage 4 CKD, COPD, malignant neoplasm of L lung lower lobe, prostate CA, depression, LBP, HLD, morbid obesity, bronchospasm, recurrent major depressive disorder, dilatation of thoracic aorta. Personal factors affecting pt at time of eval include: ambulating w/ assistive device and inability to navigate community distances. The following objective measures performed on IE also reveal limitations: Barthel Index: 55/100, Modified [place]: 4 (moderate/severe disability), and AM-PAC 6-Clicks: 41/94. Pt's clinical presentation is currently unstable/unpredictable seen in pt's presentation of abnormal lab value(s), need for input for task focus and mobility technique, and ongoing medical assessment. Overall, pt's rehab potential and prognosis to return to PLOF is good as impacted by objective findings, warranting pt to receive further skilled PT interventions to address identified impairments, activity limitation(s), and participation restriction(s). Pt to benefit from continued PT tx to address deficits as defined above and maximize level of functional independent mobility. From PT/mobility standpoint, recommendation at time of d/c would be home with home health rehabilitation pending progress in order to facilitate return to PLOF. Barriers to Discharge Inaccessible home environment   Goals   Patient Goals to return home   STG Expiration Date 10/22/23   Short Term Goal #1 In 7-10 days: Increase bilateral LE strength 1/2 grade to facilitate independent mobility, Perform all bed mobility tasks modified independent to decrease caregiver burden, Perform all transfers modified independent to improve independence, Ambulate > 50 ft. with least restrictive assistive device with distant S w/o LOB and w/ normalized gait pattern 100% of the time, Increase all balance 1/2 grade to decrease risk for falls, Improve Barthel Index score to 70 or greater to facilitate independence, and PT provider will perform functional balance assessment to determine fall risk   PT Treatment Day 0   Plan   Treatment/Interventions Functional transfer training;LE strengthening/ROM; Therapeutic exercise; Endurance training;Patient/family training;Equipment eval/education;Gait training;Bed mobility;Spoke to nursing;OT   PT Frequency 2-3x/wk   Discharge Recommendation   PT Discharge Recommendation Home with home health rehabilitation   Equipment Recommended Walker  (RW)   AM-PAC Basic Mobility Inpatient   Turning in Flat Bed Without Bedrails 3   Lying on Back to Sitting on Edge of Flat Bed Without Bedrails 3   Moving Bed to Chair 3   Standing Up From Chair Using Arms 3   Walk in Room 3   Climb 3-5 Stairs With Railing 2   Basic Mobility Inpatient Raw Score 17   Basic Mobility Standardized Score 39.67   Highest Level Of Mobility   -[facility] Goal 5: Stand one or more mins   -HLM Achieved 6: Walk 10 steps or more   Modified [place] Scale   Modified [place] Scale 4   Barthel Index   Feeding 10   Bathing 0   Grooming Score 5   Dressing Score 5   Bladder Score 10   Bowels Score 10   Toilet Use Score 5   Transfers (Bed/Chair) Score 10   Mobility (Level Surface) Score 0   Stairs Score 0   Barthel Index Score 55           Alpa Guerrero, PT, DPT

## 2023-10-12 NOTE — PROGRESS NOTES
[address]  Progress Note  Name: Socorro Patrick  MRN: [id number]  Unit/Bed#: -01 I Date of Admission: 10/11/2023   Date of Service: 10/12/2023 I Hospital Day: 0    Assessment/Plan   * Shortness of breath  Assessment & Plan  Patient presented to the ED with complaints of worsening shortness of breath x 1 week. BNP 48.  CT imaging negative for volume overload, BNP negative. Procalcitonin negative. Covid/Flu/RSV Negative  CT Chest (10/11/23): Unremarkable CT scan of the chest with only minor compressive atelectasis at the left lung base due to an elevated left hemidiaphragm   Echo ordered, complete today   If Negative, discharge home  Currently on  RA, %      Chronic respiratory failure with hypoxia ([address])  Assessment & Plan  On baseline 3 L. Currently at baseline. Chronic obstructive pulmonary disease, unspecified COPD type ([address])  Assessment & Plan  History of COPD on baseline 3 L oxygen. Not appearing in exacerbation currently. Stage 4 chronic kidney disease (HCC)  Assessment & Plan  History of CKD stg 4; with baseline creatinine likely around 2.2-2.5, no updated labs this year. Creatinine noted to be 2.68 on admission, noted to be 2.76  Follow up with Nephrology on discharge  UA  US Kidney/bladder pending for today    Primary hyperaldosteronism [facility])  Assessment & Plan  Continue Aldactone    Type 2 diabetes mellitus without complication, with long-term current use of insulin (HCC)  Assessment & Plan  Chronic, controlled, as evidenced by a hemoglobin A1C of 6.5  Continue with insulin sliding scale  Continue home dose of Lantus 40 units. Monitor blood glucose AC/HS    Essential hypertension  Assessment & Plan  Continue amlodipine, Aldactone           VTE Pharmacologic Prophylaxis:   Moderate Risk (Score 3-4) - Pharmacological DVT Prophylaxis Ordered: heparin. Patient Centered Rounds: I performed bedside rounds with nursing staff today.   Discussions with Specialists or Other Care Team Provider: Case Management    Education and Discussions with Family / Patient: Patient declined call to . Total Time Spent on Date of Encounter in care of patient: 42 mins. This time was spent on one or more of the following: performing physical exam; counseling and coordination of care; obtaining or reviewing history; documenting in the medical record; reviewing/ordering tests, medications or procedures; communicating with other healthcare professionals and discussing with patient's family/caregivers. Current Length of Stay: 0 day(s)  Current Patient Status: Observation   Certification Statement: The patient will continue to require additional inpatient hospital stay due to ongoing treatment in setting of  tightness with breathing, concern for possible COPD exacerbation. Discharge Plan: Anticipate discharge in 24-48 hrs to home. Code Status: Level 1 - Full Code    Subjective:   Patient resting in recliner chair, denies complaints of chest pain and reports he is not having significant shortness of breath, however, does note some tightness and some feeling of burning with coughing. Discussed with patient that we can trial Solu-Medrol, Protonix as he may have some acid reflux that is precipitating these feelings. Patient feels like he is not ready to go home, as his breathing really is unchanged since coming    Objective:     Vitals:   Temp (24hrs), Av.5 °F (36.9 °C), Min:98.4 °F (36.9 °C), Max:98.6 °F (37 °C)    Temp:  [98.4 °F (36.9 °C)-98.6 °F (37 °C)] 98.4 °F (36.9 °C)  HR:  [] 75  Resp:  [17-25] 17  BP: (116-145)/(62-95) 123/69  SpO2:  [90 %-100 %] 95 %  Body mass index is 37.36 kg/m². Input and Output Summary (last 24 hours):      Intake/Output Summary (Last 24 hours) at 10/12/2023 0954  Last data filed at 10/12/2023 0919  Gross per 24 hour   Intake 86.94 ml   Output 2250 ml   Net -2163.06 ml       Physical Exam:   Physical Exam  Vitals and nursing note reviewed. Constitutional:       General: He is not in acute distress. Appearance: He is ill-appearing. Cardiovascular:      Rate and Rhythm: Normal rate. Pulses: Normal pulses. Pulmonary:      Effort: Pulmonary effort is normal. No tachypnea, bradypnea or respiratory distress. Breath sounds: Decreased breath sounds (bilateral bases) present. Comments: RA 95%, dry cough  Abdominal:      General: Bowel sounds are normal.      Palpations: Abdomen is soft. Musculoskeletal:         General: Normal range of motion. Right lower leg: No edema. Left lower leg: No edema. Skin:     General: Skin is warm and dry. Neurological:      Mental Status: He is alert and oriented to person, place, and time.    Psychiatric:         Mood and Affect: Mood normal.          Additional Data:     Labs:  Results from last 7 days   Lab Units 10/12/23  0455 10/11/23  1605   WBC Thousand/uL 5.26 6.55   HEMOGLOBIN g/dL 9.7* 10.5*   HEMATOCRIT % 31.4* 33.6*   PLATELETS Thousands/uL 185 207   NEUTROS PCT %  --  69   LYMPHS PCT %  --  16   MONOS PCT %  --  8   EOS PCT %  --  5     Results from last 7 days   Lab Units 10/12/23  0455 10/11/23  1605   SODIUM mmol/L 139 139   POTASSIUM mmol/L 4.1 4.4   CHLORIDE mmol/L 107 107   CO2 mmol/L 24 23   BUN mg/dL 39* 37*   CREATININE mg/dL 2.76* 2.68*   ANION GAP mmol/L 8 9   CALCIUM mg/dL 9.6 9.7   ALBUMIN g/dL  --  4.4   TOTAL BILIRUBIN mg/dL  --  0.70   ALK PHOS U/L  --  69   ALT U/L  --  15   AST U/L  --  14   GLUCOSE RANDOM mg/dL 163* 84     Results from last 7 days   Lab Units 10/11/23  1639   INR  1.11     Results from last 7 days   Lab Units 10/12/23  0753 10/11/23  2311   POC GLUCOSE mg/dl 121 134         Results from last 7 days   Lab Units 10/11/23  1639   PROCALCITONIN ng/ml 0.13       Lines/Drains:  Invasive Devices       Peripheral Intravenous Line  Duration             Peripheral IV 10/11/23 Left Antecubital <1 day    Peripheral IV 10/11/23 Right Antecubital <1 day                          Imaging: Reviewed radiology reports from this admission including: chest xray and chest CT scan    Recent Cultures (last 7 days):         Last 24 Hours Medication List:   Current Facility-Administered Medications   Medication Dose Route Frequency Provider Last Rate    acetaminophen  650 mg Oral Q6H PRN Jason Henriquez MD      albuterol  2.5 mg Nebulization Q6H PRN Jason Henriquez MD      amLODIPine  10 mg Oral Daily Jason Henriquez MD      aspirin  81 mg Oral Daily Jason Henriquez MD      atorvastatin  20 mg Oral Daily With Dinner Jason Henriquez MD      brimonidine tartrate  1 drop Both Eyes TID Jason Henriquez MD      budesonide-formoterol  2 puff Inhalation BID Jason Henriquez MD      clonazePAM  0.5 mg Oral Daily PRN Jason Henriquez MD      cloNIDine  0.1 mg Oral BID Jason Henriquez MD      Diclofenac Sodium  2 g Topical 4x Daily Emily Watts MD      gabapentin  300 mg Oral BID Jason Henriquez MD      glycerin-hypromellose-  1 drop Both Eyes Q6H PRN Jason Henriquez MD      heparin (porcine)  5,000 Units Subcutaneous Q8H [facility] Emily Watts MD      insulin glargine  40 Units Subcutaneous HS Emily Watts MD      insulin lispro  1-6 Units Subcutaneous TID AC Jason Henriquez MD      ketotifen  1 drop Both Eyes BID Jason Henriquez MD      latanoprost  1 drop Both Eyes HS Emily Watts MD      ondansetron  4 mg Intravenous Q6H PRN Jason Henriquez MD      PARoxetine  30 mg Oral QAM Jason Henriquez MD      prednisoLONE acetate  1 drop Both Eyes HS Jason Henriquez MD      spironolactone  25 mg Oral Daily Jason Henriquez MD      traZODone  50 mg Oral HS Jason Henriquez MD          Today, Patient Was Seen By: MARS Velasquez    **Please Note: This note may have been constructed using a voice recognition system. **

## 2023-10-12 NOTE — UTILIZATION REVIEW
Initial Clinical Review  Observation on 10/11 @ 1911 upgraded to Inpatient on 10/12 @ 1130 d/t continued management of SOB. Admission: Date/Time/Statement:   Admission Orders (From admission, onward)       Ordered        10/12/23 1130  Inpatient Admission  Once            10/11/23 1911  Place in Observation  Once                          Orders Placed This Encounter   Procedures    Inpatient Admission     Standing Status:   Standing     Number of Occurrences:   1     Order Specific Question:   Level of Care     Answer:   Med Surg [16]     Order Specific Question:   Estimated length of stay     Answer:   More than 2 Midnights     Order Specific Question:   Certification     Answer:   I certify that inpatient services are medically necessary for this patient for a duration of greater than two midnights. See H&P and MD Progress Notes for additional information about the patient's course of treatment. ED Arrival Information       Expected   -    Arrival   10/11/2023 15:47    Acuity   Emergent              Means of arrival   Walk-In    Escorted by   Self    Service   Hospitalist    Admission type   Emergency              Arrival complaint   Palpitations,Cough             Chief Complaint   Patient presents with    Shortness of Breath     Patient reports productive cough for one week with shortness of breath unrelieved by home nebulizer treatments and sensations of "heart palpitations" while walking. Initial Presentation: 70 y.o. male w/ PMH of PTSD, hypertension, diabetes, CKD, COPD, chronic respiratory failure on 3 L baseline O2 presented to the ED form home w/ worsening SOB x 1 week. Pt reports orthopnea. Has cough. In the ED, Imaging showing mild vascular congestion. On exam, Crackles noted bilaterally diffuse, b/l LE edema present. Given nebs, IV Heparin, started on Hep gtt. Admit as observation level of care for SOB. Plan: IV Lasix 40 mg twice daily. Monitor renal function.  Daily weight, I's and O's, low-salt diet    Date: 10/12   Day 2: Obs to IP  IM Notes: Pt reports some chest tightness and some feeling of burning with coughing. On exam, decreased breath sounds, dry cough present. On 3L NC. F/u echo. Cont Xopenex prn. Cont symbicort. . Started on IV Solu Medrol. IV PPI. IV Lasix. Date: 10/13 Day 3: Has surpassed a 2nd midnight with active treatments and services, which include cont to mon resp status, cont IV Solu Medrol, IV PPI. Cont to mon labs. Inhaler.      ED Triage Vitals   Temperature Pulse Respirations Blood Pressure SpO2   10/11/23 1558 10/11/23 1558 10/11/23 1558 10/11/23 1558 10/11/23 1558   98.6 °F (37 °C) (!) 106 18 145/76 96 %      Temp src Heart Rate Source Patient Position - Orthostatic VS BP Location FiO2 (%)   -- 10/11/23 1558 10/11/23 1730 10/11/23 1730 --    Monitor Sitting Left arm       Pain Score       10/11/23 2000       No Pain          Wt Readings from Last 1 Encounters:   10/12/23 108 kg (238 lb)     Additional Vital Signs:   Date/Time Temp Pulse Resp BP MAP (mmHg) SpO2 O2 Device O2 Interface Device Patient Position - Orthostatic VS   10/12/23 1327 -- 85 18 -- -- 94 % -- -- --   10/12/23 1212 -- 75 -- 123/69 -- -- -- -- --   10/12/23 0804 -- -- -- -- -- 93 % -- -- --   10/12/23 07:54:38 -- 75 -- 123/69 87 95 % -- -- --   10/12/23 0406 -- -- -- -- -- -- -- Face mask --   10/12/23 02:58:34 -- 80 -- 120/74 89 100 % -- -- --   10/12/23 0012 -- -- -- -- -- -- -- Face mask --   10/11/23 2300 -- 93 -- 126/74 91 94 % -- -- --   10/11/23 2251 -- -- -- 126/74 -- -- -- -- --   10/11/23 21:50:45 -- 98 17 125/79 94 92 % -- -- --   10/11/23 20:15:49 98.4 °F (36.9 °C) 95 18 130/95 107 90 % -- -- --   10/11/23 2000 -- -- -- -- -- -- None (Room air) -- --   10/11/23 1930 -- 98 21 126/62 87 95 % -- -- --   10/11/23 1900 -- 97 18 127/79 97 95 % None (Room air) -- Sitting   10/11/23 1830 -- 100 25 Abnormal  123/71 90 95 % -- -- --   10/11/23 1800 -- 103 24 Abnormal  116/69 85 95 % -- -- -- 10/11/23 1730 -- 103 20 133/82 102 95 % None (Room air) -- Sitting   10/11/23 1700 -- 101 18 122/78 93 97 % -- -- --   10/11/23 1615 -- 103 23 Abnormal  138/82 103 98 % -- -- --       Pertinent Labs/Diagnostic Test Results:   US kidney and bladder   Final Result by Sanjeev Florez MD (10/12 1222)      1. No hydronephrosis. 2.  Indeterminate hypoechoic right lower pole renal lesion which could be due to a cyst with internal debris versus a solid neoplasm. Recommend further characterization with contrast-enhanced CT or MRI. The study was marked in EPIC for significant notification. Workstation performed: [id number]         CT chest without contrast   Final Result by Rosalind Stoll MD (10/11 1806)      Unremarkable CT scan of the chest with only minor compressive atelectasis at the left lung base due to an elevated left hemidiaphragm. Workstation performed: [id number]         XR chest 1 view portable   Final Result by Rosalind Stoll MD (10/11 1806)      No acute cardiopulmonary disease. Workstation performed: [id number]             10/11 EKG result:Normal sinus rhythm  Nonspecific T wave abnormality  Prolonged QT    10/12 ECHO:    Left Ventricle: Left ventricular cavity size is normal. Wall thickness is mildly increased. The left ventricular ejection fraction is 60%. Systolic function is normal. Wall motion is normal. Diastolic function is mildly abnormal, consistent with grade I (abnormal) relaxation. Left Atrium: The atrium is mildly dilated. Right Atrium: The atrium is mildly dilated. Mitral Valve: There is annular calcification. Aorta: The aortic root is normal in size. The ascending aorta is mildly dilated. The aortic root exhibited mild fibrocalcific change. The ascending aorta is 4.3 cm.      Results from last 7 days   Lab Units 10/11/23  1605   SARS-COV-2  Negative     Results from last 7 days   Lab Units 10/12/23  0455 10/11/23  1605   WBC Thousand/uL 5. 26 6.55   HEMOGLOBIN g/dL 9.7* 10.5*   HEMATOCRIT % 31.4* 33.6*   PLATELETS Thousands/uL 185 207   NEUTROS ABS Thousands/µL  --  4.57         Results from last 7 days   Lab Units 10/12/23  0455 10/11/23  1639 10/11/23  1605   SODIUM mmol/L 139  --  139   POTASSIUM mmol/L 4.1  --  4.4   CHLORIDE mmol/L 107  --  107   CO2 mmol/L 24  --  23   ANION GAP mmol/L 8  --  9   BUN mg/dL 39*  --  37*   CREATININE mg/dL 2.76*  --  2.68*   EGFR ml/min/1.73sq m 21  --  22   CALCIUM mg/dL 9.6  --  9.7   MAGNESIUM mg/dL  --  1.9  --      Results from last 7 days   Lab Units 10/11/23  1605   AST U/L 14   ALT U/L 15   ALK PHOS U/L 69   TOTAL PROTEIN g/dL 7.9   ALBUMIN g/dL 4.4   TOTAL BILIRUBIN mg/dL 0.70     Results from last 7 days   Lab Units 10/12/23  1540 10/12/23  1114 10/12/23  0753 10/11/23  2311   POC GLUCOSE mg/dl 232* 167* 121 134     Results from last 7 days   Lab Units 10/12/23  0455 10/11/23  1605   GLUCOSE RANDOM mg/dL 163* 84             Results from last 7 days   Lab Units 10/11/23  1948 10/11/23  1842 10/11/23  1605   HS TNI 0HR ng/L  --   --  5   HS TNI 2HR ng/L  --  5  --    HSTNI D2 ng/L  --  0  --    HS TNI 4HR ng/L 4  --   --    HSTNI D4 ng/L -1  --   --          Results from last 7 days   Lab Units 10/11/23  1639   PROTIME seconds 15.0*   INR  1.11   PTT seconds 38*     Results from last 7 days   Lab Units 10/11/23  1639   TSH 3RD GENERATON uIU/mL 0.726     Results from last 7 days   Lab Units 10/11/23  1639   PROCALCITONIN ng/ml 0.13                 Results from last 7 days   Lab Units 10/11/23  1639   BNP pg/mL 48             Results from last 7 days   Lab Units 10/11/23  1605   INFLUENZA A PCR  Negative   INFLUENZA B PCR  Negative   RSV PCR  Negative       ED Treatment:   Medication Administration from 10/11/2023 1547 to 10/11/2023 2009         Date/Time Order Dose Route Action     10/11/2023 1642 EDT ipratropium-albuterol (DUO-NEB) 0.5-2.5 mg/3 mL inhalation solution 3 mL 3 mL Nebulization Given 10/11/2023 1819 EDT heparin (porcine) injection 8,400 Units 8,400 Units Intravenous Given     10/11/2023 1819 EDT heparin (porcine) 25,000 units in 0.45% NaCl 250 mL infusion (premix) 18 Units/kg/hr Intravenous New Bag          Past Medical History:   Diagnosis Date    Cancer ([address] )     PROSTATE CANCER    Colon polyp     Hyperlipidemia     PTSD (post-traumatic stress disorder)     Type 2 diabetes mellitus without complication, with long-term current use of insulin ([facility]) 5/29/2020    Type 2 diabetes mellitus without complication, with long-term current use of insulin ([address]) 5/29/2020     Present on Admission:   Shortness of breath   Essential hypertension   Stage 4 chronic kidney disease (HCC)   Malignant neoplasm of lower lobe of left lung (HCC)   Chronic obstructive pulmonary disease, unspecified COPD type ([facility])   Primary hyperaldosteronism (HCC)   Chronic respiratory failure with hypoxia (HCC)      Admitting Diagnosis: SOB (shortness of breath) [R06.02]  Age/Sex: 70 y.o. male  Admission Orders:  SCD  PT/OT  Daily wts  I/O    Scheduled Medications:  amLODIPine, 10 mg, Oral, Daily  aspirin, 81 mg, Oral, Daily  atorvastatin, 20 mg, Oral, Daily With Dinner  brimonidine tartrate, 1 drop, Both Eyes, TID  budesonide-formoterol, 2 puff, Inhalation, BID  cloNIDine, 0.1 mg, Oral, BID  Diclofenac Sodium, 2 g, Topical, 4x Daily  furosemide (LASIX) injection 40 mg IV BID  gabapentin, 300 mg, Oral, BID  heparin (porcine), 5,000 Units, Subcutaneous, Q8H EDDIE  insulin glargine, 40 Units, Subcutaneous, HS  insulin lispro, 1-6 Units, Subcutaneous, TID AC  ketotifen, 1 drop, Both Eyes, BID  latanoprost, 1 drop, Both Eyes, HS  methylPREDNISolone sodium succinate, 40 mg, Intravenous, Q8H EDDIE  pantoprazole, 40 mg, Intravenous, Q12H EDDIE  PARoxetine, 30 mg, Oral, QAM  prednisoLONE acetate, 1 drop, Both Eyes, HS  spironolactone, 25 mg, Oral, Daily  traZODone, 50 mg, Oral, HS      Continuous IV Infusions:  heparin (porcine) 25,000 units in 0.45% NaCl 250 mL infusion (premix)  Rate: 3.2-31.5 mL/hr Dose: 3-30 Units/kg/hr  Weight Dosing Info: 105 kg (Order-Specific)  Freq: Titrated Route: IV  Last Dose: Stopped (10/11/23 2255)  Start: 10/11/23 1645 End: 10/11/23 2200      PRN Meds:  acetaminophen, 650 mg, Oral, Q6H PRN  benzonatate, 100 mg, Oral, TID PRN 10/12 x 1  clonazePAM, 0.5 mg, Oral, Daily PRN 10/11 x 1  dextromethorphan-guaiFENesin, 10 mL, Oral, Q4H PRN 10/12 x 1  glycerin-hypromellose-, 1 drop, Both Eyes, Q6H PRN  levalbuterol, 1.25 mg, Nebulization, Q8H PRN 10/12 x 2  ondansetron, 4 mg, Intravenous, Q6H PRN        None    Network Utilization Review Department  ATTENTION: Please call with any questions or concerns to [phone] and carefully listen to the prompts so that you are directed to the right person. All voicemails are confidential.   For Discharge needs, contact Care Management DC Support Team at [phone] opt. 2  Send all requests for admission clinical reviews, approved or denied determinations and any other requests to dedicated fax number below belonging to the campus where the patient is receiving treatment.  List of dedicated fax numbers for the Facilities:  [place] DENIALS (Administrative/Medical Necessity) [phone]   DISCHARGE SUPPORT TEAM (NETWORK) [phone]   [facility] (Maternity/NICU/Pediatrics) [phone]   [address] [place] [address] Drive [phone]   [facility] [address] [facility] [phone] [address] [facility] [address] [address] [facility] 487 [facility] [address] [place] [facility] [phone]

## 2023-10-12 NOTE — ASSESSMENT & PLAN NOTE
Chronic, controlled, as evidenced by a hemoglobin A1C of 6.5  Continue with insulin sliding scale  Continue home dose of Lantus 40 units.   Monitor blood glucose AC/HS

## 2023-10-13 ENCOUNTER — APPOINTMENT (INPATIENT)
Dept: RADIOLOGY | Facility: HOSPITAL | Age: 78
DRG: 191 | End: 2023-10-13
Payer: COMMERCIAL

## 2023-10-13 LAB
ANION GAP SERPL CALCULATED.3IONS-SCNC: 10 MMOL/L
BUN SERPL-MCNC: 46 MG/DL (ref 5–25)
CALCIUM SERPL-MCNC: 9.4 MG/DL (ref 8.4–10.2)
CHLORIDE SERPL-SCNC: 99 MMOL/L (ref 96–108)
CO2 SERPL-SCNC: 23 MMOL/L (ref 21–32)
CREAT SERPL-MCNC: 2.95 MG/DL (ref 0.6–1.3)
CREAT UR-MCNC: 75.7 MG/DL
ERYTHROCYTE [DISTWIDTH] IN BLOOD BY AUTOMATED COUNT: 14.7 % (ref 11.6–15.1)
GFR SERPL CREATININE-BSD FRML MDRD: 19 ML/MIN/1.73SQ M
GLUCOSE SERPL-MCNC: 281 MG/DL (ref 65–140)
GLUCOSE SERPL-MCNC: 291 MG/DL (ref 65–140)
GLUCOSE SERPL-MCNC: 300 MG/DL (ref 65–140)
GLUCOSE SERPL-MCNC: 310 MG/DL (ref 65–140)
GLUCOSE SERPL-MCNC: 313 MG/DL (ref 65–140)
HCT VFR BLD AUTO: 31.4 % (ref 36.5–49.3)
HGB BLD-MCNC: 9.8 G/DL (ref 12–17)
MAGNESIUM SERPL-MCNC: 1.9 MG/DL (ref 1.9–2.7)
MCH RBC QN AUTO: 27.2 PG (ref 26.8–34.3)
MCHC RBC AUTO-ENTMCNC: 31.2 G/DL (ref 31.4–37.4)
MCV RBC AUTO: 87 FL (ref 82–98)
OSMOLALITY UR: 355 MMOL/KG
PLATELET # BLD AUTO: 178 THOUSANDS/UL (ref 149–390)
PMV BLD AUTO: 11.4 FL (ref 8.9–12.7)
POTASSIUM SERPL-SCNC: 4.8 MMOL/L (ref 3.5–5.3)
PROCALCITONIN SERPL-MCNC: 0.1 NG/ML
RBC # BLD AUTO: 3.6 MILLION/UL (ref 3.88–5.62)
SODIUM 24H UR-SCNC: 34 MOL/L
SODIUM SERPL-SCNC: 132 MMOL/L (ref 135–147)
WBC # BLD AUTO: 5.75 THOUSAND/UL (ref 4.31–10.16)

## 2023-10-13 PROCEDURE — 83735 ASSAY OF MAGNESIUM: CPT | Performed by: NURSE PRACTITIONER

## 2023-10-13 PROCEDURE — 85027 COMPLETE CBC AUTOMATED: CPT | Performed by: NURSE PRACTITIONER

## 2023-10-13 PROCEDURE — 82948 REAGENT STRIP/BLOOD GLUCOSE: CPT

## 2023-10-13 PROCEDURE — 71045 X-RAY EXAM CHEST 1 VIEW: CPT

## 2023-10-13 PROCEDURE — 84145 PROCALCITONIN (PCT): CPT | Performed by: NURSE PRACTITIONER

## 2023-10-13 PROCEDURE — 84300 ASSAY OF URINE SODIUM: CPT | Performed by: NURSE PRACTITIONER

## 2023-10-13 PROCEDURE — 94760 N-INVAS EAR/PLS OXIMETRY 1: CPT

## 2023-10-13 PROCEDURE — 92610 EVALUATE SWALLOWING FUNCTION: CPT

## 2023-10-13 PROCEDURE — 82570 ASSAY OF URINE CREATININE: CPT | Performed by: NURSE PRACTITIONER

## 2023-10-13 PROCEDURE — 80048 BASIC METABOLIC PNL TOTAL CA: CPT | Performed by: NURSE PRACTITIONER

## 2023-10-13 PROCEDURE — C9113 INJ PANTOPRAZOLE SODIUM, VIA: HCPCS | Performed by: NURSE PRACTITIONER

## 2023-10-13 PROCEDURE — 83935 ASSAY OF URINE OSMOLALITY: CPT | Performed by: NURSE PRACTITIONER

## 2023-10-13 PROCEDURE — 94640 AIRWAY INHALATION TREATMENT: CPT

## 2023-10-13 PROCEDURE — 99233 SBSQ HOSP IP/OBS HIGH 50: CPT | Performed by: NURSE PRACTITIONER

## 2023-10-13 PROCEDURE — 94660 CPAP INITIATION&MGMT: CPT

## 2023-10-13 PROCEDURE — 94664 DEMO&/EVAL PT USE INHALER: CPT

## 2023-10-13 RX ORDER — DOCUSATE SODIUM 100 MG/1
100 CAPSULE, LIQUID FILLED ORAL 2 TIMES DAILY
Status: DISCONTINUED | OUTPATIENT
Start: 2023-10-13 | End: 2023-10-18 | Stop reason: HOSPADM

## 2023-10-13 RX ORDER — METHYLPREDNISOLONE SODIUM SUCCINATE 40 MG/ML
40 INJECTION, POWDER, LYOPHILIZED, FOR SOLUTION INTRAMUSCULAR; INTRAVENOUS EVERY 12 HOURS SCHEDULED
Status: DISCONTINUED | OUTPATIENT
Start: 2023-10-13 | End: 2023-10-14

## 2023-10-13 RX ORDER — GUAIFENESIN 600 MG/1
600 TABLET, EXTENDED RELEASE ORAL EVERY 12 HOURS SCHEDULED
Status: DISCONTINUED | OUTPATIENT
Start: 2023-10-13 | End: 2023-10-18 | Stop reason: HOSPADM

## 2023-10-13 RX ADMIN — BUDESONIDE AND FORMOTEROL FUMARATE DIHYDRATE 2 PUFF: 160; 4.5 AEROSOL RESPIRATORY (INHALATION) at 22:14

## 2023-10-13 RX ADMIN — METHYLPREDNISOLONE SODIUM SUCCINATE 40 MG: 40 INJECTION, POWDER, FOR SOLUTION INTRAMUSCULAR; INTRAVENOUS at 22:19

## 2023-10-13 RX ADMIN — INSULIN LISPRO 4 UNITS: 100 INJECTION, SOLUTION INTRAVENOUS; SUBCUTANEOUS at 22:15

## 2023-10-13 RX ADMIN — BRIMONIDINE TARTRATE 1 DROP: 2 SOLUTION/ DROPS OPHTHALMIC at 16:44

## 2023-10-13 RX ADMIN — INSULIN LISPRO 5 UNITS: 100 INJECTION, SOLUTION INTRAVENOUS; SUBCUTANEOUS at 11:58

## 2023-10-13 RX ADMIN — PANTOPRAZOLE SODIUM 40 MG: 40 INJECTION, POWDER, FOR SOLUTION INTRAVENOUS at 22:19

## 2023-10-13 RX ADMIN — ATORVASTATIN CALCIUM 20 MG: 20 TABLET, FILM COATED ORAL at 17:26

## 2023-10-13 RX ADMIN — GUAIFENESIN 600 MG: 600 TABLET ORAL at 09:44

## 2023-10-13 RX ADMIN — TRAZODONE HYDROCHLORIDE 50 MG: 50 TABLET ORAL at 22:14

## 2023-10-13 RX ADMIN — BRIMONIDINE TARTRATE 1 DROP: 2 SOLUTION/ DROPS OPHTHALMIC at 08:11

## 2023-10-13 RX ADMIN — LATANOPROST 1 DROP: 50 SOLUTION OPHTHALMIC at 22:14

## 2023-10-13 RX ADMIN — SODIUM CHLORIDE 250 ML: 0.9 INJECTION, SOLUTION INTRAVENOUS at 14:35

## 2023-10-13 RX ADMIN — HEPARIN SODIUM 5000 UNITS: 5000 INJECTION INTRAVENOUS; SUBCUTANEOUS at 22:14

## 2023-10-13 RX ADMIN — PANTOPRAZOLE SODIUM 40 MG: 40 INJECTION, POWDER, FOR SOLUTION INTRAVENOUS at 08:06

## 2023-10-13 RX ADMIN — GUAIFENESIN AND DEXTROMETHORPHAN 10 ML: 100; 10 SYRUP ORAL at 08:15

## 2023-10-13 RX ADMIN — METHYLPREDNISOLONE SODIUM SUCCINATE 40 MG: 40 INJECTION, POWDER, FOR SOLUTION INTRAMUSCULAR; INTRAVENOUS at 05:37

## 2023-10-13 RX ADMIN — CLONIDINE HYDROCHLORIDE 0.1 MG: 0.1 TABLET ORAL at 17:26

## 2023-10-13 RX ADMIN — SPIRONOLACTONE 25 MG: 25 TABLET, COATED ORAL at 08:05

## 2023-10-13 RX ADMIN — KETOTIFEN FUMARATE 1 DROP: 0.25 SOLUTION/ DROPS OPHTHALMIC at 17:28

## 2023-10-13 RX ADMIN — GABAPENTIN 300 MG: 300 CAPSULE ORAL at 17:26

## 2023-10-13 RX ADMIN — AMLODIPINE BESYLATE 10 MG: 10 TABLET ORAL at 08:05

## 2023-10-13 RX ADMIN — GUAIFENESIN 600 MG: 600 TABLET ORAL at 22:14

## 2023-10-13 RX ADMIN — PREDNISOLONE ACETATE 1 DROP: 10 SUSPENSION/ DROPS OPHTHALMIC at 22:14

## 2023-10-13 RX ADMIN — HEPARIN SODIUM 5000 UNITS: 5000 INJECTION INTRAVENOUS; SUBCUTANEOUS at 14:35

## 2023-10-13 RX ADMIN — INSULIN GLARGINE 40 UNITS: 100 INJECTION, SOLUTION SUBCUTANEOUS at 22:16

## 2023-10-13 RX ADMIN — GABAPENTIN 300 MG: 300 CAPSULE ORAL at 08:05

## 2023-10-13 RX ADMIN — BUDESONIDE AND FORMOTEROL FUMARATE DIHYDRATE 2 PUFF: 160; 4.5 AEROSOL RESPIRATORY (INHALATION) at 08:12

## 2023-10-13 RX ADMIN — CLONAZEPAM 0.5 MG: 0.5 TABLET ORAL at 22:26

## 2023-10-13 RX ADMIN — DOCUSATE SODIUM 100 MG: 100 CAPSULE, LIQUID FILLED ORAL at 17:26

## 2023-10-13 RX ADMIN — INSULIN LISPRO 5 UNITS: 100 INJECTION, SOLUTION INTRAVENOUS; SUBCUTANEOUS at 08:08

## 2023-10-13 RX ADMIN — ASPIRIN 81 MG: 81 TABLET, COATED ORAL at 08:05

## 2023-10-13 RX ADMIN — INSULIN LISPRO 4 UNITS: 100 INJECTION, SOLUTION INTRAVENOUS; SUBCUTANEOUS at 17:26

## 2023-10-13 RX ADMIN — HEPARIN SODIUM 5000 UNITS: 5000 INJECTION INTRAVENOUS; SUBCUTANEOUS at 05:36

## 2023-10-13 RX ADMIN — DICLOFENAC SODIUM 2 G: 10 GEL TOPICAL at 22:14

## 2023-10-13 RX ADMIN — KETOTIFEN FUMARATE 1 DROP: 0.25 SOLUTION/ DROPS OPHTHALMIC at 09:08

## 2023-10-13 RX ADMIN — BRIMONIDINE TARTRATE 1 DROP: 2 SOLUTION/ DROPS OPHTHALMIC at 22:14

## 2023-10-13 RX ADMIN — PAROXETINE HYDROCHLORIDE 30 MG: 20 TABLET, FILM COATED ORAL at 08:05

## 2023-10-13 RX ADMIN — CLONIDINE HYDROCHLORIDE 0.1 MG: 0.1 TABLET ORAL at 08:05

## 2023-10-13 NOTE — ASSESSMENT & PLAN NOTE
History of CKD stg 4; with baseline creatinine likely around 2.2-2.5, no updated labs this year.   Creatinine noted to be 2.68 on admission, continuing to rise, 2.95  Will check urine studies today  Urine creatinine, Urine sodium

## 2023-10-13 NOTE — SPEECH THERAPY NOTE
Speech-Language Pathology Bedside Swallow Evaluation        Patient Name: Amina Caceres    AVFDN'K Date: 10/13/2023     Problem List  Principal Problem:    Shortness of breath  Active Problems:    Essential hypertension    Type 2 diabetes mellitus without complication, with long-term current use of insulin (HCC)    Primary hyperaldosteronism (HCC)    Stage 4 chronic kidney disease (HCC)    Chronic obstructive pulmonary disease, unspecified COPD type ([address])    Malignant neoplasm of lower lobe of left lung (HCC)    Chronic respiratory failure with hypoxia ([address])         Past Medical History  Past Medical History:   Diagnosis Date    Cancer ([address])     PROSTATE CANCER    Colon polyp     Hyperlipidemia     PTSD (post-traumatic stress disorder)     Type 2 diabetes mellitus without complication, with long-term current use of insulin ([address]) 5/29/2020    Type 2 diabetes mellitus without complication, with long-term current use of insulin ([address]) 5/29/2020       Past Surgical History  Past Surgical History:   Procedure Laterality Date    APPENDECTOMY         Summary/Impressions:   Bedside observations support grossly intact oropharyngeal swallow function across all consistencies tested. Self-fed solid and liquid trials with no s/s of dysphagia or distress. Patient denies difficulties or changes from baseline function. Recommendations:   Diet: regular diet and thin liquids   Meds: whole with liquid   Feeding assistance: independent   Frequent Oral care: 2-4x/day  Aspiration precautions and compensatory swallowing strategies: upright posture  Other Recommendations/ considerations: No further ST follow-up; please reorder should pt status change or concerns arise      Current Medical Status  Pt is a 70 y.o. male who presented to [address] [facility] with  past history of lung cancer who presents to the emergency room today for shortness of breath. Patient is currently in remission since 5 months ago.   Patient reports shortness of breath that has been constant for 1 week and is worse today. Associated with productive cough, sore throat, chest tightness. Associated with palpitations that come and go, started 3 weeks ago and resolve on their own. Patient does not report palpitations today. Denies fevers, chills, headaches, dizziness, visual changes, congestion, chest pain, nausea, vomiting, abdominal pain, loss sensation, numbness/tingling, dysuria, flank pain, hematuria. Patient has been taking cough syrup, nebulizing treatments at home and Symbicort without relief. Patient has not tried anything today for symptoms. Patient reports history of a blood clot in his left arm 1 year ago. At this time, heart score 6. Given the above, orders placed for dysphagia evaluation. Past medical history:  Please see H&P for details    Special Studies:  CT chest 10/11/23:  Unremarkable CT scan of the chest with only minor compressive atelectasis at the left lung base due to an elevated left hemidiaphragm. Chest XR 10/11/23:  No acute cardiopulmonary disease. Social/Education/Vocational Hx:  Pt lives with family    Swallow Information   Current Risks for Dysphagia & Aspiration:  changes in respiratory status   Current Symptoms/Concerns: change in respiratory status  Current Diet: regular diet and thin liquids   Baseline Diet: regular diet and thin liquids    Baseline Assessment   Behavior/Cognition: alert  Speech/Language Status: able to participate in conversation  Patient Positioning: upright in bed    Swallow Mechanism Exam   Facial: symmetrical  Labial: WFL (subtle L-side weakness)   Lingual: WFL  Velum: symmetrical  Mandible: adequate ROM  Dentition: edentulous- dentures at home, pt eats without   Vocal quality:clear/adequate   Volitional Cough: strong/productive   Respiratory: RA    Consistencies Assessed and Performance   Consistencies Administered: thin liquids, nectar thick, puree, and hard solids    Oral Stage: WFL.  Patient presents with adequate bolus acceptance, containment and manipulation. Mastication judged to be efficient and complete. No oral residue. Pharyngeal Stage: Appears functional.  Laryngeal rise noted upon palpation. Swallow initiation appears timely. No overt s/s of aspiration or distress. Vocal quality remains clear and dry. Esophageal Concerns: none reported     Plan  No additional follow-up at this time. Please re-order should pt exhibit change in status or concerns arise.      Results Reviewed with: patient and Arline Zheng, [address] [facility], [address] [facility]  Speech-Language Pathologist  [place] #[id number]  [place] #[id number]

## 2023-10-13 NOTE — ASSESSMENT & PLAN NOTE
Patient presented to the ED with complaints of worsening shortness of breath x 1 week. BNP 48.  CT imaging negative for volume overload, BNP negative. Procalcitonin negative. Covid/Flu/RSV Negative  CT Chest (10/11/23): Unremarkable CT scan of the chest with only minor compressive atelectasis at the left lung base due to an elevated left hemidiaphragm   Echo (10/12/23): Left ventricular cavity size is normal. Wall thickness is mildly increased. The left ventricular ejection fraction is 60%. Systolic function is normal.  Wall motion is normal. Diastolic function is mildly abnormal, consistent with grade I (abnormal) relaxation.    Currently on  RA, %  Likely that shortness of breath related to COPD that was exacerbated by a virus

## 2023-10-13 NOTE — NURSING NOTE
Education provided to patient regarding incentive spirometer. Per patient, he feels mucus on the back of his throat but is unable to cough it up. Benefits of IS discussed such as lung expansion and airway clearance. Correct way to use IS explained. Patient is able to reach 750 at this time. Patient was instructed to use IS ten times every hour while awake. Patient currently in bed, call bell within reach.

## 2023-10-13 NOTE — NURSING NOTE
Per patient, he was taking a bite of jello and inhaled, causing him to cough and choke. Patient stated "I got short of breath and my vision got dark". SpO2 in 90's while room air, lung sounds diminished. SLIM informed.

## 2023-10-13 NOTE — PROGRESS NOTES
[address]  Progress Note  Name: Rigoberto Tobar  MRN: [id number]  Unit/Bed#: -01 I Date of Admission: 10/11/2023   Date of Service: 10/13/2023 I Hospital Day: 1    Assessment/Plan   * Shortness of breath  Assessment & Plan  Patient presented to the ED with complaints of worsening shortness of breath x 1 week. BNP 48.  CT imaging negative for volume overload, BNP negative. Procalcitonin negative. Covid/Flu/RSV Negative  CT Chest (10/11/23): Unremarkable CT scan of the chest with only minor compressive atelectasis at the left lung base due to an elevated left hemidiaphragm   Echo (10/12/23): Left ventricular cavity size is normal. Wall thickness is mildly increased. The left ventricular ejection fraction is 60%. Systolic function is normal.  Wall motion is normal. Diastolic function is mildly abnormal, consistent with grade I (abnormal) relaxation. Currently on  RA, %  Likely that shortness of breath related to COPD that was exacerbated by a virus    Chronic obstructive pulmonary disease, unspecified COPD type ([address])  Assessment & Plan  History of COPD on baseline 3 L oxygen. Not currently wheezing    Stage 4 chronic kidney disease ([address])  Assessment & Plan  History of CKD stg 4; with baseline creatinine likely around 2.2-2.5, no updated labs this year. Creatinine noted to be 2.68 on admission, continuing to rise, 2.95  Will check urine studies today  Urine creatinine, Urine sodium    Primary hyperaldosteronism ([address])  Assessment & Plan  Continue Aldactone    Type 2 diabetes mellitus without complication, with long-term current use of insulin ([facility])  Assessment & Plan  Chronic, controlled, as evidenced by a hemoglobin A1C of 6.5  Continue with insulin sliding scale  Continue home dose of Lantus 40 units.   Monitor blood glucose AC/HS    Essential hypertension  Assessment & Plan  Continue amlodipine, Aldactone           VTE Pharmacologic Prophylaxis:   High Risk (Score >/= 5) - Pharmacological DVT Prophylaxis Ordered: heparin. Sequential Compression Devices Ordered. Patient Centered Rounds: I performed bedside rounds with nursing staff today. Discussions with Specialists or Other Care Team Provider: Case Management    Education and Discussions with Family / Patient: Attempted to update  (wife) via phone. Unable to contact. Unable to get through, says number is not in service. Total Time Spent on Date of Encounter in care of patient: 26 mins. This time was spent on one or more of the following: performing physical exam; counseling and coordination of care; obtaining or reviewing history; documenting in the medical record; reviewing/ordering tests, medications or procedures; communicating with other healthcare professionals and discussing with patient's family/caregivers. Current Length of Stay: 1 day(s)  Current Patient Status: Inpatient   Certification Statement: The patient will continue to require additional inpatient hospital stay due to ongoing treatment in setting of shortness of breath, elevated creatinine today. Need for urine studies. Discharge Plan: Anticipate discharge tomorrow to home. Code Status: Level 1 - Full Code    Subjective:   Patient seen Kalyan Reynaldo in bed, reports still having a cough, but feels he cannot get any mucus up, although cough sounds dry. Denies complaints of chest pain, shortness of breath, fever, or chills. Objective:     Vitals:   Temp (24hrs), Av °F (36.7 °C), Min:97.7 °F (36.5 °C), Max:98.4 °F (36.9 °C)    Temp:  [97.7 °F (36.5 °C)-98.4 °F (36.9 °C)] 97.7 °F (36.5 °C)  HR:  [75-86] 79  Resp:  [16-18] 16  BP: (113-132)/(65-80) 113/65  SpO2:  [94 %-97 %] 94 %  Body mass index is 36.95 kg/m². Input and Output Summary (last 24 hours):      Intake/Output Summary (Last 24 hours) at 10/13/2023 1204  Last data filed at 10/13/2023 0800  Gross per 24 hour   Intake 300 ml   Output 1700 ml   Net -1400 ml       Physical Exam: Physical Exam  Vitals and nursing note reviewed. Constitutional:       General: He is not in acute distress. Appearance: He is ill-appearing. Cardiovascular:      Rate and Rhythm: Normal rate. Pulses: Normal pulses. Heart sounds: Normal heart sounds. Pulmonary:      Effort: Pulmonary effort is normal. No tachypnea, bradypnea or respiratory distress. Breath sounds: Decreased breath sounds present. Comments: RA  Abdominal:      General: Bowel sounds are normal.      Palpations: Abdomen is soft. Musculoskeletal:         General: Normal range of motion. Right lower leg: No edema. Left lower leg: No edema. Skin:     General: Skin is warm and dry. Neurological:      Mental Status: He is alert and oriented to person, place, and time. Psychiatric:         Mood and Affect: Mood normal.          Additional Data:     Labs:  Results from last 7 days   Lab Units 10/13/23  0611 10/12/23  0455 10/11/23  1605   WBC Thousand/uL 5.75   < > 6.55   HEMOGLOBIN g/dL 9.8*   < > 10.5*   HEMATOCRIT % 31.4*   < > 33.6*   PLATELETS Thousands/uL 178   < > 207   NEUTROS PCT %  --   --  69   LYMPHS PCT %  --   --  16   MONOS PCT %  --   --  8   EOS PCT %  --   --  5    < > = values in this interval not displayed. Results from last 7 days   Lab Units 10/13/23  0611 10/12/23  0455 10/11/23  1605   SODIUM mmol/L 132*   < > 139   POTASSIUM mmol/L 4.8   < > 4.4   CHLORIDE mmol/L 99   < > 107   CO2 mmol/L 23   < > 23   BUN mg/dL 46*   < > 37*   CREATININE mg/dL 2.95*   < > 2.68*   ANION GAP mmol/L 10   < > 9   CALCIUM mg/dL 9.4   < > 9.7   ALBUMIN g/dL  --   --  4.4   TOTAL BILIRUBIN mg/dL  --   --  0.70   ALK PHOS U/L  --   --  69   ALT U/L  --   --  15   AST U/L  --   --  14   GLUCOSE RANDOM mg/dL 300*   < > 84    < > = values in this interval not displayed.      Results from last 7 days   Lab Units 10/11/23  1639   INR  1.11     Results from last 7 days   Lab Units 10/13/23  1059 10/13/23  8656 10/12/23  2046 10/12/23  1840 10/12/23  1540 10/12/23  1114 10/12/23  0753 10/11/23  2311   POC GLUCOSE mg/dl 310* 313* 347* 228* 232* 167* 121 134         Results from last 7 days   Lab Units 10/13/23  0611 10/11/23  1639   PROCALCITONIN ng/ml 0.10 0.13       Lines/Drains:  Invasive Devices       Peripheral Intravenous Line  Duration             Peripheral IV 10/11/23 Left Antecubital 1 day                    Imaging: No pertinent imaging reviewed.     Recent Cultures (last 7 days):         Last 24 Hours Medication List:   Current Facility-Administered Medications   Medication Dose Route Frequency Provider Last Rate    acetaminophen  650 mg Oral Q6H PRN Lizzy Munguia MD      amLODIPine  10 mg Oral Daily Lizzy Munguia MD      aspirin  81 mg Oral Daily Lizzy Munguia MD      atorvastatin  20 mg Oral Daily With Dinner Lizzy Munguia MD      brimonidine tartrate  1 drop Both Eyes TID Lizzy Munguia MD      budesonide-formoterol  2 puff Inhalation BID Lizzy Munguia MD      clonazePAM  0.5 mg Oral Daily PRN Lizzy Munguia MD      cloNIDine  0.1 mg Oral BID Lizzy Munguia MD      dextromethorphan-guaiFENesin  10 mL Oral Q4H PRN MARS Tao      Diclofenac Sodium  2 g Topical 4x Daily Emily Urena MD      gabapentin  300 mg Oral BID Lizzy Munguia MD      glycerin-hypromellose-  1 drop Both Eyes Q6H PRN Lizzy Munguia MD      guaiFENesin  600 mg Oral Q12H [address] AliseMARS Martino      heparin (porcine)  5,000 Units Subcutaneous Q8H [address] Lizzy Munguia MD      insulin glargine  40 Units Subcutaneous HS mEily Urena MD      insulin lispro  1-6 Units Subcutaneous TID AC Emily Urena MD      insulin lispro  1-6 Units Subcutaneous HS Amparo Alejandre PA-C      ketotifen  1 drop Both Eyes BID Lizzy Munguia MD      latanoprost  1 drop Both Eyes HS Lizzy Munguia MD      levalbuterol  1.25 mg Nebulization Q8H PRN Zina Gottron, CRNP      methylPREDNISolone sodium succinate  40 mg Intravenous Q12H [address] Alise Vasu, MARS      ondansetron  4 mg Intravenous Q6H PRN Laura Sullivan MD      pantoprazole  40 mg Intravenous Q12H [address] Alise Vasu, MARS      PARoxetine  30 mg Oral QAM Laura Sullivan MD      prednisoLONE acetate  1 drop Both Eyes HS Laura Sullivan MD      spironolactone  25 mg Oral Daily Laura Sullivan MD      traZODone  50 mg Oral HS Laura Sullivan MD          Today, Patient Was Seen By: Zina Gottron, CRNP    **Please Note: This note may have been constructed using a voice recognition system. **

## 2023-10-13 NOTE — RESPIRATORY THERAPY NOTE
RT Protocol Note  Celina Chawla 70 y.o. male MRN: [id number]  Unit/Bed#: -01 Encounter: [id number]    Assessment    Principal Problem:    Shortness of breath  Active Problems:    Essential hypertension    Type 2 diabetes mellitus without complication, with long-term current use of insulin (HCC)    Primary hyperaldosteronism (HCC)    Stage 4 chronic kidney disease (HCC)    Chronic obstructive pulmonary disease, unspecified COPD type ([facility])    Malignant neoplasm of lower lobe of left lung (HCC)    Chronic respiratory failure with hypoxia ([facility])      Home Pulmonary Medications:    Home Devices/Therapy: BiPAP/CPAP, Home O2    Past Medical History:   Diagnosis Date    Cancer ([facility])     PROSTATE CANCER    Colon polyp     Hyperlipidemia     PTSD (post-traumatic stress disorder)     Type 2 diabetes mellitus without complication, with long-term current use of insulin ([address] [place] [address]) 2020    Type 2 diabetes mellitus without complication, with long-term current use of insulin ([facility]) 2020     Social History     Socioeconomic History    Marital status: /Civil Union     Spouse name: None    Number of children: None    Years of education: None    Highest education level: None   Occupational History    None   Tobacco Use    Smoking status: Never    Smokeless tobacco: Never   Vaping Use    Vaping Use: Never used   Substance and Sexual Activity    Alcohol use: Not Currently    Drug use: Never    Sexual activity: None   Other Topics Concern    None   Social History Narrative    Most recent tobacco use screenin-      Do you currently or have you served in the [address] [place] [address]:   Yes      If Yes, What branch of service:   [employer]      Were you activated, into active duty, as a member of the [employer] or as a Reservist:   No      Marital status:         Exercise level:    Moderate      Diet:   Regular      General stress level:   High      Alcohol intake:   None      Caffeine intake:   None      Seat belts used routinely:   Yes      Sunscreen used routinely:   Yes      Smoke alarm in home:   Yes      Advance directive:   Yes      Social Determinants of Health     Financial Resource Strain: High Risk (9/15/2023)    Overall Financial Resource Strain (CARDIA)     Difficulty of Paying Living Expenses: Hard   Food Insecurity: Not on file   Transportation Needs: No Transportation Needs (9/15/2023)    PRAPARE - Transportation     Lack of Transportation (Medical): No     Lack of Transportation (Non-Medical): No   Physical Activity: Not on file   Stress: Not on file   Social Connections: Not on file   Intimate Partner Violence: Not on file   Housing Stability: Not on file       Subjective         Objective    Physical Exam:   Assessment Type: Assess only  General Appearance: Alert, Awake  Respiratory Pattern: Normal  Chest Assessment: Chest expansion symmetrical  Bilateral Breath Sounds: Diminished, Expiratory wheezes  Cough: Strong, Dry, Non-productive  O2 Device: RA    Vitals:  Blood pressure 132/80, pulse 86, temperature 98.4 °F (36.9 °C), resp. rate 18, height 5' 7" (1.702 m), weight 108 kg (238 lb), SpO2 96 %. Imaging and other studies: I have personally reviewed pertinent reports. O2 Device: RA     Plan    Respiratory Plan: Home Bronchodilator Patient pathway        Resp Comments: pt having very mild distress after going to bathroom. lungs dim wheezy. pt requested prn tx at 21:30.

## 2023-10-13 NOTE — CASE MANAGEMENT
Case Management Assessment & Discharge Planning Note    Patient name Amina Caceres  [facility] [address] [facility] [address] [facility] MRN [id number]  : 1945 Date 10/13/2023       Current Admission Date: 10/11/2023  Current Admission Diagnosis:Shortness of breath   Patient Active Problem List    Diagnosis Date Noted    Shortness of breath 10/11/2023    Chronic respiratory failure with hypoxia ([address]) 10/11/2023    Dilatation of thoracic aorta ([address]) 06/15/2023    Malignant neoplasm of lower lobe of left lung ([address]) 03/15/2023    Recurrent major depressive disorder, in remission ([address]) 03/15/2023    Chronic obstructive pulmonary disease, unspecified COPD type ([address]) 2022    Cardiomyopathy, unspecified type ([address]) 2022    Bronchospasm 2021    Stage 4 chronic kidney disease ([address]) 2021    Obesity, morbid ([address]) 2021    Primary hyperaldosteronism ([address]) 2021    Hyperlipidemia     Prostate cancer ([address]) 2020    Depression 2020    Essential hypertension 2020    Low back pain 2020    Type 2 diabetes mellitus without complication, with long-term current use of insulin ([address]) 2020      LOS (days): 1  Geometric Mean LOS (GMLOS) (days): 2.10  Days to GMLOS:1.1     OBJECTIVE:    Risk of Unplanned Readmission Score: 24.19         Current admission status: Inpatient       Preferred Pharmacy:   [address] [place] [address], [address] [place] [address]  Phone: [phone] Fax: [phone]    Primary Care Provider: Sheliah Primrose, MD    Primary Insurance: Malini  Secondary Insurance: [facility] REP    ASSESSMENT:  Ivy Proxies    There are no active Health Care Proxies on file.        Advance Directives  Does patient have a Health Care POA?: Yes  Does patient have Advance Directives?: Yes  Advance Directives: Living will, Power of  for health care  Primary Contact: Dada Peguero Readmission Root Cause  30 Day Readmission: No    Patient Information  Admitted from[de-identified] Home  Mental Status: Alert  During Assessment patient was accompanied by: Not accompanied during assessment  Assessment information provided by[de-identified] Patient  Primary Caregiver: Self  Support Systems: Spouse/significant other  [facility]: [address] [place] [address] do you live in?: [address] [facility] entry access options.  Select all that apply.: Ramp  Type of Current Residence: 3 story home  Upon entering residence, is there a bedroom on the main floor (no further steps)?: Yes  Upon entering residence, is there a bathroom on the main floor (no further steps)?: Yes  In the last 12 months, was there a time when you were not able to pay the mortgage or rent on time?: No  In the last 12 months, how many places have you lived?: 1  In the last 12 months, was there a time when you did not have a steady place to sleep or slept in a shelter (including now)?: No  Homeless/housing insecurity resource given?: N/A  Living Arrangements: Lives w/ Spouse/significant other  Is patient a ?: Yes  Is patient active with [facility])?: Yes  Is patient service connected?: Yes (W.B)    Activities of Daily Living Prior to Admission  Functional Status: Assistance  Completes ADLs independently?: No  Level of ADL dependence: Assistance  Ambulates independently?: No  Level of ambulatory dependence: Assistance  Does patient use assisted devices?: Yes  Assisted Devices (DME) used: Veleria , Wheelchair, Other (Comment) (Sabina feldmanooter)  Does patient currently own DME?: Yes  What DME does the patient currently own?: [employer] , Wheelchair  Does the patient have a history of Short-Term Rehab?: No  Does patient have a history of HHC?: Yes (does not remember)  Does patient currently have [address]?: No         Patient Information Continued  Income Source: Pension/nursing home  Does patient have prescription coverage?: Yes  Within the past 12 months, you worried that your food would run out before you got the money to buy more.: Never true  Within the past 12 months, the food you bought just didn't last and you didn't have money to get more.: Never true  Food insecurity resource given?: N/A  Does patient receive dialysis treatments?: No  Does patient have a history of substance abuse?: No  Does patient have a history of Mental Health Diagnosis?: Yes  Is patient receiving treatment for mental health?: Yes (ptsd and is on medication)  Has patient received inpatient treatment related to mental health in the last 2 years?: No    PHQ 2/9 Screening   Reviewed PHQ 2/9 Depression Screening Score?: No    Means of Transportation  Means of Transport to Appts[de-identified] Family transport  In the past 12 months, has lack of transportation kept you from medical appointments or from getting medications?: No  In the past 12 months, has lack of transportation kept you from meetings, work, or from getting things needed for daily living?: No  Was application for public transport provided?: N/A        DISCHARGE DETAILS:    Discharge planning discussed with[de-identified] Patient  Freedom of Choice: Yes  Comments - Freedom of Choice: CM discussed freedom of choice as it pertains to discharge planning.  Patient denied having any needs and denied hhc.  CM contacted family/caregiver?: No- see comments  Were Treatment Team discharge recommendations reviewed with patient/caregiver?: Yes  Did patient/caregiver verbalize understanding of patient care needs?: Yes  Were patient/caregiver advised of the risks associated with not following Treatment Team discharge recommendations?: Yes         [address]         Is the patient interested in [facility] at discharge?: No    DME Referral Provided  Referral made for DME?: No    Other Referral/Resources/Interventions Provided:  Interventions: None Indicated    Would you like to participate in our [address] service program?  : No - Declined    Treatment Team Recommendation: Home with Home Health Care  Discharge Destination Plan[de-identified] Home  Transport at Discharge : Family

## 2023-10-14 LAB
ANION GAP SERPL CALCULATED.3IONS-SCNC: 9 MMOL/L
BUN SERPL-MCNC: 58 MG/DL (ref 5–25)
CALCIUM SERPL-MCNC: 9.7 MG/DL (ref 8.4–10.2)
CHLORIDE SERPL-SCNC: 100 MMOL/L (ref 96–108)
CO2 SERPL-SCNC: 23 MMOL/L (ref 21–32)
CREAT SERPL-MCNC: 2.8 MG/DL (ref 0.6–1.3)
GFR SERPL CREATININE-BSD FRML MDRD: 20 ML/MIN/1.73SQ M
GLUCOSE SERPL-MCNC: 277 MG/DL (ref 65–140)
GLUCOSE SERPL-MCNC: 286 MG/DL (ref 65–140)
GLUCOSE SERPL-MCNC: 292 MG/DL (ref 65–140)
GLUCOSE SERPL-MCNC: 307 MG/DL (ref 65–140)
GLUCOSE SERPL-MCNC: 313 MG/DL (ref 65–140)
POTASSIUM SERPL-SCNC: 4.9 MMOL/L (ref 3.5–5.3)
SODIUM SERPL-SCNC: 132 MMOL/L (ref 135–147)

## 2023-10-14 PROCEDURE — C9113 INJ PANTOPRAZOLE SODIUM, VIA: HCPCS | Performed by: NURSE PRACTITIONER

## 2023-10-14 PROCEDURE — 82948 REAGENT STRIP/BLOOD GLUCOSE: CPT

## 2023-10-14 PROCEDURE — 94640 AIRWAY INHALATION TREATMENT: CPT

## 2023-10-14 PROCEDURE — 94664 DEMO&/EVAL PT USE INHALER: CPT

## 2023-10-14 PROCEDURE — 94660 CPAP INITIATION&MGMT: CPT

## 2023-10-14 PROCEDURE — 94760 N-INVAS EAR/PLS OXIMETRY 1: CPT

## 2023-10-14 PROCEDURE — 99232 SBSQ HOSP IP/OBS MODERATE 35: CPT | Performed by: FAMILY MEDICINE

## 2023-10-14 PROCEDURE — 80048 BASIC METABOLIC PNL TOTAL CA: CPT | Performed by: FAMILY MEDICINE

## 2023-10-14 RX ORDER — INSULIN LISPRO 100 [IU]/ML
5 INJECTION, SOLUTION INTRAVENOUS; SUBCUTANEOUS
Status: DISCONTINUED | OUTPATIENT
Start: 2023-10-14 | End: 2023-10-17

## 2023-10-14 RX ORDER — INSULIN GLARGINE 100 [IU]/ML
50 INJECTION, SOLUTION SUBCUTANEOUS
Status: DISCONTINUED | OUTPATIENT
Start: 2023-10-14 | End: 2023-10-16

## 2023-10-14 RX ORDER — METHYLPREDNISOLONE SODIUM SUCCINATE 40 MG/ML
40 INJECTION, POWDER, LYOPHILIZED, FOR SOLUTION INTRAMUSCULAR; INTRAVENOUS EVERY 8 HOURS SCHEDULED
Status: DISCONTINUED | OUTPATIENT
Start: 2023-10-14 | End: 2023-10-16

## 2023-10-14 RX ORDER — HYDROCODONE BITARTRATE AND HOMATROPINE METHYLBROMIDE ORAL SOLUTION 5; 1.5 MG/5ML; MG/5ML
5 LIQUID ORAL EVERY 4 HOURS PRN
Status: DISCONTINUED | OUTPATIENT
Start: 2023-10-14 | End: 2023-10-18 | Stop reason: HOSPADM

## 2023-10-14 RX ADMIN — INSULIN GLARGINE 50 UNITS: 100 INJECTION, SOLUTION SUBCUTANEOUS at 21:07

## 2023-10-14 RX ADMIN — KETOTIFEN FUMARATE 1 DROP: 0.25 SOLUTION/ DROPS OPHTHALMIC at 09:00

## 2023-10-14 RX ADMIN — LATANOPROST 1 DROP: 50 SOLUTION OPHTHALMIC at 21:07

## 2023-10-14 RX ADMIN — GUAIFENESIN 600 MG: 600 TABLET ORAL at 08:35

## 2023-10-14 RX ADMIN — INSULIN LISPRO 4 UNITS: 100 INJECTION, SOLUTION INTRAVENOUS; SUBCUTANEOUS at 17:24

## 2023-10-14 RX ADMIN — BRIMONIDINE TARTRATE 1 DROP: 2 SOLUTION/ DROPS OPHTHALMIC at 21:07

## 2023-10-14 RX ADMIN — BUDESONIDE AND FORMOTEROL FUMARATE DIHYDRATE 2 PUFF: 160; 4.5 AEROSOL RESPIRATORY (INHALATION) at 21:08

## 2023-10-14 RX ADMIN — TRAZODONE HYDROCHLORIDE 50 MG: 50 TABLET ORAL at 21:08

## 2023-10-14 RX ADMIN — HEPARIN SODIUM 5000 UNITS: 5000 INJECTION INTRAVENOUS; SUBCUTANEOUS at 21:07

## 2023-10-14 RX ADMIN — PREDNISOLONE ACETATE 1 DROP: 10 SUSPENSION/ DROPS OPHTHALMIC at 21:08

## 2023-10-14 RX ADMIN — INSULIN LISPRO 5 UNITS: 100 INJECTION, SOLUTION INTRAVENOUS; SUBCUTANEOUS at 17:23

## 2023-10-14 RX ADMIN — PANTOPRAZOLE SODIUM 40 MG: 40 INJECTION, POWDER, FOR SOLUTION INTRAVENOUS at 08:35

## 2023-10-14 RX ADMIN — INSULIN LISPRO 4 UNITS: 100 INJECTION, SOLUTION INTRAVENOUS; SUBCUTANEOUS at 21:07

## 2023-10-14 RX ADMIN — GABAPENTIN 300 MG: 300 CAPSULE ORAL at 08:36

## 2023-10-14 RX ADMIN — PAROXETINE HYDROCHLORIDE 30 MG: 20 TABLET, FILM COATED ORAL at 08:36

## 2023-10-14 RX ADMIN — CLONAZEPAM 0.5 MG: 0.5 TABLET ORAL at 17:37

## 2023-10-14 RX ADMIN — GABAPENTIN 300 MG: 300 CAPSULE ORAL at 17:19

## 2023-10-14 RX ADMIN — INSULIN LISPRO 5 UNITS: 100 INJECTION, SOLUTION INTRAVENOUS; SUBCUTANEOUS at 12:31

## 2023-10-14 RX ADMIN — DOCUSATE SODIUM 100 MG: 100 CAPSULE, LIQUID FILLED ORAL at 17:21

## 2023-10-14 RX ADMIN — HEPARIN SODIUM 5000 UNITS: 5000 INJECTION INTRAVENOUS; SUBCUTANEOUS at 14:18

## 2023-10-14 RX ADMIN — BRIMONIDINE TARTRATE 1 DROP: 2 SOLUTION/ DROPS OPHTHALMIC at 08:37

## 2023-10-14 RX ADMIN — GUAIFENESIN 600 MG: 600 TABLET ORAL at 21:08

## 2023-10-14 RX ADMIN — CLONIDINE HYDROCHLORIDE 0.1 MG: 0.1 TABLET ORAL at 17:19

## 2023-10-14 RX ADMIN — INSULIN LISPRO 4 UNITS: 100 INJECTION, SOLUTION INTRAVENOUS; SUBCUTANEOUS at 12:30

## 2023-10-14 RX ADMIN — ASPIRIN 81 MG: 81 TABLET, COATED ORAL at 08:36

## 2023-10-14 RX ADMIN — BUDESONIDE AND FORMOTEROL FUMARATE DIHYDRATE 2 PUFF: 160; 4.5 AEROSOL RESPIRATORY (INHALATION) at 08:16

## 2023-10-14 RX ADMIN — BRIMONIDINE TARTRATE 1 DROP: 2 SOLUTION/ DROPS OPHTHALMIC at 17:25

## 2023-10-14 RX ADMIN — INSULIN LISPRO 4 UNITS: 100 INJECTION, SOLUTION INTRAVENOUS; SUBCUTANEOUS at 08:00

## 2023-10-14 RX ADMIN — METHYLPREDNISOLONE SODIUM SUCCINATE 40 MG: 40 INJECTION, POWDER, FOR SOLUTION INTRAMUSCULAR; INTRAVENOUS at 08:35

## 2023-10-14 RX ADMIN — KETOTIFEN FUMARATE 1 DROP: 0.25 SOLUTION/ DROPS OPHTHALMIC at 17:24

## 2023-10-14 RX ADMIN — DOCUSATE SODIUM 100 MG: 100 CAPSULE, LIQUID FILLED ORAL at 08:36

## 2023-10-14 RX ADMIN — ATORVASTATIN CALCIUM 20 MG: 20 TABLET, FILM COATED ORAL at 17:21

## 2023-10-14 RX ADMIN — METHYLPREDNISOLONE SODIUM SUCCINATE 40 MG: 40 INJECTION, POWDER, FOR SOLUTION INTRAMUSCULAR; INTRAVENOUS at 14:18

## 2023-10-14 RX ADMIN — METHYLPREDNISOLONE SODIUM SUCCINATE 40 MG: 40 INJECTION, POWDER, FOR SOLUTION INTRAMUSCULAR; INTRAVENOUS at 21:07

## 2023-10-14 RX ADMIN — PANTOPRAZOLE SODIUM 40 MG: 40 INJECTION, POWDER, FOR SOLUTION INTRAVENOUS at 21:07

## 2023-10-14 RX ADMIN — SPIRONOLACTONE 25 MG: 25 TABLET, COATED ORAL at 08:36

## 2023-10-14 RX ADMIN — HEPARIN SODIUM 5000 UNITS: 5000 INJECTION INTRAVENOUS; SUBCUTANEOUS at 05:47

## 2023-10-14 RX ADMIN — AMLODIPINE BESYLATE 10 MG: 10 TABLET ORAL at 08:36

## 2023-10-14 RX ADMIN — INSULIN LISPRO 5 UNITS: 100 INJECTION, SOLUTION INTRAVENOUS; SUBCUTANEOUS at 09:00

## 2023-10-14 RX ADMIN — LEVALBUTEROL HYDROCHLORIDE 1.25 MG: 1.25 SOLUTION RESPIRATORY (INHALATION) at 17:49

## 2023-10-14 RX ADMIN — CLONIDINE HYDROCHLORIDE 0.1 MG: 0.1 TABLET ORAL at 08:36

## 2023-10-14 NOTE — UTILIZATION REVIEW
NOTIFICATION OF INPATIENT ADMISSION   AUTHORIZATION REQUEST   SERVICING FACILITY:   [address] [place] [address] [address]  Tax ID: [id number]  NPI: [id number] ATTENDING PROVIDER:  Attending Name and NPI#: Annalise Louise [place] [[id number]]  Address: [address]  Phone: [phone]     ADMISSION INFORMATION:  Place of Service: [address] [place] [address]  Place of Service Code: 21  Inpatient Admission Date/Time: 10/12/23 11:30 AM  Discharge Date/Time: No discharge date for patient encounter. Admitting Diagnosis Code/Description:  SOB (shortness of breath) [R06.02]     UTILIZATION REVIEW CONTACT:  Peter Garza Utilization   Network Utilization Review Department  Phone: [phone]  Fax [phone]  Email: [email or web address]  Contact for approvals/pending authorizations, clinical reviews, and discharge. PHYSICIAN ADVISORY SERVICES:  Medical Necessity Denial & Izss-cq-Hfsp Review  Phone: [phone]  Fax: [phone]  Email: [email or web address]     DISCHARGE SUPPORT TEAM:  For Patients Discharge Needs & Updates  Phone: [phone] opt. 2 Fax: [phone]  Email: [email or web address]

## 2023-10-14 NOTE — ASSESSMENT & PLAN NOTE
Chronic, controlled, as evidenced by a hemoglobin A1C of 6.5  Increase Lantus and write for mealtime coverage given the patient is on steroids with underlying diabetes

## 2023-10-14 NOTE — RESPIRATORY THERAPY NOTE
RT Protocol Note  Naman Jett 70 y.o. male MRN: [id number]  Unit/Bed#: -01 Encounter: [id number]    Assessment    Principal Problem:    Shortness of breath  Active Problems:    Essential hypertension    Type 2 diabetes mellitus without complication, with long-term current use of insulin (HCC)    Primary hyperaldosteronism (HCC)    Stage 4 chronic kidney disease (HCC)    Chronic obstructive pulmonary disease, unspecified COPD type ([address])    Malignant neoplasm of lower lobe of left lung (HCC)    Chronic respiratory failure with hypoxia (HCC)  Physical Exam:   Assessment Type: Assess only  General Appearance: Sleeping  Respiratory Pattern: Normal  Chest Assessment: Chest expansion symmetrical  Bilateral Breath Sounds: Diminished  O2 Device: ra    Vitals:  Blood pressure 124/72, pulse 89, temperature 98.3 °F (36.8 °C), resp. rate 18, height 5' 7" (1.702 m), weight 107 kg (234 lb 12.6 oz), SpO2 98 %. O2 Device: ra     Plan    Respiratory Plan: No distress/Pulmonary history        Resp Comments: placed pt on NIV for HS use, RN notified        10/14/23 4083   Respiratory Protocol   Protocol Initiated? No   Protocol Selection Respiratory   Language Barrier? No   Medical & Social History Reviewed? Yes   Diagnostic Studies Reviewed? Yes   Physical Assessment Performed? Yes   Home Devices/Therapy BiPAP/CPAP; Home O2   Respiratory Assessment   Assessment Type Assess only   General Appearance Sleeping   Respiratory Pattern Normal   Chest Assessment Chest expansion symmetrical   Bilateral Breath Sounds Diminished   O2 Device ra   Additional Assessments   Respirations 18

## 2023-10-14 NOTE — PROGRESS NOTES
[address]  Progress Note  Name: Jane Segura  MRN: [id number]  Unit/Bed#: -01 I Date of Admission: 10/11/2023   Date of Service: 10/14/2023 I Hospital Day: 2    Assessment/Plan   Chronic respiratory failure with hypoxia (HCC)  Assessment & Plan  On baseline 3 L. Currently at baseline. Malignant neoplasm of lower lobe of left lung [facility])  Assessment & Plan  Outpatient follow-up    Chronic obstructive pulmonary disease, unspecified COPD type ([address])  Assessment & Plan  History of COPD on baseline 3 L oxygen. Patient does have some mild wheezing  Will increase frequency of steroids    Stage 4 chronic kidney disease ([address])  Assessment & Plan  History of CKD stg 4; with baseline creatinine likely around 2.2-2.5, no updated labs this year. Creatinine noted to be 2.68 on admission, continuing to rise, 2.95  Will check urine studies today  Urine creatinine, Urine sodium    Type 2 diabetes mellitus without complication, with long-term current use of insulin ([facility])  Assessment & Plan  Chronic, controlled, as evidenced by a hemoglobin A1C of 6.5  Increase Lantus and write for mealtime coverage given the patient is on steroids with underlying diabetes    Essential hypertension  Assessment & Plan  Continue amlodipine, Aldactone    * Shortness of breath  Assessment & Plan  Patient presented to the ED with complaints of worsening shortness of breath x 1 week. BNP 48.  CT imaging negative for volume overload, BNP negative. Procalcitonin negative. Covid/Flu/RSV Negative  CT Chest (10/11/23): Unremarkable CT scan of the chest with only minor compressive atelectasis at the left lung base due to an elevated left hemidiaphragm   Echo (10/12/23): Left ventricular cavity size is normal. Wall thickness is mildly increased. The left ventricular ejection fraction is 60%.  Systolic function is normal.  Wall motion is normal. Diastolic function is mildly abnormal, consistent with grade I (abnormal) relaxation. Currently on  RA, %  Likely that shortness of breath related to COPD that was exacerbated by a virus. Continue with steroids for now       VTE Pharmacologic Prophylaxis:   Pharmacologic: Heparin  Mechanical VTE Prophylaxis in Place: Yes    Patient Centered Rounds: I have performed bedside rounds with nursing staff today. Education and Discussions with Family / Patient: The wife was on the phone    Time Spent for Care: 20 minutes. More than 50% of total time spent on counseling and coordination of care as described above. Current Length of Stay: 2 day(s)    Current Patient Status: Inpatient   Certification Statement: The patient will continue to require additional inpatient hospital stay due to having wheezing in need of IV steroids    Discharge Plan: Anticipate discharge tomorrow    Code Status: Level 1 - Full Code      Subjective:   Patient seen and examined. Complaining of cough and shortness of breath with exertion    Objective:     Vitals:   Temp (24hrs), Av °F (36.7 °C), Min:97.4 °F (36.3 °C), Max:98.8 °F (37.1 °C)    Temp:  [97.4 °F (36.3 °C)-98.8 °F (37.1 °C)] 97.8 °F (36.6 °C)  HR:  [77-88] 79  Resp:  [16-18] 18  BP: (113-130)/(65-73) 126/71  SpO2:  [95 %-98 %] 95 %  Body mass index is 36.77 kg/m². Input and Output Summary (last 24 hours):        Intake/Output Summary (Last 24 hours) at 10/14/2023 1231  Last data filed at 10/14/2023 0800  Gross per 24 hour   Intake 960 ml   Output 3800 ml   Net -2840 ml       Physical Exam:     Physical Exam  (   General Appearance:    Alert, cooperative, no distress, appears stated age                               Lungs:   Poor inspiratory effort decreased breath sounds with expiratory wheezes which are diffuse       Heart:    Regular rate and rhythm, S1 and S2 normal, no murmur, rub    or gallop   Abdomen:     Soft, non-tender, bowel sounds active all four quadrants,     no masses, no organomegaly           Extremities:   Extremities normal, atraumatic, no cyanosis or edema       Additional Data:     Labs:    Results from last 7 days   Lab Units 10/13/23  0611 10/12/23  0455 10/11/23  1605   WBC Thousand/uL 5.75   < > 6.55   HEMOGLOBIN g/dL 9.8*   < > 10.5*   HEMATOCRIT % 31.4*   < > 33.6*   PLATELETS Thousands/uL 178   < > 207   NEUTROS PCT %  --   --  69   LYMPHS PCT %  --   --  16   MONOS PCT %  --   --  8   EOS PCT %  --   --  5    < > = values in this interval not displayed. Results from last 7 days   Lab Units 10/14/23  0834 10/12/23  0455 10/11/23  1605   SODIUM mmol/L 132*   < > 139   POTASSIUM mmol/L 4.9   < > 4.4   CHLORIDE mmol/L 100   < > 107   CO2 mmol/L 23   < > 23   BUN mg/dL 58*   < > 37*   CREATININE mg/dL 2.80*   < > 2.68*   ANION GAP mmol/L 9   < > 9   CALCIUM mg/dL 9.7   < > 9.7   ALBUMIN g/dL  --   --  4.4   TOTAL BILIRUBIN mg/dL  --   --  0.70   ALK PHOS U/L  --   --  69   ALT U/L  --   --  15   AST U/L  --   --  14   GLUCOSE RANDOM mg/dL 313*   < > 84    < > = values in this interval not displayed. Results from last 7 days   Lab Units 10/11/23  1639   INR  1.11     Results from last 7 days   Lab Units 10/14/23  1034 10/14/23  0654 10/13/23  2130 10/13/23  1550 10/13/23  1059 10/13/23  0649 10/12/23  2046 10/12/23  1840 10/12/23  1540 10/12/23  1114 10/12/23  0753 10/11/23  2311   POC GLUCOSE mg/dl 307* 292* 281* 291* 310* 313* 347* 228* 232* 167* 121 134         Results from last 7 days   Lab Units 10/13/23  0611 10/11/23  1639   PROCALCITONIN ng/ml 0.10 0.13           * I Have Reviewed All Lab Data Listed Above. * Additional Pertinent Lab Tests Reviewed:  All [facility] Admission Reviewed        Recent Cultures (last 7 days):           Last 24 Hours Medication List:   Current Facility-Administered Medications   Medication Dose Route Frequency Provider Last Rate    acetaminophen  650 mg Oral Q6H PRN Amy Alberts MD      amLODIPine  10 mg Oral Daily Amy Alberts MD      aspirin  81 mg Oral Daily Yadira Martinez MD      atorvastatin  20 mg Oral Daily With Dinner Yadira Martinez MD      brimonidine tartrate  1 drop Both Eyes TID Yadira Martinez MD      budesonide-formoterol  2 puff Inhalation BID Yadira Martinez MD      clonazePAM  0.5 mg Oral Daily PRN Yadira Martinez MD      cloNIDine  0.1 mg Oral BID Yadira Martinez MD      Diclofenac Sodium  2 g Topical 4x Daily Yadira Martinez MD      docusate sodium  100 mg Oral BID MARS Krishna      gabapentin  300 mg Oral BID Yadira Martinez MD      glycerin-hypromellose-  1 drop Both Eyes Q6H PRN Yadira Martinez MD      guaiFENesin  600 mg Oral Q12H [facility] MARS Gipson      heparin (porcine)  5,000 Units Subcutaneous Q8H [facility] Yadira Martinez MD      HYDROcodone Bit-Homatrop MBr  5 mL Oral Q4H PRN Ignacio Christensen MD      insulin glargine  50 Units Subcutaneous HS Ignacio Christensen MD      insulin lispro  1-6 Units Subcutaneous TID AC Yadira Martinez MD      insulin lispro  1-6 Units Subcutaneous HS Amparo Alejandre PA-C      insulin lispro  5 Units Subcutaneous TID With Meals Ignacio Christensen MD      ketotifen  1 drop Both Eyes BID Yadira Martinez MD      latanoprost  1 drop Both Eyes HS Yadira Martinez MD      levalbuterol  1.25 mg Nebulization Q8H PRN MARS Krishna      methylPREDNISolone sodium succinate  40 mg Intravenous Q8H [facility] Ignacio Christensen MD      ondansetron  4 mg Intravenous Q6H PRN Yadira Martinez MD      pantoprazole  40 mg Intravenous Q12H [facility] MARS Gipson      PARoxetine  30 mg Oral QAM Yadira Martinez MD      prednisoLONE acetate  1 drop Both Eyes HS Yadira Martinez MD      spironolactone  25 mg Oral Daily Yadira Martinez MD      traZODone  50 mg Oral HS Yadira Martinez MD          Today, Patient Was Seen By: Gillermo Heimlich, MD    ** Please Note: Dictation voice to text software may have been used in the creation of this document.  **

## 2023-10-14 NOTE — ASSESSMENT & PLAN NOTE
Patient presented to the ED with complaints of worsening shortness of breath x 1 week. BNP 48.  CT imaging negative for volume overload, BNP negative. Procalcitonin negative. Covid/Flu/RSV Negative  CT Chest (10/11/23): Unremarkable CT scan of the chest with only minor compressive atelectasis at the left lung base due to an elevated left hemidiaphragm   Echo (10/12/23): Left ventricular cavity size is normal. Wall thickness is mildly increased. The left ventricular ejection fraction is 60%. Systolic function is normal.  Wall motion is normal. Diastolic function is mildly abnormal, consistent with grade I (abnormal) relaxation. Currently on  RA, %  Likely that shortness of breath related to COPD that was exacerbated by a virus.   Continue with steroids for now

## 2023-10-14 NOTE — RESPIRATORY THERAPY NOTE
10/13/23 3985   Respiratory Assessment   Resp Comments placed pt on NIV for HS use   O2 Device v60   Non-Invasive Information   O2 Interface Device Face mask   Non-Invasive Ventilation Mode BiPAP   SpO2 97 %   $ Pulse Oximetry Spot Check Charge Completed   Non-Invasive Settings   IPAP (cm) 12 cm   EPAP (cm) 6 cm   Rate (Set) 8   FiO2 (%) 32   Rise Time 3  (15 min ramp applied)   Inspiratory Time (Set) 1   Humidification   (passover)   Non-Invasive Readings   Skin Intervention Skin intact   Total Rate 23   Vt (mL) (Mech) 593   MV (Mech) 13.6   Peak Pressure (Obs) 9   Leak (lpm) 4   Non-Invasive Alarms   Insp Pressure High (cm H20) 25   Insp Pressure Low (cm H20) 5   Low Insp Pressure Time (sec) 20 sec   MV Low (L/min) 3   Vt High (mL) 1200   Vt Low (mL) 200   High Resp Rate (BPM) 40 BPM   Low Resp Rate (BPM) 8 BPM   Maintenance   Water bag changed No

## 2023-10-14 NOTE — PLAN OF CARE
Problem: Potential for Falls  Goal: Patient will remain free of falls  Description: INTERVENTIONS:  - Educate patient/family on patient safety including physical limitations  - Instruct patient to call for assistance with activity   - Consult OT/PT to assist with strengthening/mobility   - Keep Call bell within reach  - Keep bed low and locked with side rails adjusted as appropriate  - Keep care items and personal belongings within reach  - Initiate and maintain comfort rounds  - Make Fall Risk Sign visible to staff  - Apply yellow socks and bracelet for high fall risk patients  - Consider moving patient to room near nurses station  Outcome: Progressing     Problem: MOBILITY - ADULT  Goal: Maintain or return to baseline ADL function  Description: INTERVENTIONS:  -  Assess patient's ability to carry out ADLs; assess patient's baseline for ADL function and identify physical deficits which impact ability to perform ADLs (bathing, care of mouth/teeth, toileting, grooming, dressing, etc.)  - Assess/evaluate cause of self-care deficits   - Assess range of motion  - Assess patient's mobility; develop plan if impaired  - Assess patient's need for assistive devices and provide as appropriate  - Encourage maximum independence but intervene and supervise when necessary  - Involve family in performance of ADLs  - Assess for home care needs following discharge   - Consider OT consult to assist with ADL evaluation and planning for discharge  - Provide patient education as appropriate  Outcome: Progressing  Goal: Maintains/Returns to pre admission functional level  Description: INTERVENTIONS:  - Perform BMAT or MOVE assessment daily.   - Set and communicate daily mobility goal to care team and patient/family/caregiver.    - Collaborate with rehabilitation services on mobility goals if consulted  - Out of bed for toileting  - Record patient progress and toleration of activity level   Outcome: Progressing     Problem: RESPIRATORY - ADULT  Goal: Achieves optimal ventilation and oxygenation  Description: INTERVENTIONS:  - Assess for changes in respiratory status  - Assess for changes in mentation and behavior  - Position to facilitate oxygenation and minimize respiratory effort  - Oxygen administered by appropriate delivery if ordered  - Initiate smoking cessation education as indicated  - Encourage broncho-pulmonary hygiene including cough, deep breathe, Incentive Spirometry  - Assess the need for suctioning and aspirate as needed  - Assess and instruct to report SOB or any respiratory difficulty  - Respiratory Therapy support as indicated  Outcome: Progressing     Problem: METABOLIC, FLUID AND ELECTROLYTES - ADULT  Goal: Glucose maintained within target range  Description: INTERVENTIONS:  - Monitor Blood Glucose as ordered  - Assess for signs and symptoms of hyperglycemia and hypoglycemia  - Administer ordered medications to maintain glucose within target range  - Assess nutritional intake and initiate nutrition service referral as needed  Outcome: Progressing

## 2023-10-14 NOTE — ASSESSMENT & PLAN NOTE
History of COPD on baseline 3 L oxygen.   Patient does have some mild wheezing  Will increase frequency of steroids

## 2023-10-15 LAB
ANION GAP SERPL CALCULATED.3IONS-SCNC: 7 MMOL/L
BUN SERPL-MCNC: 60 MG/DL (ref 5–25)
CALCIUM SERPL-MCNC: 10 MG/DL (ref 8.4–10.2)
CHLORIDE SERPL-SCNC: 100 MMOL/L (ref 96–108)
CO2 SERPL-SCNC: 25 MMOL/L (ref 21–32)
CREAT SERPL-MCNC: 2.69 MG/DL (ref 0.6–1.3)
GFR SERPL CREATININE-BSD FRML MDRD: 22 ML/MIN/1.73SQ M
GLUCOSE SERPL-MCNC: 243 MG/DL (ref 65–140)
GLUCOSE SERPL-MCNC: 245 MG/DL (ref 65–140)
GLUCOSE SERPL-MCNC: 270 MG/DL (ref 65–140)
GLUCOSE SERPL-MCNC: 277 MG/DL (ref 65–140)
GLUCOSE SERPL-MCNC: 325 MG/DL (ref 65–140)
POTASSIUM SERPL-SCNC: 5.1 MMOL/L (ref 3.5–5.3)
SODIUM SERPL-SCNC: 132 MMOL/L (ref 135–147)

## 2023-10-15 PROCEDURE — 94760 N-INVAS EAR/PLS OXIMETRY 1: CPT

## 2023-10-15 PROCEDURE — C9113 INJ PANTOPRAZOLE SODIUM, VIA: HCPCS | Performed by: NURSE PRACTITIONER

## 2023-10-15 PROCEDURE — 94660 CPAP INITIATION&MGMT: CPT

## 2023-10-15 PROCEDURE — 94640 AIRWAY INHALATION TREATMENT: CPT

## 2023-10-15 PROCEDURE — 82948 REAGENT STRIP/BLOOD GLUCOSE: CPT

## 2023-10-15 PROCEDURE — 80048 BASIC METABOLIC PNL TOTAL CA: CPT | Performed by: FAMILY MEDICINE

## 2023-10-15 PROCEDURE — 99232 SBSQ HOSP IP/OBS MODERATE 35: CPT | Performed by: FAMILY MEDICINE

## 2023-10-15 RX ADMIN — INSULIN GLARGINE 50 UNITS: 100 INJECTION, SOLUTION SUBCUTANEOUS at 22:06

## 2023-10-15 RX ADMIN — BUDESONIDE AND FORMOTEROL FUMARATE DIHYDRATE 2 PUFF: 160; 4.5 AEROSOL RESPIRATORY (INHALATION) at 22:03

## 2023-10-15 RX ADMIN — INSULIN LISPRO 5 UNITS: 100 INJECTION, SOLUTION INTRAVENOUS; SUBCUTANEOUS at 16:05

## 2023-10-15 RX ADMIN — METHYLPREDNISOLONE SODIUM SUCCINATE 40 MG: 40 INJECTION, POWDER, FOR SOLUTION INTRAMUSCULAR; INTRAVENOUS at 14:00

## 2023-10-15 RX ADMIN — HEPARIN SODIUM 5000 UNITS: 5000 INJECTION INTRAVENOUS; SUBCUTANEOUS at 22:05

## 2023-10-15 RX ADMIN — CLONIDINE HYDROCHLORIDE 0.1 MG: 0.1 TABLET ORAL at 17:01

## 2023-10-15 RX ADMIN — ASPIRIN 81 MG: 81 TABLET, COATED ORAL at 08:01

## 2023-10-15 RX ADMIN — BUDESONIDE AND FORMOTEROL FUMARATE DIHYDRATE 2 PUFF: 160; 4.5 AEROSOL RESPIRATORY (INHALATION) at 08:03

## 2023-10-15 RX ADMIN — INSULIN LISPRO 5 UNITS: 100 INJECTION, SOLUTION INTRAVENOUS; SUBCUTANEOUS at 11:30

## 2023-10-15 RX ADMIN — INSULIN LISPRO 5 UNITS: 100 INJECTION, SOLUTION INTRAVENOUS; SUBCUTANEOUS at 22:10

## 2023-10-15 RX ADMIN — TRAZODONE HYDROCHLORIDE 50 MG: 50 TABLET ORAL at 22:05

## 2023-10-15 RX ADMIN — INSULIN LISPRO 3 UNITS: 100 INJECTION, SOLUTION INTRAVENOUS; SUBCUTANEOUS at 16:05

## 2023-10-15 RX ADMIN — KETOTIFEN FUMARATE 1 DROP: 0.25 SOLUTION/ DROPS OPHTHALMIC at 08:03

## 2023-10-15 RX ADMIN — ATORVASTATIN CALCIUM 20 MG: 20 TABLET, FILM COATED ORAL at 16:17

## 2023-10-15 RX ADMIN — BRIMONIDINE TARTRATE 1 DROP: 2 SOLUTION/ DROPS OPHTHALMIC at 16:15

## 2023-10-15 RX ADMIN — SPIRONOLACTONE 25 MG: 25 TABLET, COATED ORAL at 08:01

## 2023-10-15 RX ADMIN — GABAPENTIN 300 MG: 300 CAPSULE ORAL at 08:01

## 2023-10-15 RX ADMIN — BRIMONIDINE TARTRATE 1 DROP: 2 SOLUTION/ DROPS OPHTHALMIC at 22:03

## 2023-10-15 RX ADMIN — LEVALBUTEROL HYDROCHLORIDE 1.25 MG: 1.25 SOLUTION RESPIRATORY (INHALATION) at 22:32

## 2023-10-15 RX ADMIN — PANTOPRAZOLE SODIUM 40 MG: 40 INJECTION, POWDER, FOR SOLUTION INTRAVENOUS at 08:00

## 2023-10-15 RX ADMIN — KETOTIFEN FUMARATE 1 DROP: 0.25 SOLUTION/ DROPS OPHTHALMIC at 17:01

## 2023-10-15 RX ADMIN — PANTOPRAZOLE SODIUM 40 MG: 40 INJECTION, POWDER, FOR SOLUTION INTRAVENOUS at 22:08

## 2023-10-15 RX ADMIN — PAROXETINE HYDROCHLORIDE 30 MG: 20 TABLET, FILM COATED ORAL at 08:00

## 2023-10-15 RX ADMIN — METHYLPREDNISOLONE SODIUM SUCCINATE 40 MG: 40 INJECTION, POWDER, FOR SOLUTION INTRAMUSCULAR; INTRAVENOUS at 22:07

## 2023-10-15 RX ADMIN — LATANOPROST 1 DROP: 50 SOLUTION OPHTHALMIC at 22:11

## 2023-10-15 RX ADMIN — GUAIFENESIN 600 MG: 600 TABLET ORAL at 08:01

## 2023-10-15 RX ADMIN — CLONIDINE HYDROCHLORIDE 0.1 MG: 0.1 TABLET ORAL at 08:00

## 2023-10-15 RX ADMIN — INSULIN LISPRO 3 UNITS: 100 INJECTION, SOLUTION INTRAVENOUS; SUBCUTANEOUS at 07:55

## 2023-10-15 RX ADMIN — INSULIN LISPRO 5 UNITS: 100 INJECTION, SOLUTION INTRAVENOUS; SUBCUTANEOUS at 07:56

## 2023-10-15 RX ADMIN — METHYLPREDNISOLONE SODIUM SUCCINATE 40 MG: 40 INJECTION, POWDER, FOR SOLUTION INTRAMUSCULAR; INTRAVENOUS at 06:04

## 2023-10-15 RX ADMIN — GUAIFENESIN 600 MG: 600 TABLET ORAL at 22:05

## 2023-10-15 RX ADMIN — PREDNISOLONE ACETATE 1 DROP: 10 SUSPENSION/ DROPS OPHTHALMIC at 22:19

## 2023-10-15 RX ADMIN — INSULIN LISPRO 4 UNITS: 100 INJECTION, SOLUTION INTRAVENOUS; SUBCUTANEOUS at 11:30

## 2023-10-15 RX ADMIN — HEPARIN SODIUM 5000 UNITS: 5000 INJECTION INTRAVENOUS; SUBCUTANEOUS at 06:04

## 2023-10-15 RX ADMIN — GABAPENTIN 300 MG: 300 CAPSULE ORAL at 17:01

## 2023-10-15 RX ADMIN — BRIMONIDINE TARTRATE 1 DROP: 2 SOLUTION/ DROPS OPHTHALMIC at 08:03

## 2023-10-15 RX ADMIN — DOCUSATE SODIUM 100 MG: 100 CAPSULE, LIQUID FILLED ORAL at 08:01

## 2023-10-15 RX ADMIN — DOCUSATE SODIUM 100 MG: 100 CAPSULE, LIQUID FILLED ORAL at 17:01

## 2023-10-15 RX ADMIN — AMLODIPINE BESYLATE 10 MG: 10 TABLET ORAL at 08:00

## 2023-10-15 RX ADMIN — HEPARIN SODIUM 5000 UNITS: 5000 INJECTION INTRAVENOUS; SUBCUTANEOUS at 14:03

## 2023-10-15 NOTE — PLAN OF CARE
Problem: Potential for Falls  Goal: Patient will remain free of falls  Description: INTERVENTIONS:  - Educate patient/family on patient safety including physical limitations  - Instruct patient to call for assistance with activity   - Consult OT/PT to assist with strengthening/mobility   - Keep Call bell within reach  - Keep bed low and locked with side rails adjusted as appropriate  - Keep care items and personal belongings within reach  - Initiate and maintain comfort rounds  - Apply yellow socks and bracelet for high fall risk patients  - Consider moving patient to room near nurses station  Outcome: Progressing     Problem: MOBILITY - ADULT  Goal: Maintain or return to baseline ADL function  Description: INTERVENTIONS:  -  Assess patient's ability to carry out ADLs; assess patient's baseline for ADL function and identify physical deficits which impact ability to perform ADLs (bathing, care of mouth/teeth, toileting, grooming, dressing, etc.)  - Assess/evaluate cause of self-care deficits   - Assess range of motion  - Assess patient's mobility; develop plan if impaired  - Assess patient's need for assistive devices and provide as appropriate  - Encourage maximum independence but intervene and supervise when necessary  - Involve family in performance of ADLs  - Assess for home care needs following discharge   - Consider OT consult to assist with ADL evaluation and planning for discharge  - Provide patient education as appropriate  Outcome: Progressing  Goal: Maintains/Returns to pre admission functional level  Description: INTERVENTIONS:  - Perform BMAT or MOVE assessment daily.   - Set and communicate daily mobility goal to care team and patient/family/caregiver.    - Collaborate with rehabilitation services on mobility goals if consulted  - Record patient progress and toleration of activity level   Outcome: Progressing     Problem: RESPIRATORY - ADULT  Goal: Achieves optimal ventilation and oxygenation  Description: INTERVENTIONS:  - Assess for changes in respiratory status  - Assess for changes in mentation and behavior  - Position to facilitate oxygenation and minimize respiratory effort  - Oxygen administered by appropriate delivery if ordered  - Initiate smoking cessation education as indicated  - Encourage broncho-pulmonary hygiene including cough, deep breathe, Incentive Spirometry  - Assess the need for suctioning and aspirate as needed  - Assess and instruct to report SOB or any respiratory difficulty  - Respiratory Therapy support as indicated  Outcome: Progressing     Problem: METABOLIC, FLUID AND ELECTROLYTES - ADULT  Goal: Glucose maintained within target range  Description: INTERVENTIONS:  - Monitor Blood Glucose as ordered  - Assess for signs and symptoms of hyperglycemia and hypoglycemia  - Administer ordered medications to maintain glucose within target range  - Assess nutritional intake and initiate nutrition service referral as needed  Outcome: Progressing     Problem: Prexisting or High Potential for Compromised Skin Integrity  Goal: Skin integrity is maintained or improved  Description: INTERVENTIONS:  - Identify patients at risk for skin breakdown  - Assess and monitor skin integrity  - Assess and monitor nutrition and hydration status  - Monitor labs   - Assess for incontinence   - Turn and reposition patient  - Assist with mobility/ambulation  - Relieve pressure over bony prominences  - Avoid friction and shearing  - Provide appropriate hygiene as needed including keeping skin clean and dry  - Evaluate need for skin moisturizer/barrier cream  - Collaborate with interdisciplinary team   - Patient/family teaching  - Consider wound care consult   Outcome: Progressing

## 2023-10-15 NOTE — ASSESSMENT & PLAN NOTE
History of CKD stg 4; with baseline creatinine likely around 2.2-2.5, no updated labs this year. Creatinine is stable.   Continue to monitor intermittently

## 2023-10-15 NOTE — PROGRESS NOTES
[address]  Progress Note  Name: Hawk Gupta  MRN: [id number]  Unit/Bed#: -01 I Date of Admission: 10/11/2023   Date of Service: 10/15/2023 I Hospital Day: 3    Assessment/Plan   Chronic respiratory failure with hypoxia (HCC)  Assessment & Plan  On baseline 3 L. Currently at baseline. Malignant neoplasm of lower lobe of left lung [facility])  Assessment & Plan  Outpatient follow-up    Chronic obstructive pulmonary disease, unspecified COPD type ([address])  Assessment & Plan  History of COPD on baseline 3 L oxygen. Patient does have some mild wheezing  Increase the steroids yesterday. I am going to continue with that increased dose    Stage 4 chronic kidney disease ([address])  Assessment & Plan  History of CKD stg 4; with baseline creatinine likely around 2.2-2.5, no updated labs this year. Creatinine is stable. Continue to monitor intermittently    Primary hyperaldosteronism ([address])  Assessment & Plan  Continue Aldactone    Type 2 diabetes mellitus without complication, with long-term current use of insulin (HCC)  Assessment & Plan  Chronic, controlled, as evidenced by a hemoglobin A1C of 6.5  Increase Lantus and write for mealtime coverage given the patient is on steroids with underlying diabetes    Essential hypertension  Assessment & Plan  Continue amlodipine, Aldactone    * Shortness of breath  Assessment & Plan  Patient presented to the ED with complaints of worsening shortness of breath x 1 week. BNP 48.  CT imaging negative for volume overload, BNP negative. Procalcitonin negative. Covid/Flu/RSV Negative  CT Chest (10/11/23): Unremarkable CT scan of the chest with only minor compressive atelectasis at the left lung base due to an elevated left hemidiaphragm   Echo (10/12/23): Left ventricular cavity size is normal. Wall thickness is mildly increased. The left ventricular ejection fraction is 60%.  Systolic function is normal.  Wall motion is normal. Diastolic function is mildly abnormal, consistent with grade I (abnormal) relaxation. Currently on  RA, %  Likely that shortness of breath related to COPD that was exacerbated by a virus. Continue with steroids for now             VTE Pharmacologic Prophylaxis:   Pharmacologic: Heparin  Mechanical VTE Prophylaxis in Place: Yes    Patient Centered Rounds: I have performed bedside rounds with nursing staff today. Time Spent for Care: 20 minutes. More than 50% of total time spent on counseling and coordination of care as described above. Current Length of Stay: 3 day(s)    Current Patient Status: Inpatient   Certification Statement: The patient will continue to require additional inpatient hospital stay due to still being short of breath in need of IV steroids    Discharge Plan: Hopeful discharge tomorrow    Code Status: Level 1 - Full Code      Subjective:   Patient seen and examined. States he still has a little bit of cough but feels slightly better than yesterday    Objective:     Vitals:   Temp (24hrs), Av.4 °F (36.9 °C), Min:98.3 °F (36.8 °C), Max:98.4 °F (36.9 °C)    Temp:  [98.3 °F (36.8 °C)-98.4 °F (36.9 °C)] 98.4 °F (36.9 °C)  HR:  [79-89] 83  Resp:  [17-18] 17  BP: (122-143)/(71-81) 143/81  SpO2:  [90 %-98 %] 90 %  Body mass index is 36.54 kg/m². Input and Output Summary (last 24 hours):        Intake/Output Summary (Last 24 hours) at 10/15/2023 1255  Last data filed at 10/15/2023 0804  Gross per 24 hour   Intake 480 ml   Output 4050 ml   Net -3570 ml       Physical Exam:     Physical Exam  (   General Appearance:    Alert, cooperative, no distress, appears stated age                               Lungs:   Mild diffuse expiratory wheezes       Heart:    Regular rate and rhythm, S1 and S2 normal, no murmur, rub    or gallop   Abdomen:     Soft, non-tender, bowel sounds active all four quadrants,     no masses, no organomegaly           Extremities:   Extremities normal, atraumatic, no cyanosis or edema Additional Data:     Labs:    Results from last 7 days   Lab Units 10/13/23  0611 10/12/23  0455 10/11/23  1605   WBC Thousand/uL 5.75   < > 6.55   HEMOGLOBIN g/dL 9.8*   < > 10.5*   HEMATOCRIT % 31.4*   < > 33.6*   PLATELETS Thousands/uL 178   < > 207   NEUTROS PCT %  --   --  69   LYMPHS PCT %  --   --  16   MONOS PCT %  --   --  8   EOS PCT %  --   --  5    < > = values in this interval not displayed. Results from last 7 days   Lab Units 10/15/23  1128 10/12/23  0455 10/11/23  1605   SODIUM mmol/L 132*   < > 139   POTASSIUM mmol/L 5.1   < > 4.4   CHLORIDE mmol/L 100   < > 107   CO2 mmol/L 25   < > 23   BUN mg/dL 60*   < > 37*   CREATININE mg/dL 2.69*   < > 2.68*   ANION GAP mmol/L 7   < > 9   CALCIUM mg/dL 10.0   < > 9.7   ALBUMIN g/dL  --   --  4.4   TOTAL BILIRUBIN mg/dL  --   --  0.70   ALK PHOS U/L  --   --  69   ALT U/L  --   --  15   AST U/L  --   --  14   GLUCOSE RANDOM mg/dL 270*   < > 84    < > = values in this interval not displayed. Results from last 7 days   Lab Units 10/11/23  1639   INR  1.11     Results from last 7 days   Lab Units 10/15/23  1109 10/15/23  0755 10/14/23  2036 10/14/23  1553 10/14/23  1034 10/14/23  0654 10/13/23  2130 10/13/23  1550 10/13/23  1059 10/13/23  0649 10/12/23  2046 10/12/23  1840   POC GLUCOSE mg/dl 277* 245* 286* 277* 307* 292* 281* 291* 310* 313* 347* 228*         Results from last 7 days   Lab Units 10/13/23  0611 10/11/23  1639   PROCALCITONIN ng/ml 0.10 0.13           * I Have Reviewed All Lab Data Listed Above. * Additional Pertinent Lab Tests Reviewed:  All [facility] Admission Reviewed    I    Recent Cultures (last 7 days):           Last 24 Hours Medication List:   Current Facility-Administered Medications   Medication Dose Route Frequency Provider Last Rate    acetaminophen  650 mg Oral Q6H PRN Luna Brooks MD      amLODIPine  10 mg Oral Daily Luna Brooks MD      aspirin  81 mg Oral Daily Luna Brooks MD atorvastatin  20 mg Oral Daily With Dinner Sharon Rosado MD      brimonidine tartrate  1 drop Both Eyes TID Sharon Rosado MD      budesonide-formoterol  2 puff Inhalation BID Sharon Rosado MD      clonazePAM  0.5 mg Oral Daily PRN Sharon Rosado MD      cloNIDine  0.1 mg Oral BID Sharon Rosado MD      Diclofenac Sodium  2 g Topical 4x Daily PRN Az Leal PA-C      docusate sodium  100 mg Oral BID MARS Ballard      gabapentin  300 mg Oral BID Sharon Rosado MD      glycerin-hypromellose-  1 drop Both Eyes Q6H PRN Sharon Rosado MD      guaiFENesin  600 mg Oral Q12H [address] [facility]      heparin (porcine)  5,000 Units Subcutaneous Q8H [address] Sharon Rosado MD      HYDROcodone Bit-Homatrop MBr  5 mL Oral Q4H PRN Ignacio Bowles MD      insulin glargine  50 Units Subcutaneous HS Ignacio Bowles MD      insulin lispro  1-6 Units Subcutaneous TID AC Sharon Rosado MD      insulin lispro  1-6 Units Subcutaneous HS Amparo Alejandre PA-C      insulin lispro  5 Units Subcutaneous TID With Meals Ignacio Bolwes MD      ketotifen  1 drop Both Eyes BID Sharon Rosado MD      latanoprost  1 drop Both Eyes HS Sharon Rosado MD      levalbuterol  1.25 mg Nebulization Q8H PRN MARS Ballard      methylPREDNISolone sodium succinate  40 mg Intravenous Q8H [address]  Ignacio Bowles MD      ondansetron  4 mg Intravenous Q6H PRN Sharon Rosado MD      pantoprazole  40 mg Intravenous Q12H [address]  MARS Gipson      PARoxetine  30 mg Oral QAM Sharon Rosado MD      prednisoLONE acetate  1 drop Both Eyes HS Sharon Rosado MD      spironolactone  25 mg Oral Daily Sharon Rosado MD      traZODone  50 mg Oral HS Sharon Rosado MD          Today, Patient Was Seen By: Billy Mackenzie MD    ** Please Note: Dictation voice to text software may have been used in the creation of this document.  **

## 2023-10-15 NOTE — ASSESSMENT & PLAN NOTE
History of COPD on baseline 3 L oxygen. Patient does have some mild wheezing  Increase the steroids yesterday.   I am going to continue with that increased dose

## 2023-10-16 LAB
ANION GAP SERPL CALCULATED.3IONS-SCNC: 7 MMOL/L
BUN SERPL-MCNC: 64 MG/DL (ref 5–25)
CALCIUM SERPL-MCNC: 9.5 MG/DL (ref 8.4–10.2)
CHLORIDE SERPL-SCNC: 100 MMOL/L (ref 96–108)
CO2 SERPL-SCNC: 25 MMOL/L (ref 21–32)
CREAT SERPL-MCNC: 2.7 MG/DL (ref 0.6–1.3)
ERYTHROCYTE [DISTWIDTH] IN BLOOD BY AUTOMATED COUNT: 14.9 % (ref 11.6–15.1)
GFR SERPL CREATININE-BSD FRML MDRD: 21 ML/MIN/1.73SQ M
GLUCOSE SERPL-MCNC: 259 MG/DL (ref 65–140)
GLUCOSE SERPL-MCNC: 283 MG/DL (ref 65–140)
GLUCOSE SERPL-MCNC: 304 MG/DL (ref 65–140)
GLUCOSE SERPL-MCNC: 307 MG/DL (ref 65–140)
GLUCOSE SERPL-MCNC: 307 MG/DL (ref 65–140)
HCT VFR BLD AUTO: 30.5 % (ref 36.5–49.3)
HGB BLD-MCNC: 9.9 G/DL (ref 12–17)
MCH RBC QN AUTO: 27.7 PG (ref 26.8–34.3)
MCHC RBC AUTO-ENTMCNC: 32.5 G/DL (ref 31.4–37.4)
MCV RBC AUTO: 85 FL (ref 82–98)
PLATELET # BLD AUTO: 150 THOUSANDS/UL (ref 149–390)
PMV BLD AUTO: 11 FL (ref 8.9–12.7)
POTASSIUM SERPL-SCNC: 5.3 MMOL/L (ref 3.5–5.3)
RBC # BLD AUTO: 3.58 MILLION/UL (ref 3.88–5.62)
SODIUM SERPL-SCNC: 132 MMOL/L (ref 135–147)
WBC # BLD AUTO: 7.62 THOUSAND/UL (ref 4.31–10.16)

## 2023-10-16 PROCEDURE — 94660 CPAP INITIATION&MGMT: CPT

## 2023-10-16 PROCEDURE — 80048 BASIC METABOLIC PNL TOTAL CA: CPT | Performed by: FAMILY MEDICINE

## 2023-10-16 PROCEDURE — C9113 INJ PANTOPRAZOLE SODIUM, VIA: HCPCS | Performed by: NURSE PRACTITIONER

## 2023-10-16 PROCEDURE — 99233 SBSQ HOSP IP/OBS HIGH 50: CPT | Performed by: FAMILY MEDICINE

## 2023-10-16 PROCEDURE — 82948 REAGENT STRIP/BLOOD GLUCOSE: CPT

## 2023-10-16 PROCEDURE — 94664 DEMO&/EVAL PT USE INHALER: CPT

## 2023-10-16 PROCEDURE — 94760 N-INVAS EAR/PLS OXIMETRY 1: CPT

## 2023-10-16 PROCEDURE — 85027 COMPLETE CBC AUTOMATED: CPT | Performed by: FAMILY MEDICINE

## 2023-10-16 PROCEDURE — 94761 N-INVAS EAR/PLS OXIMETRY MLT: CPT

## 2023-10-16 RX ORDER — METHYLPREDNISOLONE SODIUM SUCCINATE 40 MG/ML
40 INJECTION, POWDER, LYOPHILIZED, FOR SOLUTION INTRAMUSCULAR; INTRAVENOUS EVERY 12 HOURS SCHEDULED
Status: DISCONTINUED | OUTPATIENT
Start: 2023-10-16 | End: 2023-10-17

## 2023-10-16 RX ORDER — INSULIN GLARGINE 100 [IU]/ML
60 INJECTION, SOLUTION SUBCUTANEOUS
Status: DISCONTINUED | OUTPATIENT
Start: 2023-10-16 | End: 2023-10-18 | Stop reason: HOSPADM

## 2023-10-16 RX ADMIN — INSULIN LISPRO 4 UNITS: 100 INJECTION, SOLUTION INTRAVENOUS; SUBCUTANEOUS at 12:17

## 2023-10-16 RX ADMIN — METHYLPREDNISOLONE SODIUM SUCCINATE 40 MG: 40 INJECTION, POWDER, FOR SOLUTION INTRAMUSCULAR; INTRAVENOUS at 20:53

## 2023-10-16 RX ADMIN — HEPARIN SODIUM 5000 UNITS: 5000 INJECTION INTRAVENOUS; SUBCUTANEOUS at 22:00

## 2023-10-16 RX ADMIN — CLONIDINE HYDROCHLORIDE 0.1 MG: 0.1 TABLET ORAL at 17:00

## 2023-10-16 RX ADMIN — BRIMONIDINE TARTRATE 1 DROP: 2 SOLUTION/ DROPS OPHTHALMIC at 22:00

## 2023-10-16 RX ADMIN — DOCUSATE SODIUM 100 MG: 100 CAPSULE, LIQUID FILLED ORAL at 17:00

## 2023-10-16 RX ADMIN — KETOTIFEN FUMARATE 1 DROP: 0.25 SOLUTION/ DROPS OPHTHALMIC at 08:37

## 2023-10-16 RX ADMIN — INSULIN LISPRO 3 UNITS: 100 INJECTION, SOLUTION INTRAVENOUS; SUBCUTANEOUS at 21:59

## 2023-10-16 RX ADMIN — DOCUSATE SODIUM 100 MG: 100 CAPSULE, LIQUID FILLED ORAL at 08:47

## 2023-10-16 RX ADMIN — TRAZODONE HYDROCHLORIDE 50 MG: 50 TABLET ORAL at 22:00

## 2023-10-16 RX ADMIN — INSULIN LISPRO 5 UNITS: 100 INJECTION, SOLUTION INTRAVENOUS; SUBCUTANEOUS at 07:30

## 2023-10-16 RX ADMIN — HEPARIN SODIUM 5000 UNITS: 5000 INJECTION INTRAVENOUS; SUBCUTANEOUS at 05:37

## 2023-10-16 RX ADMIN — INSULIN LISPRO 4 UNITS: 100 INJECTION, SOLUTION INTRAVENOUS; SUBCUTANEOUS at 07:29

## 2023-10-16 RX ADMIN — LATANOPROST 1 DROP: 50 SOLUTION OPHTHALMIC at 22:12

## 2023-10-16 RX ADMIN — INSULIN LISPRO 5 UNITS: 100 INJECTION, SOLUTION INTRAVENOUS; SUBCUTANEOUS at 16:41

## 2023-10-16 RX ADMIN — METHYLPREDNISOLONE SODIUM SUCCINATE 40 MG: 40 INJECTION, POWDER, FOR SOLUTION INTRAMUSCULAR; INTRAVENOUS at 05:37

## 2023-10-16 RX ADMIN — GUAIFENESIN 600 MG: 600 TABLET ORAL at 22:00

## 2023-10-16 RX ADMIN — ASPIRIN 81 MG: 81 TABLET, COATED ORAL at 08:47

## 2023-10-16 RX ADMIN — KETOTIFEN FUMARATE 1 DROP: 0.25 SOLUTION/ DROPS OPHTHALMIC at 17:00

## 2023-10-16 RX ADMIN — GABAPENTIN 300 MG: 300 CAPSULE ORAL at 17:00

## 2023-10-16 RX ADMIN — BUDESONIDE AND FORMOTEROL FUMARATE DIHYDRATE 2 PUFF: 160; 4.5 AEROSOL RESPIRATORY (INHALATION) at 22:17

## 2023-10-16 RX ADMIN — PANTOPRAZOLE SODIUM 40 MG: 40 INJECTION, POWDER, FOR SOLUTION INTRAVENOUS at 20:53

## 2023-10-16 RX ADMIN — INSULIN LISPRO 5 UNITS: 100 INJECTION, SOLUTION INTRAVENOUS; SUBCUTANEOUS at 12:17

## 2023-10-16 RX ADMIN — BUDESONIDE AND FORMOTEROL FUMARATE DIHYDRATE 2 PUFF: 160; 4.5 AEROSOL RESPIRATORY (INHALATION) at 08:37

## 2023-10-16 RX ADMIN — BRIMONIDINE TARTRATE 1 DROP: 2 SOLUTION/ DROPS OPHTHALMIC at 08:37

## 2023-10-16 RX ADMIN — PANTOPRAZOLE SODIUM 40 MG: 40 INJECTION, POWDER, FOR SOLUTION INTRAVENOUS at 08:47

## 2023-10-16 RX ADMIN — INSULIN LISPRO 4 UNITS: 100 INJECTION, SOLUTION INTRAVENOUS; SUBCUTANEOUS at 16:41

## 2023-10-16 RX ADMIN — PREDNISOLONE ACETATE 1 DROP: 10 SUSPENSION/ DROPS OPHTHALMIC at 22:13

## 2023-10-16 RX ADMIN — SPIRONOLACTONE 25 MG: 25 TABLET, COATED ORAL at 08:47

## 2023-10-16 RX ADMIN — ATORVASTATIN CALCIUM 20 MG: 20 TABLET, FILM COATED ORAL at 16:44

## 2023-10-16 RX ADMIN — GABAPENTIN 300 MG: 300 CAPSULE ORAL at 08:48

## 2023-10-16 RX ADMIN — INSULIN GLARGINE 60 UNITS: 100 INJECTION, SOLUTION SUBCUTANEOUS at 22:00

## 2023-10-16 RX ADMIN — BRIMONIDINE TARTRATE 1 DROP: 2 SOLUTION/ DROPS OPHTHALMIC at 16:43

## 2023-10-16 RX ADMIN — CLONIDINE HYDROCHLORIDE 0.1 MG: 0.1 TABLET ORAL at 08:47

## 2023-10-16 RX ADMIN — PAROXETINE HYDROCHLORIDE 30 MG: 20 TABLET, FILM COATED ORAL at 08:47

## 2023-10-16 RX ADMIN — AMLODIPINE BESYLATE 10 MG: 10 TABLET ORAL at 08:47

## 2023-10-16 RX ADMIN — CLONAZEPAM 0.5 MG: 0.5 TABLET ORAL at 22:18

## 2023-10-16 RX ADMIN — GUAIFENESIN 600 MG: 600 TABLET ORAL at 08:47

## 2023-10-16 NOTE — CASE MANAGEMENT
Case Management Discharge Planning Note    Patient name Devin Ford  Location [address] [facility] [place] 322/-65 MRN [id number]  : 1945 Date 10/16/2023       Current Admission Date: 10/11/2023  Current Admission Diagnosis:Shortness of breath   Patient Active Problem List    Diagnosis Date Noted    Shortness of breath 10/11/2023    Chronic respiratory failure with hypoxia ([address]) 10/11/2023    Dilatation of thoracic aorta ([address]) 06/15/2023    Malignant neoplasm of lower lobe of left lung ([address]) 03/15/2023    Recurrent major depressive disorder, in remission ([address]) 03/15/2023    Chronic obstructive pulmonary disease, unspecified COPD type ([address]) 2022    Cardiomyopathy, unspecified type ([address]) 2022    Bronchospasm 2021    Stage 4 chronic kidney disease ([address]) 2021    Obesity, morbid ([address]) 2021    Primary hyperaldosteronism ([address]) 2021    Hyperlipidemia     Prostate cancer ([address]) 2020    Depression 2020    Essential hypertension 2020    Low back pain 2020    Type 2 diabetes mellitus without complication, with long-term current use of insulin ([address]) 2020      LOS (days): 4  Geometric Mean LOS (GMLOS) (days): 2.10  Days to GMLOS:-2.1     OBJECTIVE:  Risk of Unplanned Readmission Score: 25.7         Current admission status: Inpatient   Preferred Pharmacy:   [address] [place] [address], [address] [place] [address]  Phone: [phone] Fax: [phone]    Primary Care Provider: Roxane Fleming MD    Primary Insurance: [employer]  Secondary Insurance: [facility] REP    DISCHARGE DETAILS:     CM submitted for O2 through [facility] and cm was informed that Patient needs to go through [place] due to [employer]. [facility] and [facility] information for Patients PCP office to call CM.      CM faxed O2 information to [phone]

## 2023-10-16 NOTE — RESPIRATORY THERAPY NOTE
Home Oxygen Qualifying Test     Patient name: Celina Chawla        :    Date of Test:  2023  Diagnosis:    Home Oxygen Test:    **Medicare Guidelines require item(s) 1-5 on all ambulatory patients or 1 and 2 on non-ambulatory patients. 1. Baseline SPO2 on Room Air at rest 96 %       SPO2 during exertion on Room Air 86  %  During exertion monitor SPO2. If SPO2 increases >=89%, do not add supplemental oxygen    SPO2 on Oxygen at Rest n/a    SPO2 during exertion on Oxygen 94% at 2LPM    Test performed during exertion activity. [x]  Supplemental Home Oxygen is indicated. []  Client does not qualify for home oxygen.     Respiratory Additional Notes- pt will require 2L nc during exertion    Anthony De Los Santos RT

## 2023-10-16 NOTE — ASSESSMENT & PLAN NOTE
History of COPD and he is currently not on oxygen. He was on it at 1 point. Patient does have some mild wheezing  I did start decreasing the dose today. However the patient needs oxygen with ambulation so case management is aware.   Unfortunately the patient does have the [place] insurance so it may take a day or 2 for them to get it but the patient is medically stable for discharge on a steroid taper but given that we need oxygen on discharge patient is here

## 2023-10-16 NOTE — ASSESSMENT & PLAN NOTE
Patient presented to the ED with complaints of worsening shortness of breath x 1 week. BNP 48.  CT imaging negative for volume overload, BNP negative. Procalcitonin negative. Covid/Flu/RSV Negative  CT Chest (10/11/23): Unremarkable CT scan of the chest with only minor compressive atelectasis at the left lung base due to an elevated left hemidiaphragm   Echo (10/12/23): Left ventricular cavity size is normal. Wall thickness is mildly increased. The left ventricular ejection fraction is 60%. Systolic function is normal.  Wall motion is normal. Diastolic function is mildly abnormal, consistent with grade I (abnormal) relaxation. Currently on  RA, %  Shortness of breath is improving.   Steroids have been decreased

## 2023-10-16 NOTE — PROGRESS NOTES
[address]  Progress Note  Name: Vance Khan  MRN: [id number]  Unit/Bed#: -01 I Date of Admission: 10/11/2023   Date of Service: 10/16/2023 I Hospital Day: 4    Assessment/Plan   Chronic respiratory failure with hypoxia [facility])  Assessment & Plan  Patient at 1 point was on oxygen. He does have BiPAP at home    Malignant neoplasm of lower lobe of left lung [facility])  Assessment & Plan  Outpatient follow-up    Chronic obstructive pulmonary disease, unspecified COPD type ([address])  Assessment & Plan  History of COPD and he is currently not on oxygen. He was on it at 1 point. Patient does have some mild wheezing  I did start decreasing the dose today. However the patient needs oxygen with ambulation so case management is aware. Unfortunately the patient does have the [place] insurance so it may take a day or 2 for them to get it but the patient is medically stable for discharge on a steroid taper but given that we need oxygen on discharge patient is here    Stage 4 chronic kidney disease ([address])  Assessment & Plan  History of CKD stg 4; with baseline creatinine likely around 2.2-2.5, no updated labs this year. Creatinine is stable. Continue to monitor intermittently    Primary hyperaldosteronism ([address])  Assessment & Plan  Continue Aldactone    Type 2 diabetes mellitus without complication, with long-term current use of insulin ([facility])  Assessment & Plan  Chronic, controlled, as evidenced by a hemoglobin A1C of 6.5  Increase Lantus and write for mealtime coverage given the patient is on steroids with underlying diabetes    Essential hypertension  Assessment & Plan  Continue amlodipine, Aldactone    * Shortness of breath  Assessment & Plan  Patient presented to the ED with complaints of worsening shortness of breath x 1 week. BNP 48.  CT imaging negative for volume overload, BNP negative. Procalcitonin negative. Covid/Flu/RSV Negative  CT Chest (10/11/23):  Unremarkable CT scan of the chest with only minor compressive atelectasis at the left lung base due to an elevated left hemidiaphragm   Echo (10/12/23): Left ventricular cavity size is normal. Wall thickness is mildly increased. The left ventricular ejection fraction is 60%. Systolic function is normal.  Wall motion is normal. Diastolic function is mildly abnormal, consistent with grade I (abnormal) relaxation. Currently on  RA, %  Shortness of breath is improving. Steroids have been decreased         VTE Pharmacologic Prophylaxis:   Pharmacologic: Heparin  Mechanical VTE Prophylaxis in Place: Yes    Patient Centered Rounds: I have performed bedside rounds with nursing staff today. Discussions with Specialists or Other Care Team Provider: Case management    Education and Discussions with Family / Patient: Wife was on the phone when I was in the patient's room    Time Spent for Care: 30 minutes. More than 50% of total time spent on counseling and coordination of care as described above. Current Length of Stay: 4 day(s)    Current Patient Status: Inpatient   Certification Statement: The patient will continue to require additional inpatient hospital stay due to needing oxygen to be set up at home    Discharge Plan: All depends on the [facility] when they can get the oxygen. The patient is medically stable for discharge    Code Status: Level 1 - Full Code      Subjective:   Patient seen and examined. No acute events reported. Patient states he is feeling slightly better    Objective:     Vitals:   Temp (24hrs), Av.4 °F (36.9 °C), Min:98.3 °F (36.8 °C), Max:98.5 °F (36.9 °C)    Temp:  [98.3 °F (36.8 °C)-98.5 °F (36.9 °C)] 98.3 °F (36.8 °C)  HR:  [73-84] 84  BP: (130-132)/(72-75) 130/72  SpO2:  [87 %-96 %] 93 %  Body mass index is 36.53 kg/m². Input and Output Summary (last 24 hours):        Intake/Output Summary (Last 24 hours) at 10/16/2023 1601  Last data filed at 10/16/2023 0762  Gross per 24 hour   Intake 240 ml   Output 1525 ml   Net -1285 ml       Physical Exam:     Physical Exam  (   General Appearance:    Alert, cooperative, no distress, appears stated age                               Lungs:     Clear to auscultation bilaterally, respirations unlabored       Heart:    Regular rate and rhythm, S1 and S2 normal, no murmur, rub    or gallop   Abdomen:     Soft, non-tender, bowel sounds active all four quadrants,     no masses, no organomegaly           Extremities:   Extremities normal, atraumatic, no cyanosis or edema       Additional Data:     Labs:    Results from last 7 days   Lab Units 10/16/23  0519 10/12/23  0455 10/11/23  1605   WBC Thousand/uL 7.62   < > 6.55   HEMOGLOBIN g/dL 9.9*   < > 10.5*   HEMATOCRIT % 30.5*   < > 33.6*   PLATELETS Thousands/uL 150   < > 207   NEUTROS PCT %  --   --  69   LYMPHS PCT %  --   --  16   MONOS PCT %  --   --  8   EOS PCT %  --   --  5    < > = values in this interval not displayed. Results from last 7 days   Lab Units 10/16/23  0519 10/12/23  0455 10/11/23  1605   SODIUM mmol/L 132*   < > 139   POTASSIUM mmol/L 5.3   < > 4.4   CHLORIDE mmol/L 100   < > 107   CO2 mmol/L 25   < > 23   BUN mg/dL 64*   < > 37*   CREATININE mg/dL 2.70*   < > 2.68*   ANION GAP mmol/L 7   < > 9   CALCIUM mg/dL 9.5   < > 9.7   ALBUMIN g/dL  --   --  4.4   TOTAL BILIRUBIN mg/dL  --   --  0.70   ALK PHOS U/L  --   --  69   ALT U/L  --   --  15   AST U/L  --   --  14   GLUCOSE RANDOM mg/dL 307*   < > 84    < > = values in this interval not displayed.      Results from last 7 days   Lab Units 10/11/23  1639   INR  1.11     Results from last 7 days   Lab Units 10/16/23  1109 10/16/23  0723 10/15/23  2117 10/15/23  1544 10/15/23  1109 10/15/23  0755 10/14/23  2036 10/14/23  1553 10/14/23  1034 10/14/23  0654 10/13/23  2130 10/13/23  1550   POC GLUCOSE mg/dl 307* 283* 325* 243* 277* 245* 286* 277* 307* 292* 281* 291*         Results from last 7 days   Lab Units 10/13/23  0611 10/11/23  1639   PROCALCITONIN ng/ml 0.10 0.13 * I Have Reviewed All Lab Data Listed Above. * Additional Pertinent Lab Tests Reviewed:  [address] Admission Reviewed        Recent Cultures (last 7 days):           Last 24 Hours Medication List:   Current Facility-Administered Medications   Medication Dose Route Frequency Provider Last Rate    acetaminophen  650 mg Oral Q6H PRN Michael Hernandez MD      amLODIPine  10 mg Oral Daily Michael Hernandez MD      aspirin  81 mg Oral Daily Michael Hernandez MD      atorvastatin  20 mg Oral Daily With Dinner Michael Hernandez MD      brimonidine tartrate  1 drop Both Eyes TID Michael Hernandez MD      budesonide-formoterol  2 puff Inhalation BID Michael Hernandez MD      clonazePAM  0.5 mg Oral Daily PRN Michael Hernandez MD      cloNIDine  0.1 mg Oral BID Michael Hernandez MD      Diclofenac Sodium  2 g Topical 4x Daily PRN Marc Goldmann Prator, PA-C      docusate sodium  100 mg Oral BID MARS Beasley      gabapentin  300 mg Oral BID Michael Hernandez MD      glycerin-hypromellose-  1 drop Both Eyes Q6H PRN Michael Hernandez MD      guaiFENesin  600 mg Oral Q12H [address] MARS Gipson      heparin (porcine)  5,000 Units Subcutaneous Q8H [address] Michael Hernandez MD      HYDROcodone Bit-Homatrop MBr  5 mL Oral Q4H PRN Ignacio Senior MD      insulin glargine  60 Units Subcutaneous HS Ignacio Senior MD      insulin lispro  1-6 Units Subcutaneous TID AC Michael Hernandez MD      insulin lispro  1-6 Units Subcutaneous HS Amparo Alejandre PA-C      insulin lispro  5 Units Subcutaneous TID With Meals Ignacio Senior MD      ketotifen  1 drop Both Eyes BID Michael Hernandez MD      latanoprost  1 drop Both Eyes HS Michael Hernandez MD      levalbuterol  1.25 mg Nebulization Q8H PRN MARS Beasley      methylPREDNISolone sodium succinate  40 mg Intravenous Q12H [address] Ignacio Senior MD      ondansetron  4 mg Intravenous Q6H PRN Laura Sullivan MD      pantoprazole  40 mg Intravenous Q12H [facility] MARS Gipson      PARoxetine  30 mg Oral QAM Laura Sullivan MD      prednisoLONE acetate  1 drop Both Eyes HS Laura Sullivan MD      spironolactone  25 mg Oral Daily Laura Sullivan MD      traZODone  50 mg Oral HS Laura Sullivan MD          Today, Patient Was Seen By: Nilton Jeong MD    ** Please Note: Dictation voice to text software may have been used in the creation of this document.  **

## 2023-10-16 NOTE — RESPIRATORY THERAPY NOTE
RT Protocol Note  Jeet Ventura 70 y.o. male MRN: [id number]  Unit/Bed#: -01 Encounter: [id number]    Assessment    Principal Problem:    Shortness of breath  Active Problems:    Essential hypertension    Type 2 diabetes mellitus without complication, with long-term current use of insulin (HCC)    Primary hyperaldosteronism (HCC)    Stage 4 chronic kidney disease (HCC)    Chronic obstructive pulmonary disease, unspecified COPD type ([facility])    Malignant neoplasm of lower lobe of left lung (HCC)    Chronic respiratory failure with hypoxia ([facility])      Home Pulmonary Medications:    Home Devices/Therapy: BiPAP/CPAP, Home O2    Past Medical History:   Diagnosis Date    Cancer ([facility])     PROSTATE CANCER    Colon polyp     Hyperlipidemia     PTSD (post-traumatic stress disorder)     Type 2 diabetes mellitus without complication, with long-term current use of insulin ([address] [place] [address]) 2020    Type 2 diabetes mellitus without complication, with long-term current use of insulin ([facility]) 2020     Social History     Socioeconomic History    Marital status: /Civil Union     Spouse name: None    Number of children: None    Years of education: None    Highest education level: None   Occupational History    None   Tobacco Use    Smoking status: Never    Smokeless tobacco: Never   Vaping Use    Vaping Use: Never used   Substance and Sexual Activity    Alcohol use: Not Currently    Drug use: Never    Sexual activity: None   Other Topics Concern    None   Social History Narrative    Most recent tobacco use screenin-      Do you currently or have you served in the [address] [place] [address]:   Yes      If Yes, What branch of service:   [employer]      Were you activated, into active duty, as a member of the [employer] or as a Reservist:   No      Marital status:         Exercise level:    Moderate      Diet:   Regular      General stress level:   High      Alcohol intake:   None      Caffeine intake:   None      Seat belts used routinely:   Yes      Sunscreen used routinely:   Yes      Smoke alarm in home:   Yes      Advance directive:   Yes      Social Determinants of Health     Financial Resource Strain: High Risk (9/15/2023)    Overall Financial Resource Strain (CARDIA)     Difficulty of Paying Living Expenses: Hard   Food Insecurity: No Food Insecurity (10/13/2023)    Hunger Vital Sign     Worried About Running Out of Food in the Last Year: Never true     Ran Out of Food in the Last Year: Never true   Transportation Needs: No Transportation Needs (10/13/2023)    PRAPARE - Transportation     Lack of Transportation (Medical): No     Lack of Transportation (Non-Medical): No   Physical Activity: Not on file   Stress: Not on file   Social Connections: Not on file   Intimate Partner Violence: Not on file   Housing Stability: Low Risk  (10/13/2023)    Housing Stability Vital Sign     Unable to Pay for Housing in the Last Year: No     Number of Places Lived in the Last Year: 1     Unstable Housing in the Last Year: No       Subjective         Objective    Physical Exam:        Vitals:  Blood pressure 130/72, pulse 84, temperature 98.3 °F (36.8 °C), resp. rate 16, height 5' 7" (1.702 m), weight 106 kg (233 lb 4 oz), SpO2 95 %. Imaging and other studies: I have personally reviewed pertinent reports.       O2 Device: [email or web address]     Plan    Respiratory Plan: No distress/Pulmonary history        Resp Comments: will continue with xop prn

## 2023-10-16 NOTE — CASE MANAGEMENT
Case Management Discharge Planning Note    Patient name Burt Kellogg  Location [address] [facility] 322/-79 MRN [id number]  : 1945 Date 10/16/2023       Current Admission Date: 10/11/2023  Current Admission Diagnosis:Shortness of breath   Patient Active Problem List    Diagnosis Date Noted    Shortness of breath 10/11/2023    Chronic respiratory failure with hypoxia ([address]) 10/11/2023    Dilatation of thoracic aorta ([address]) 06/15/2023    Malignant neoplasm of lower lobe of left lung ([address]) 03/15/2023    Recurrent major depressive disorder, in remission ([address]) 03/15/2023    Chronic obstructive pulmonary disease, unspecified COPD type ([address]) 2022    Cardiomyopathy, unspecified type ([address]) 2022    Bronchospasm 2021    Stage 4 chronic kidney disease ([address]) 2021    Obesity, morbid ([address]) 2021    Primary hyperaldosteronism ([address]) 2021    Hyperlipidemia     Prostate cancer ([address]) 2020    Depression 2020    Essential hypertension 2020    Low back pain 2020    Type 2 diabetes mellitus without complication, with long-term current use of insulin ([address]) 2020      LOS (days): 4  Geometric Mean LOS (GMLOS) (days): 2.10  Days to GMLOS:-2     OBJECTIVE:  Risk of Unplanned Readmission Score: 25.7         Current admission status: Inpatient   Preferred Pharmacy:   [address] [place] [address], [address] [place] [address]  Phone: [phone] Fax: [phone]    Primary Care Provider: Emily Chance MD    Primary Insurance: Malini  Secondary Insurance: [facility] REP    DISCHARGE DETAILS:       DME Referral Provided  Referral made for DME?: Yes  DME referral completed for the following items[de-identified] Home Oxygen concentrator (CM submitted for O2 for patient)  DME Supplier Name[de-identified] [employer]    Other Referral/Resources/Interventions Provided:  Interventions: DME  Referral Comments: CM submitted for O2 for patient.     Would you like to participate in our [facility] [employer] service program?  : No - Declined    Treatment Team Recommendation: Home with [address]   Discharge Destination Plan[de-identified] Home with [address] at Discharge : Family

## 2023-10-16 NOTE — PLAN OF CARE
Problem: Potential for Falls  Goal: Patient will remain free of falls  Description: INTERVENTIONS:  - Educate patient/family on patient safety including physical limitations  - Instruct patient to call for assistance with activity   - Consult OT/PT to assist with strengthening/mobility   - Keep Call bell within reach  - Keep bed low and locked with side rails adjusted as appropriate  - Keep care items and personal belongings within reach  - Initiate and maintain comfort rounds  - Make Fall Risk Sign visible to staff  Problem: METABOLIC, FLUID AND ELECTROLYTES - ADULT  Goal: Glucose maintained within target range  Description: INTERVENTIONS:  - Monitor Blood Glucose as ordered  - Assess for signs and symptoms of hyperglycemia and hypoglycemia  - Administer ordered medications to maintain glucose within target range  - Assess nutritional intake and initiate nutrition service referral as needed  Outcome: Progressing     - Apply yellow socks and bracelet for high fall risk patients  - Consider moving patient to room near nurses station  Outcome: Progressing

## 2023-10-17 LAB
ANION GAP SERPL CALCULATED.3IONS-SCNC: 8 MMOL/L
BUN SERPL-MCNC: 67 MG/DL (ref 5–25)
CALCIUM SERPL-MCNC: 9.6 MG/DL (ref 8.4–10.2)
CHLORIDE SERPL-SCNC: 100 MMOL/L (ref 96–108)
CO2 SERPL-SCNC: 24 MMOL/L (ref 21–32)
CREAT SERPL-MCNC: 2.88 MG/DL (ref 0.6–1.3)
GFR SERPL CREATININE-BSD FRML MDRD: 20 ML/MIN/1.73SQ M
GLUCOSE SERPL-MCNC: 253 MG/DL (ref 65–140)
GLUCOSE SERPL-MCNC: 262 MG/DL (ref 65–140)
GLUCOSE SERPL-MCNC: 288 MG/DL (ref 65–140)
GLUCOSE SERPL-MCNC: 293 MG/DL (ref 65–140)
GLUCOSE SERPL-MCNC: 293 MG/DL (ref 65–140)
POTASSIUM SERPL-SCNC: 5.2 MMOL/L (ref 3.5–5.3)
SODIUM SERPL-SCNC: 132 MMOL/L (ref 135–147)

## 2023-10-17 PROCEDURE — 99232 SBSQ HOSP IP/OBS MODERATE 35: CPT | Performed by: STUDENT IN AN ORGANIZED HEALTH CARE EDUCATION/TRAINING PROGRAM

## 2023-10-17 PROCEDURE — 94760 N-INVAS EAR/PLS OXIMETRY 1: CPT

## 2023-10-17 PROCEDURE — C9113 INJ PANTOPRAZOLE SODIUM, VIA: HCPCS | Performed by: NURSE PRACTITIONER

## 2023-10-17 PROCEDURE — 94660 CPAP INITIATION&MGMT: CPT

## 2023-10-17 PROCEDURE — 82948 REAGENT STRIP/BLOOD GLUCOSE: CPT

## 2023-10-17 PROCEDURE — 80048 BASIC METABOLIC PNL TOTAL CA: CPT | Performed by: FAMILY MEDICINE

## 2023-10-17 RX ORDER — INSULIN LISPRO 100 [IU]/ML
10 INJECTION, SOLUTION INTRAVENOUS; SUBCUTANEOUS
Status: DISCONTINUED | OUTPATIENT
Start: 2023-10-17 | End: 2023-10-18 | Stop reason: HOSPADM

## 2023-10-17 RX ORDER — PREDNISONE 20 MG/1
40 TABLET ORAL DAILY
Status: DISCONTINUED | OUTPATIENT
Start: 2023-10-18 | End: 2023-10-18 | Stop reason: HOSPADM

## 2023-10-17 RX ADMIN — AMLODIPINE BESYLATE 10 MG: 10 TABLET ORAL at 08:39

## 2023-10-17 RX ADMIN — HEPARIN SODIUM 5000 UNITS: 5000 INJECTION INTRAVENOUS; SUBCUTANEOUS at 22:11

## 2023-10-17 RX ADMIN — INSULIN LISPRO 4 UNITS: 100 INJECTION, SOLUTION INTRAVENOUS; SUBCUTANEOUS at 17:15

## 2023-10-17 RX ADMIN — GUAIFENESIN 600 MG: 600 TABLET ORAL at 22:30

## 2023-10-17 RX ADMIN — KETOTIFEN FUMARATE 1 DROP: 0.25 SOLUTION/ DROPS OPHTHALMIC at 08:42

## 2023-10-17 RX ADMIN — GABAPENTIN 300 MG: 300 CAPSULE ORAL at 08:39

## 2023-10-17 RX ADMIN — PANTOPRAZOLE SODIUM 40 MG: 40 INJECTION, POWDER, FOR SOLUTION INTRAVENOUS at 20:26

## 2023-10-17 RX ADMIN — INSULIN LISPRO 10 UNITS: 100 INJECTION, SOLUTION INTRAVENOUS; SUBCUTANEOUS at 17:16

## 2023-10-17 RX ADMIN — ATORVASTATIN CALCIUM 20 MG: 20 TABLET, FILM COATED ORAL at 17:16

## 2023-10-17 RX ADMIN — INSULIN LISPRO 5 UNITS: 100 INJECTION, SOLUTION INTRAVENOUS; SUBCUTANEOUS at 08:41

## 2023-10-17 RX ADMIN — GABAPENTIN 300 MG: 300 CAPSULE ORAL at 17:16

## 2023-10-17 RX ADMIN — INSULIN LISPRO 3 UNITS: 100 INJECTION, SOLUTION INTRAVENOUS; SUBCUTANEOUS at 22:15

## 2023-10-17 RX ADMIN — CLONIDINE HYDROCHLORIDE 0.1 MG: 0.1 TABLET ORAL at 08:39

## 2023-10-17 RX ADMIN — BRIMONIDINE TARTRATE 1 DROP: 2 SOLUTION/ DROPS OPHTHALMIC at 22:14

## 2023-10-17 RX ADMIN — KETOTIFEN FUMARATE 1 DROP: 0.25 SOLUTION/ DROPS OPHTHALMIC at 17:15

## 2023-10-17 RX ADMIN — CLONIDINE HYDROCHLORIDE 0.1 MG: 0.1 TABLET ORAL at 17:16

## 2023-10-17 RX ADMIN — INSULIN LISPRO 3 UNITS: 100 INJECTION, SOLUTION INTRAVENOUS; SUBCUTANEOUS at 08:40

## 2023-10-17 RX ADMIN — PREDNISOLONE ACETATE 1 DROP: 10 SUSPENSION/ DROPS OPHTHALMIC at 22:14

## 2023-10-17 RX ADMIN — INSULIN LISPRO 4 UNITS: 100 INJECTION, SOLUTION INTRAVENOUS; SUBCUTANEOUS at 11:39

## 2023-10-17 RX ADMIN — GUAIFENESIN 600 MG: 600 TABLET ORAL at 08:39

## 2023-10-17 RX ADMIN — LATANOPROST 1 DROP: 50 SOLUTION OPHTHALMIC at 22:14

## 2023-10-17 RX ADMIN — BRIMONIDINE TARTRATE 1 DROP: 2 SOLUTION/ DROPS OPHTHALMIC at 17:15

## 2023-10-17 RX ADMIN — HEPARIN SODIUM 5000 UNITS: 5000 INJECTION INTRAVENOUS; SUBCUTANEOUS at 14:49

## 2023-10-17 RX ADMIN — PAROXETINE HYDROCHLORIDE 30 MG: 20 TABLET, FILM COATED ORAL at 08:39

## 2023-10-17 RX ADMIN — BRIMONIDINE TARTRATE 1 DROP: 2 SOLUTION/ DROPS OPHTHALMIC at 08:40

## 2023-10-17 RX ADMIN — INSULIN LISPRO 5 UNITS: 100 INJECTION, SOLUTION INTRAVENOUS; SUBCUTANEOUS at 11:39

## 2023-10-17 RX ADMIN — SPIRONOLACTONE 25 MG: 25 TABLET, COATED ORAL at 08:39

## 2023-10-17 RX ADMIN — HEPARIN SODIUM 5000 UNITS: 5000 INJECTION INTRAVENOUS; SUBCUTANEOUS at 05:10

## 2023-10-17 RX ADMIN — INSULIN GLARGINE 60 UNITS: 100 INJECTION, SOLUTION SUBCUTANEOUS at 22:14

## 2023-10-17 RX ADMIN — DOCUSATE SODIUM 100 MG: 100 CAPSULE, LIQUID FILLED ORAL at 08:39

## 2023-10-17 RX ADMIN — TRAZODONE HYDROCHLORIDE 50 MG: 50 TABLET ORAL at 22:12

## 2023-10-17 RX ADMIN — PANTOPRAZOLE SODIUM 40 MG: 40 INJECTION, POWDER, FOR SOLUTION INTRAVENOUS at 08:39

## 2023-10-17 RX ADMIN — ASPIRIN 81 MG: 81 TABLET, COATED ORAL at 08:39

## 2023-10-17 RX ADMIN — CLONAZEPAM 0.5 MG: 0.5 TABLET ORAL at 22:12

## 2023-10-17 RX ADMIN — METHYLPREDNISOLONE SODIUM SUCCINATE 40 MG: 40 INJECTION, POWDER, FOR SOLUTION INTRAMUSCULAR; INTRAVENOUS at 08:39

## 2023-10-17 RX ADMIN — BUDESONIDE AND FORMOTEROL FUMARATE DIHYDRATE 2 PUFF: 160; 4.5 AEROSOL RESPIRATORY (INHALATION) at 08:40

## 2023-10-17 RX ADMIN — DOCUSATE SODIUM 100 MG: 100 CAPSULE, LIQUID FILLED ORAL at 17:16

## 2023-10-17 RX ADMIN — BUDESONIDE AND FORMOTEROL FUMARATE DIHYDRATE 2 PUFF: 160; 4.5 AEROSOL RESPIRATORY (INHALATION) at 22:12

## 2023-10-17 NOTE — ASSESSMENT & PLAN NOTE
Likely in setting of COPD exacerbation  Improved with steroids  Pending home oxygen facilitation by   Patient reports using his wife's oxygen tank  Pending authorization by [facility]

## 2023-10-17 NOTE — PROGRESS NOTES
[address]  Progress Note  Name: Shelby Escobar  MRN: [id number]  Unit/Bed#: -01 I Date of Admission: 10/11/2023   Date of Service: 10/17/2023 I Hospital Day: 5    Assessment/Plan   * Shortness of breath  Assessment & Plan  Likely in setting of COPD exacerbation  Improved with steroids  Pending home oxygen facilitation by   Patient reports using his wife's oxygen tank  [facility]     Chronic respiratory failure with hypoxia ([address])  Assessment & Plan  Requiring home oxygen  Currently buys oxygen out of pocket and shares with his wife   facilitating patient's own portable oxygen tank  [facility]ending [facility] clearance[facility]approval     Malignant neoplasm of lower lobe of left lung [facility])  Assessment & Plan  Outpatient follow-up    Chronic obstructive pulmonary disease, unspecified COPD type ([address])  Assessment & Plan  COPD exacerbation noted on 10/16  Started on steroids with improvement  Transitioned to PO prednisone  Discharge with prednisone once oxygen obtained    Stage 4 chronic kidney disease ([address])  Assessment & Plan  At baseline creatinine   Monitor for now     Type 2 diabetes mellitus without complication, with long-term current use of insulin ([address])  Assessment & Plan  Poorly controlled while admitted  Secondary to steroids for copd exacerbation  Adjust to PO prednisone  Increase lispro to 10 units from 5 units  Continue lantus 60 units qhs   Monitor     Essential hypertension  Assessment & Plan  Continue amlodipine, Aldactone               VTE Pharmacologic Prophylaxis:   Moderate Risk (Score 3-4) - Pharmacological DVT Prophylaxis Ordered: heparin. Patient Centered Rounds: I performed bedside rounds with nursing staff today. Discussions with Specialists or Other Care Team Provider: javi    Education and Discussions with Family / Patient: Updated  (sister) via phone. Total Time Spent on Date of Encounter in care of patient: 30+ mins.  This time was spent on one or more of the following: performing physical exam; counseling and coordination of care; obtaining or reviewing history; documenting in the medical record; reviewing/ordering tests, medications or procedures; communicating with other healthcare professionals and discussing with patient's family/caregivers. Current Length of Stay: 5 day(s)  Current Patient Status: Inpatient   Certification Statement: The patient will continue to require additional inpatient hospital stay due to home oxygen facilitation, [facility]  Discharge Plan: Anticipate discharge tomorrow to home. Code Status: Level 1 - Full Code    Subjective:   Patient feels well    Objective:     Vitals:   Temp (24hrs), Av.7 °F (36.5 °C), Min:97.7 °F (36.5 °C), Max:97.7 °F (36.5 °C)    Temp:  [97.7 °F (36.5 °C)] 97.7 °F (36.5 °C)  HR:  [70-84] 72  Resp:  [17] 17  BP: (129-142)/(72-88) 142/88  SpO2:  [93 %-98 %] 95 %  Body mass index is 36.74 kg/m². Input and Output Summary (last 24 hours): Intake/Output Summary (Last 24 hours) at 10/17/2023 1328  Last data filed at 10/17/2023 0900  Gross per 24 hour   Intake 340 ml   Output 3300 ml   Net -2960 ml       Physical Exam:   Physical Exam  Constitutional:       General: He is not in acute distress. Appearance: Normal appearance. He is not toxic-appearing. Cardiovascular:      Rate and Rhythm: Normal rate and regular rhythm. Heart sounds: Normal heart sounds. No murmur heard. Pulmonary:      Effort: Pulmonary effort is normal. No respiratory distress. Breath sounds: Normal breath sounds. No wheezing. Abdominal:      General: Abdomen is flat. There is no distension. Palpations: Abdomen is soft. Tenderness: There is no abdominal tenderness. Neurological:      General: No focal deficit present. Mental Status: He is alert and oriented to person, place, and time. Mental status is at baseline. Motor: No weakness. Additional Data:     Labs:  Results from last 7 days   Lab Units 10/16/23  0519 10/12/23  0455 10/11/23  1605   WBC Thousand/uL 7.62   < > 6.55   HEMOGLOBIN g/dL 9.9*   < > 10.5*   HEMATOCRIT % 30.5*   < > 33.6*   PLATELETS Thousands/uL 150   < > 207   NEUTROS PCT %  --   --  69   LYMPHS PCT %  --   --  16   MONOS PCT %  --   --  8   EOS PCT %  --   --  5    < > = values in this interval not displayed. Results from last 7 days   Lab Units 10/17/23  0501 10/12/23  0455 10/11/23  1605   SODIUM mmol/L 132*   < > 139   POTASSIUM mmol/L 5.2   < > 4.4   CHLORIDE mmol/L 100   < > 107   CO2 mmol/L 24   < > 23   BUN mg/dL 67*   < > 37*   CREATININE mg/dL 2.88*   < > 2.68*   ANION GAP mmol/L 8   < > 9   CALCIUM mg/dL 9.6   < > 9.7   ALBUMIN g/dL  --   --  4.4   TOTAL BILIRUBIN mg/dL  --   --  0.70   ALK PHOS U/L  --   --  69   ALT U/L  --   --  15   AST U/L  --   --  14   GLUCOSE RANDOM mg/dL 288*   < > 84    < > = values in this interval not displayed. Results from last 7 days   Lab Units 10/11/23  1639   INR  1.11     Results from last 7 days   Lab Units 10/17/23  1038 10/17/23  0734 10/16/23  2047 10/16/23  1614 10/16/23  1109 10/16/23  0723 10/15/23  2117 10/15/23  1544 10/15/23  1109 10/15/23  0755 10/14/23  2036 10/14/23  1553   POC GLUCOSE mg/dl 293* 253* 259* 304* 307* 283* 325* 243* 277* 245* 286* 277*         Results from last 7 days   Lab Units 10/13/23  0611 10/11/23  1639   PROCALCITONIN ng/ml 0.10 0.13       Lines/Drains:  Invasive Devices       Peripheral Intravenous Line  Duration             Peripheral IV 10/15/23 Right Forearm 2 days                          Imaging: No pertinent imaging reviewed.     Recent Cultures (last 7 days):         Last 24 Hours Medication List:   Current Facility-Administered Medications   Medication Dose Route Frequency Provider Last Rate    acetaminophen  650 mg Oral Q6H PRN Hola Milian MD      amLODIPine  10 mg Oral Daily Hola Milian MD      aspirin 81 mg Oral Daily Laura Sullivan MD      atorvastatin  20 mg Oral Daily With Dinner Laura Sullivan MD      brimonidine tartrate  1 drop Both Eyes TID Laura Sullivan MD      budesonide-formoterol  2 puff Inhalation BID Laura Sullivan MD      clonazePAM  0.5 mg Oral Daily PRN Laura Sullivan MD      cloNIDine  0.1 mg Oral BID Laura Sullivan MD      Diclofenac Sodium  2 g Topical 4x Daily PRN Ede Leal PA-C      docusate sodium  100 mg Oral BID Zina Gottron, CRNP      gabapentin  300 mg Oral BID Laura Sullivan MD      glycerin-hypromellose-  1 drop Both Eyes Q6H PRN Laura Sullivan MD      guaiFENesin  600 mg Oral Q12H [address] [facility]      heparin (porcine)  5,000 Units Subcutaneous Q8H [address]  Laura Sullivan MD      HYDROcodone Bit-Homatrop MBr  5 mL Oral Q4H PRN Ignacio Blount MD      insulin glargine  60 Units Subcutaneous HS Ignacio Blount MD      insulin lispro  1-6 Units Subcutaneous TID AC Laura Sullivan MD      insulin lispro  1-6 Units Subcutaneous HS Amparo Alejandre PA-C      insulin lispro  10 Units Subcutaneous TID With Meals Marcia Hubbard MD      ketotifen  1 drop Both Eyes BID Laura Sullivan MD      latanoprost  1 drop Both Eyes HS Laura Sullivan MD      levalbuterol  1.25 mg Nebulization Q8H PRN Zina Gottron, CRNP      ondansetron  4 mg Intravenous Q6H PRN Laura Sullivan MD      pantoprazole  40 mg Intravenous Q12H [address] [facility]      PARoxetine  30 mg Oral QAM Laura Sullivan MD      prednisoLONE acetate  1 drop Both Eyes HS Laura Sullivan MD      [START ON 10/18/2023] predniSONE  40 mg Oral Daily Marcia Hubbard MD      spironolactone  25 mg Oral Daily Laura Sullivan MD      traZODone  50 mg Oral HS Laura Sullivan MD          Today, Patient Was Seen By: Umang Mora MD    **Please Note: This note may have been constructed using a voice recognition system. **

## 2023-10-17 NOTE — PLAN OF CARE
Problem: RESPIRATORY - ADULT  Goal: Achieves optimal ventilation and oxygenation  Description: INTERVENTIONS:  - Assess for changes in respiratory status  - Assess for changes in mentation and behavior  - Position to facilitate oxygenation and minimize respiratory effort  - Oxygen administered by appropriate delivery if ordered  - Initiate smoking cessation education as indicated  - Encourage broncho-pulmonary hygiene including cough, deep breathe, Incentive Spirometry  - Assess the need for suctioning and aspirate as needed  - Assess and instruct to report SOB or any respiratory difficulty  - Respiratory Therapy support as indicated  Outcome: Progressing     Problem: METABOLIC, FLUID AND ELECTROLYTES - ADULT  Goal: Glucose maintained within target range  Description: INTERVENTIONS:  - Monitor Blood Glucose as ordered  - Assess for signs and symptoms of hyperglycemia and hypoglycemia  - Administer ordered medications to maintain glucose within target range  - Assess nutritional intake and initiate nutrition service referral as needed  Outcome: Progressing     Problem: Knowledge Deficit  Goal: Patient/family/caregiver demonstrates understanding of disease process, treatment plan, medications, and discharge instructions  Description: Complete learning assessment and assess knowledge base.   Interventions:  - Provide teaching at level of understanding  - Provide teaching via preferred learning methods  Outcome: Progressing     Problem: DISCHARGE PLANNING  Goal: Discharge to home or other facility with appropriate resources  Description: INTERVENTIONS:  - Identify barriers to discharge w/patient and caregiver  - Arrange for needed discharge resources and transportation as appropriate  - Identify discharge learning needs (meds, wound care, etc.)  - Arrange for interpretive services to assist at discharge as needed  - Refer to Case Management Department for coordinating discharge planning if the patient needs post-hospital services based on physician/advanced practitioner order or complex needs related to functional status, cognitive ability, or social support system  Outcome: Progressing

## 2023-10-17 NOTE — CASE MANAGEMENT
Case Management Discharge Planning Note    Patient name Enrique Shah  Location [address] [address] [place] 322/-35 MRN [id number]  : 1945 Date 10/17/2023       Current Admission Date: 10/11/2023  Current Admission Diagnosis:Shortness of breath   Patient Active Problem List    Diagnosis Date Noted    Shortness of breath 10/11/2023    Chronic respiratory failure with hypoxia ([address]) 10/11/2023    Dilatation of thoracic aorta ([address]) 06/15/2023    Malignant neoplasm of lower lobe of left lung ([address]) 03/15/2023    Recurrent major depressive disorder, in remission ([address]) 03/15/2023    Chronic obstructive pulmonary disease, unspecified COPD type ([address]) 2022    Cardiomyopathy, unspecified type ([address]) 2022    Bronchospasm 2021    Stage 4 chronic kidney disease ([address]) 2021    Obesity, morbid ([address]) 2021    Primary hyperaldosteronism ([address]) 2021    Hyperlipidemia     Prostate cancer ([address]) 2020    Depression 2020    Essential hypertension 2020    Low back pain 2020    Type 2 diabetes mellitus without complication, with long-term current use of insulin ([address]) 2020      LOS (days): 5  Geometric Mean LOS (GMLOS) (days): 2.10  Days to GMLOS:-2.9     OBJECTIVE:  Risk of Unplanned Readmission Score: 26.03         Current admission status: Inpatient   Preferred Pharmacy:   [facility], [address] [place] [address]  Phone: [phone] Fax: [phone]    Primary Care Provider: Omer Lr MD    Primary Insurance: Malini  Secondary Insurance: Kylah Mak [facility] REP    DISCHARGE DETAILS:     [facility] as [facility] informed they did not receive fax yesterday. [facility] called [facility] line to get message sent to pcp to confirm they received paperwork.

## 2023-10-17 NOTE — CASE MANAGEMENT
Case Management Discharge Planning Note    Patient name Amina Caceres  Location [address] [facility] [place] 322/-06 MRN [id number]  : 1945 Date 10/17/2023       Current Admission Date: 10/11/2023  Current Admission Diagnosis:Shortness of breath   Patient Active Problem List    Diagnosis Date Noted    Shortness of breath 10/11/2023    Chronic respiratory failure with hypoxia ([address]) 10/11/2023    Dilatation of thoracic aorta ([address]) 06/15/2023    Malignant neoplasm of lower lobe of left lung ([address]) 03/15/2023    Recurrent major depressive disorder, in remission ([address]) 03/15/2023    Chronic obstructive pulmonary disease, unspecified COPD type ([address]) 2022    Cardiomyopathy, unspecified type ([address]) 2022    Bronchospasm 2021    Stage 4 chronic kidney disease ([address]) 2021    Obesity, morbid ([address]) 2021    Primary hyperaldosteronism ([address]) 2021    Hyperlipidemia     Prostate cancer ([address]) 2020    Depression 2020    Essential hypertension 2020    Low back pain 2020    Type 2 diabetes mellitus without complication, with long-term current use of insulin ([address]) 2020      LOS (days): 5  Geometric Mean LOS (GMLOS) (days): 2.10  Days to GMLOS:-3.1     OBJECTIVE:  Risk of Unplanned Readmission Score: 26.03         Current admission status: Inpatient   Preferred Pharmacy:   [address] [place] [address], [address] [place] [address]  Phone: [phone] Fax: [phone]    Primary Care Provider: Sheliah Primrose, MD    Primary Insurance: Malini  Secondary Insurance: [facility] REP    DISCHARGE DETAILS:     CM contacted beatris from [facility] at the [place] and explained that vic is not getting a response from [place] PCP office. VIC faxed O2 information to Isela directly at [phone]. Isela stated she would also contact PCP office.

## 2023-10-17 NOTE — ASSESSMENT & PLAN NOTE
Requiring home oxygen  Currently buys oxygen out of pocket and shares with his wife   facilitating patient's own portable oxygen tank  [facility]

## 2023-10-17 NOTE — ASSESSMENT & PLAN NOTE
COPD exacerbation noted on 10/16  Started on steroids with improvement  Transitioned to PO prednisone  Discharge with prednisone once oxygen obtained

## 2023-10-17 NOTE — RESPIRATORY THERAPY NOTE
10/16/23 2840   Respiratory Assessment   Resp Comments pt placed on bipap for hours of sleep   O2 Device v60   Non-Invasive Information   O2 Interface Device Face mask   Non-Invasive Ventilation Mode BiPAP   $ Intermittent NIV Yes   SpO2 97 %   $ Pulse Oximetry Spot Check Charge Completed   Non-Invasive Settings   IPAP (cm) 12 cm   EPAP (cm) 6 cm   FiO2 (%) 32   Pressure Support (cm H2O) 6   Rise Time 2   Inspiratory Time (Set) 1   Humidification   (passover)   Non-Invasive Readings   Skin Intervention Skin intact   Total Rate 23   MV (Mech) 13.2   Peak Pressure (Obs) 13   Spontaneous Vt (mL) 525   Leak (lpm) 18   Non-Invasive Alarms   Insp Pressure High (cm H20) 25   Insp Pressure Low (cm H20) 5   Low Insp Pressure Time (sec) 20 sec   MV Low (L/min) 3   Vt High (mL) 1200   Vt Low (mL) 200   High Resp Rate (BPM) 40 BPM   Low Resp Rate (BPM) 8 BPM

## 2023-10-17 NOTE — ASSESSMENT & PLAN NOTE
Poorly controlled while admitted  Secondary to steroids for copd exacerbation  Adjust to PO prednisone  Increase lispro to 10 units from 5 units  Continue lantus 60 units qhs   Monitor

## 2023-10-17 NOTE — CASE MANAGEMENT
Case Management Discharge Planning Note    Patient name Enrique Shah  Location [address] [facility] 322/-52 MRN [id number]  : 1945 Date 10/17/2023       Current Admission Date: 10/11/2023  Current Admission Diagnosis:Shortness of breath   Patient Active Problem List    Diagnosis Date Noted    Shortness of breath 10/11/2023    Chronic respiratory failure with hypoxia ([address]) 10/11/2023    Dilatation of thoracic aorta ([address]) 06/15/2023    Malignant neoplasm of lower lobe of left lung ([address]) 03/15/2023    Recurrent major depressive disorder, in remission ([address]) 03/15/2023    Chronic obstructive pulmonary disease, unspecified COPD type ([address]) 2022    Cardiomyopathy, unspecified type ([address]) 2022    Bronchospasm 2021    Stage 4 chronic kidney disease ([address]) 2021    Obesity, morbid ([address]) 2021    Primary hyperaldosteronism ([address]) 2021    Hyperlipidemia     Prostate cancer ([address]) 2020    Depression 2020    Essential hypertension 2020    Low back pain 2020    Type 2 diabetes mellitus without complication, with long-term current use of insulin ([address]) 2020      LOS (days): 5  Geometric Mean LOS (GMLOS) (days): 2.10  Days to GMLOS:-3.1     OBJECTIVE:  Risk of Unplanned Readmission Score: 26.09         Current admission status: Inpatient   Preferred Pharmacy:   [address] [place] [address], [address] [place] [address]  Phone: [phone] Fax: [phone]    Primary Care Provider: Omer Lr MD    Primary Insurance: [facility]  Secondary Insurance: [facility] REP    DISCHARGE DETAILS:    CM spoke with Sherrill Main from the [place] and was informed that patient will have O2 tank to go home delivered tomorrow. CM spoke with patient and patient said O2 at home isnt portable. CM called Cecile Hernandez from [facility] and left voicemail explaining O2 at home isn't portable.  Patients O2 to go home with is still being delivered tomorrow.

## 2023-10-18 ENCOUNTER — TRANSITIONAL CARE MANAGEMENT (OUTPATIENT)
Dept: FAMILY MEDICINE CLINIC | Facility: CLINIC | Age: 78
End: 2023-10-18

## 2023-10-18 VITALS
HEART RATE: 78 BPM | SYSTOLIC BLOOD PRESSURE: 133 MMHG | HEIGHT: 67 IN | BODY MASS INDEX: 35.99 KG/M2 | RESPIRATION RATE: 16 BRPM | TEMPERATURE: 97.7 F | DIASTOLIC BLOOD PRESSURE: 69 MMHG | OXYGEN SATURATION: 89 % | WEIGHT: 229.28 LBS

## 2023-10-18 LAB
GLUCOSE SERPL-MCNC: 114 MG/DL (ref 65–140)
GLUCOSE SERPL-MCNC: 154 MG/DL (ref 65–140)

## 2023-10-18 PROCEDURE — C9113 INJ PANTOPRAZOLE SODIUM, VIA: HCPCS | Performed by: NURSE PRACTITIONER

## 2023-10-18 PROCEDURE — 99239 HOSP IP/OBS DSCHRG MGMT >30: CPT | Performed by: STUDENT IN AN ORGANIZED HEALTH CARE EDUCATION/TRAINING PROGRAM

## 2023-10-18 PROCEDURE — 94760 N-INVAS EAR/PLS OXIMETRY 1: CPT

## 2023-10-18 PROCEDURE — 82948 REAGENT STRIP/BLOOD GLUCOSE: CPT

## 2023-10-18 RX ORDER — PREDNISONE 20 MG/1
40 TABLET ORAL DAILY
Qty: 8 TABLET | Refills: 0 | Status: SHIPPED | OUTPATIENT
Start: 2023-10-19 | End: 2023-10-23

## 2023-10-18 RX ORDER — HYDROCODONE BITARTRATE AND HOMATROPINE METHYLBROMIDE ORAL SOLUTION 5; 1.5 MG/5ML; MG/5ML
5 LIQUID ORAL EVERY 4 HOURS PRN
Qty: 300 ML | Refills: 0 | Status: SHIPPED | OUTPATIENT
Start: 2023-10-18 | End: 2023-10-28

## 2023-10-18 RX ORDER — OMEPRAZOLE 40 MG/1
40 CAPSULE, DELAYED RELEASE ORAL DAILY
Qty: 14 CAPSULE | Refills: 0 | Status: SHIPPED | OUTPATIENT
Start: 2023-10-18 | End: 2023-11-01

## 2023-10-18 RX ADMIN — INSULIN LISPRO 10 UNITS: 100 INJECTION, SOLUTION INTRAVENOUS; SUBCUTANEOUS at 08:08

## 2023-10-18 RX ADMIN — ASPIRIN 81 MG: 81 TABLET, COATED ORAL at 09:07

## 2023-10-18 RX ADMIN — SPIRONOLACTONE 25 MG: 25 TABLET, COATED ORAL at 09:13

## 2023-10-18 RX ADMIN — GUAIFENESIN 600 MG: 600 TABLET ORAL at 09:10

## 2023-10-18 RX ADMIN — PAROXETINE HYDROCHLORIDE 30 MG: 20 TABLET, FILM COATED ORAL at 09:11

## 2023-10-18 RX ADMIN — DOCUSATE SODIUM 100 MG: 100 CAPSULE, LIQUID FILLED ORAL at 09:09

## 2023-10-18 RX ADMIN — KETOTIFEN FUMARATE 1 DROP: 0.25 SOLUTION/ DROPS OPHTHALMIC at 09:14

## 2023-10-18 RX ADMIN — BUDESONIDE AND FORMOTEROL FUMARATE DIHYDRATE 2 PUFF: 160; 4.5 AEROSOL RESPIRATORY (INHALATION) at 09:16

## 2023-10-18 RX ADMIN — AMLODIPINE BESYLATE 10 MG: 10 TABLET ORAL at 09:06

## 2023-10-18 RX ADMIN — CLONIDINE HYDROCHLORIDE 0.1 MG: 0.1 TABLET ORAL at 09:08

## 2023-10-18 RX ADMIN — GABAPENTIN 300 MG: 300 CAPSULE ORAL at 09:10

## 2023-10-18 RX ADMIN — HEPARIN SODIUM 5000 UNITS: 5000 INJECTION INTRAVENOUS; SUBCUTANEOUS at 05:42

## 2023-10-18 RX ADMIN — PREDNISONE 40 MG: 20 TABLET ORAL at 09:12

## 2023-10-18 RX ADMIN — PANTOPRAZOLE SODIUM 40 MG: 40 INJECTION, POWDER, FOR SOLUTION INTRAVENOUS at 09:16

## 2023-10-18 RX ADMIN — INSULIN LISPRO 1 UNITS: 100 INJECTION, SOLUTION INTRAVENOUS; SUBCUTANEOUS at 11:51

## 2023-10-18 RX ADMIN — INSULIN LISPRO 10 UNITS: 100 INJECTION, SOLUTION INTRAVENOUS; SUBCUTANEOUS at 11:47

## 2023-10-18 RX ADMIN — BRIMONIDINE TARTRATE 1 DROP: 2 SOLUTION/ DROPS OPHTHALMIC at 09:15

## 2023-10-18 NOTE — CASE MANAGEMENT
Case Management Discharge Planning Note    Patient name Elise Grace  Location [address] [facility] [place] 322/-15 MRN [id number]  : 1945 Date 10/18/2023       Current Admission Date: 10/11/2023  Current Admission Diagnosis:Shortness of breath   Patient Active Problem List    Diagnosis Date Noted    Shortness of breath 10/11/2023    Chronic respiratory failure with hypoxia ([address]) 10/11/2023    Dilatation of thoracic aorta ([address]) 06/15/2023    Malignant neoplasm of lower lobe of left lung ([address]) 03/15/2023    Recurrent major depressive disorder, in remission ([address]) 03/15/2023    Chronic obstructive pulmonary disease, unspecified COPD type ([address]) 2022    Cardiomyopathy, unspecified type ([address]) 2022    Bronchospasm 2021    Stage 4 chronic kidney disease ([address]) 2021    Obesity, morbid ([address]) 2021    Primary hyperaldosteronism ([address]) 2021    Hyperlipidemia     Prostate cancer ([address]) 2020    Depression 2020    Essential hypertension 2020    Low back pain 2020    Type 2 diabetes mellitus without complication, with long-term current use of insulin ([address]) 2020      LOS (days): 6  Geometric Mean LOS (GMLOS) (days): 2.10  Days to GMLOS:-3.8     OBJECTIVE:  Risk of Unplanned Readmission Score: 26.32         Current admission status: Inpatient   Preferred Pharmacy:   [address] [place] [address], [address] [place] [address]  Phone: [phone] Fax: [phone]    Primary Care Provider: Luciana Gary MD    Primary Insurance: Malini  Secondary Insurance: Nerissa Garrido [facility] REP    DISCHARGE DETAILS:    Discharge planning discussed with[de-identified] Patient  Freedom of Choice: Yes  Comments - Freedom of Choice: CM met with patient at bedside to review DC plan. Patient confirmed that the portable O2 tank was delivered this morning, and his wife is at home waiting for the home delivery.  Once it is delivered, she will come to the hospital to pick patient up and take him home. Patient confirmed that he has a walker already, so he does not need one at this time. CM also reported that [facility] has accepted for PT, OT, and skilled nursing, and they will contact him in a day or two to arrange services. CM contacted family/caregiver?: No- see comments (Patient declined.)  Were Treatment Team discharge recommendations reviewed with patient/caregiver?: Yes  Did patient/caregiver verbalize understanding of patient care needs?: Yes  Were patient/caregiver advised of the risks associated with not following Treatment Team discharge recommendations?: Yes    [address]         Is the patient interested in [facility] at discharge?: Yes  [facility] requested[de-identified] Physical Therapy, Occupational Therapy, [facility] Name[de-identified] [facility] External Referral Reason (only applicable if external HHA name selected): Services not provided in network or near patient location  [facility] Provider[de-identified] PCP (Dr. Bradley Beebe  through the [place])  [facility] Needed[de-identified] Diabetes Management, Evaluate Functional Status and Safety, Gait/ADL Training, COPD Management, Oxygen Via Nasal Cannula, Strengthening/Theraputic Exercises to Improve Function  Homebound Criteria Met[de-identified] Requires the Assistance of Another Person for Safe Ambulation or to Leave the Home, Uses an Assist Device (i.e. cane, walker, etc)  Supporting Clincal Findings[de-identified] Fatigues Easliy in [employer], Limited Endurance    Other Referral/Resources/Interventions Provided:  Interventions: Other (Specify) [facility])  Referral Comments: CM called the [address] at [phone] ext. [phone] and spoke to Peggy Sargent in the respiratory therapy department. She confirmed that the Home O2 was approved and is scheduled for delivery today, but CM will need to contact [address] [facility] directly to confirm this delivery.  CM called [address] at [phone] and spoke to [facility] who confirmed that the portable tank will be delivered to bedside today. The home O2 is also being delivered to the house today, as well. VIC then called the [address] [facility] at [phone], option 2 and spoke to [address] who reported that she will put in a consult to Dr. Levi Castro, Patient's PCP, and ask for home care orders. VIC called Dee Dee from the [address] [place] [address] Department at [phone] and updated her of the same. VIC reported that [facility] has accepted. Dee Dee confirmed that this will be approved once she receives the consult from patient's PCP. Would you like to participate in our [facility] service program?  : No - Declined    Treatment Team Recommendation: Home with [address] [facility]  Discharge Destination Plan[de-identified] Home with [address] [facility] at Discharge : Family     IMM Given (Date):: 10/18/23  IMM Given to[de-identified] Patient (CM reviewed IMM with patient at bedside. Patient reported understanding of these rights and is agreeable with DC determination. Patient signed and was given a copy.  Original copy placed in medical records.)

## 2023-10-18 NOTE — PLAN OF CARE
Problem: RESPIRATORY - ADULT  Goal: Achieves optimal ventilation and oxygenation  Description: INTERVENTIONS:  - Assess for changes in respiratory status  - Assess for changes in mentation and behavior  - Position to facilitate oxygenation and minimize respiratory effort  - Oxygen administered by appropriate delivery if ordered  - Initiate smoking cessation education as indicated  - Encourage broncho-pulmonary hygiene including cough, deep breathe, Incentive Spirometry  - Assess the need for suctioning and aspirate as needed  - Assess and instruct to report SOB or any respiratory difficulty  - Respiratory Therapy support as indicated  Outcome: Progressing     Problem: Knowledge Deficit  Goal: Patient/family/caregiver demonstrates understanding of disease process, treatment plan, medications, and discharge instructions  Description: Complete learning assessment and assess knowledge base.   Interventions:  - Provide teaching at level of understanding  - Provide teaching via preferred learning methods  Outcome: Progressing     Problem: DISCHARGE PLANNING  Goal: Discharge to home or other facility with appropriate resources  Description: INTERVENTIONS:  - Identify barriers to discharge w/patient and caregiver  - Arrange for needed discharge resources and transportation as appropriate  - Identify discharge learning needs (meds, wound care, etc.)  - Arrange for interpretive services to assist at discharge as needed  - Refer to Case Management Department for coordinating discharge planning if the patient needs post-hospital services based on physician/advanced practitioner order or complex needs related to functional status, cognitive ability, or social support system  Outcome: Progressing

## 2023-10-18 NOTE — RESPIRATORY THERAPY NOTE
RT Protocol Note  Burt Kellogg 70 y.o. male MRN: [id number]  Unit/Bed#: -01 Encounter: [id number]    Assessment    Principal Problem:    Shortness of breath  Active Problems:    Essential hypertension    Type 2 diabetes mellitus without complication, with long-term current use of insulin (HCC)    Primary hyperaldosteronism (HCC)    Stage 4 chronic kidney disease (HCC)    Chronic obstructive pulmonary disease, unspecified COPD type ([address])    Malignant neoplasm of lower lobe of left lung (HCC)    Chronic respiratory failure with hypoxia ([address])      Home Pulmonary Medications:    Home Devices/Therapy: BiPAP/CPAP    Past Medical History:   Diagnosis Date    Cancer ([address])     PROSTATE CANCER    Colon polyp     Hyperlipidemia     PTSD (post-traumatic stress disorder)     Type 2 diabetes mellitus without complication, with long-term current use of insulin ([address]) 5/29/2020    Type 2 diabetes mellitus without complication, with long-term current use of insulin ([address]) 5/29/2020          Objective    Physical Exam:   Assessment Type: Assess only  General Appearance: Drowsy  Respiratory Pattern: Normal  Chest Assessment: Chest expansion symmetrical  O2 Device: v60    Vitals:  Blood pressure 116/71, pulse 75, temperature 97.9 °F (36.6 °C), resp. rate 16, height 5' 7" (1.702 m), weight 106 kg (234 lb 9.1 oz), SpO2 96 %.         O2 Device: v60     Plan    Respiratory Plan: No distress/Pulmonary history        Resp Comments: Continue with MDI as ordered per home regimen

## 2023-10-18 NOTE — CASE MANAGEMENT
Case Management Discharge Planning Note    Patient name Jay Query  Location [address] [facility] 322/-79 MRN [id number]  : 1945 Date 10/18/2023       Current Admission Date: 10/11/2023  Current Admission Diagnosis:Shortness of breath   Patient Active Problem List    Diagnosis Date Noted    Shortness of breath 10/11/2023    Chronic respiratory failure with hypoxia ([address]) 10/11/2023    Dilatation of thoracic aorta ([address]) 06/15/2023    Malignant neoplasm of lower lobe of left lung ([address]) 03/15/2023    Recurrent major depressive disorder, in remission ([address]) 03/15/2023    Chronic obstructive pulmonary disease, unspecified COPD type ([address]) 2022    Cardiomyopathy, unspecified type ([address]) 2022    Bronchospasm 2021    Stage 4 chronic kidney disease ([address]) 2021    Obesity, morbid ([address]) 2021    Primary hyperaldosteronism ([address]) 2021    Hyperlipidemia     Prostate cancer ([address]) 2020    Depression 2020    Essential hypertension 2020    Low back pain 2020    Type 2 diabetes mellitus without complication, with long-term current use of insulin ([address]) 2020      LOS (days): 6  Geometric Mean LOS (GMLOS) (days): 2.10  Days to GMLOS:-3.8     OBJECTIVE:  Risk of Unplanned Readmission Score: 26.32         Current admission status: Inpatient   Preferred Pharmacy:   [address] [place] [address], [address] [place] [address]  Phone: [phone] Fax: [phone]    Primary Care Provider: Duong Juarez MD    Primary Insurance: Malini  Secondary Insurance: [facility]    DISCHARGE DETAILS:     CM spoke with nurse javier from patients pcp office at the [place]. CM informed javier that patient does not have portable O2 to leave home. Jaamon Chetan stated she would contact patient directly. CM asked javier about HHC order. Gonzalo Benton stated a specific department would put that in for patient and contact patient.  VIC informed javier that this has not been the process before as  has previously reserved [facility]  [address] [facility] for patients and then given [facility] [place] [address] [facility] agency information to [place] who then gave authorization to agency directly. [facility] Yao [facility].

## 2023-10-20 ENCOUNTER — TELEPHONE (OUTPATIENT)
Age: 78
End: 2023-10-20

## 2023-10-20 ENCOUNTER — OFFICE VISIT (OUTPATIENT)
Dept: FAMILY MEDICINE CLINIC | Facility: CLINIC | Age: 78
End: 2023-10-20
Payer: COMMERCIAL

## 2023-10-20 VITALS
DIASTOLIC BLOOD PRESSURE: 72 MMHG | BODY MASS INDEX: 36.41 KG/M2 | HEART RATE: 84 BPM | WEIGHT: 232 LBS | HEIGHT: 67 IN | SYSTOLIC BLOOD PRESSURE: 148 MMHG | TEMPERATURE: 98 F | OXYGEN SATURATION: 97 %

## 2023-10-20 DIAGNOSIS — I10 ESSENTIAL HYPERTENSION: ICD-10-CM

## 2023-10-20 DIAGNOSIS — D64.9 ANEMIA, UNSPECIFIED TYPE: ICD-10-CM

## 2023-10-20 DIAGNOSIS — N18.4 STAGE 4 CHRONIC KIDNEY DISEASE (HCC): ICD-10-CM

## 2023-10-20 DIAGNOSIS — M54.50 CHRONIC BILATERAL LOW BACK PAIN WITHOUT SCIATICA: ICD-10-CM

## 2023-10-20 DIAGNOSIS — G89.29 CHRONIC BILATERAL LOW BACK PAIN WITHOUT SCIATICA: ICD-10-CM

## 2023-10-20 DIAGNOSIS — J44.9 CHRONIC OBSTRUCTIVE PULMONARY DISEASE, UNSPECIFIED COPD TYPE (HCC): Primary | ICD-10-CM

## 2023-10-20 DIAGNOSIS — F33.40 RECURRENT MAJOR DEPRESSIVE DISORDER, IN REMISSION (HCC): ICD-10-CM

## 2023-10-20 PROCEDURE — 99496 TRANSJ CARE MGMT HIGH F2F 7D: CPT | Performed by: FAMILY MEDICINE

## 2023-10-20 RX ORDER — OXYCODONE HYDROCHLORIDE 10 MG/1
10 TABLET ORAL EVERY 6 HOURS PRN
Qty: 40 TABLET | Refills: 0 | Status: SHIPPED | OUTPATIENT
Start: 2023-10-20

## 2023-10-20 NOTE — PROGRESS NOTES
Name: Hermelindo Oneal      :       MRN: [id number]  Encounter Provider: Kesha Yang MD  Encounter Date: 10/20/2023   Encounter department: [address]     1. Chronic obstructive pulmonary disease, unspecified COPD type ([address] )  Assessment & Plan:  Continue with current inhalation therapy to maximize lung function. AVOID ALL TOBACCO USAGE and please obtain flu vaccine  yearly. 2. Recurrent major depressive disorder, in remission [facility])  Assessment & Plan:  Patient to continue utilizing medical therapy as well and counseling sources as applicable for condition. If  suicidal thought or fear of suicide to contact 911 and seek help immediately. Meds reviewed and patient questions answered today       3. Essential hypertension  Assessment & Plan:  Patient is stable with current anti-hypertensive medicine and continue to follow a low sodium diet and take current medication. All questions about this condition were answered today. 4. Stage 4 chronic kidney disease [facility])  Assessment & Plan:  Lab Results   Component Value Date    EGFR 20 10/17/2023    EGFR 21 10/16/2023    EGFR 22 10/15/2023    CREATININE 2.88 (H) 10/17/2023    CREATININE 2.70 (H) 10/16/2023    CREATININE 2.69 (H) 10/15/2023   Pt to avoid NSAIDs and any IV dyes. Patient to follow up eoither with nephrology or  with us for  further  monitoring of  renal function. 5. Anemia, unspecified type  -     CBC and differential; Future  -     Iron Panel (Includes Ferritin, Iron Sat%, Iron, and TIBC); Future    6. Chronic bilateral low back pain without sciatica  -     oxyCODONE (ROXICODONE) 10 MG TABS; Take 1 tablet (10 mg total) by mouth every 6 (six) hours as needed for moderate pain Max Daily Amount: 40 mg           Subjective     68-year-old male here for TCM visit from recent hospitalization for generalized heart failure.   Patient with a history of prostate cancer CHF hypertension hyperlipidemia type 2 diabetes and CKD stage IV. Patient had a good experience at the hospital he is breathing much better he is lost about 6 pounds since he was admitted and I discussed with patient the importance of really try to limit his salt intake to keep the water off. Discussed with patient things to avoid like canned goods lunch meats frozen dinners that are microwaved and using the saltshaker much less. Patient also is seeing [facility] for [facility] after coming home and I am sure they will also reinforce this with him also. Patient has not any refills on his medicines gets most of his medicines to the [place]. If he has trouble getting those medicines we will gladly refill them for him      Review of Systems   Constitutional:  Negative for activity change, appetite change, chills, fatigue, fever and unexpected weight change. HENT:  Negative for congestion, ear pain, hearing loss, mouth sores, postnasal drip, sinus pressure, sinus pain, sneezing and sore throat. Respiratory:  Negative for apnea, cough, shortness of breath and wheezing. Cardiovascular:  Negative for chest pain, palpitations and leg swelling. Gastrointestinal:  Negative for abdominal pain, constipation, diarrhea, nausea and vomiting. Endocrine: Negative for cold intolerance and heat intolerance. Genitourinary:  Negative for dysuria, frequency and hematuria. Musculoskeletal:  Negative for arthralgias, back pain, gait problem, joint swelling and neck pain. Skin:  Negative for rash. Neurological:  Negative for dizziness, weakness and numbness. Hematological:  Does not bruise/bleed easily. Psychiatric/Behavioral:  Negative for agitation, behavioral problems, confusion, hallucinations and sleep disturbance. The patient is not nervous/anxious.         Past Medical History:   Diagnosis Date   • Cancer [facility])     PROSTATE CANCER   • Colon polyp    • Hyperlipidemia    • PTSD (post-traumatic stress disorder)    • Type 2 diabetes mellitus without complication, with long-term current use of insulin ([address]) 2020   • Type 2 diabetes mellitus without complication, with long-term current use of insulin ([address]) 2020     Past Surgical History:   Procedure Laterality Date   • APPENDECTOMY       Family History   Problem Relation Age of Onset   • Diabetes Mother    • Hypertension Mother    • Multiple sclerosis Sister    • Parkinsonism Sister      Social History     Socioeconomic History   • Marital status: /Civil Union     Spouse name: None   • Number of children: None   • Years of education: None   • Highest education level: None   Occupational History   • None   Tobacco Use   • Smoking status: Never   • Smokeless tobacco: Never   Vaping Use   • Vaping Use: Never used   Substance and Sexual Activity   • Alcohol use: Not Currently   • Drug use: Never   • Sexual activity: None   Other Topics Concern   • None   Social History Narrative    Most recent tobacco use screenin-      Do you currently or have you served in the [facility] [address]:   Yes      If Yes, What branch of service:   [address] [place] [address]      Were you activated, into active duty, as a member of the [employer] or as a Reservist:   No      Marital status:         Exercise level:    Moderate      Diet:   Regular      General stress level:   High      Alcohol intake:   None      Caffeine intake:   None      Seat belts used routinely:   Yes      Sunscreen used routinely:   Yes      Smoke alarm in home:   Yes      Advance directive:   Yes      Social Determinants of Health     Financial Resource Strain: High Risk (9/15/2023)    Overall Financial Resource Strain (CARDIA)    • Difficulty of Paying Living Expenses: Hard   Food Insecurity: No Food Insecurity (10/13/2023)    Hunger Vital Sign    • Worried About Running Out of Food in the Last Year: Never true    • Ran Out of Food in the Last Year: Never true   Transportation Needs: No Transportation Needs (10/13/2023)    PRAPARE - Transportation    • Lack of Transportation (Medical): No    • Lack of Transportation (Non-Medical): No   Physical Activity: Not on file   Stress: Not on file   Social Connections: Not on file   Intimate Partner Violence: Not on file   Housing Stability: Low Risk  (10/13/2023)    Housing Stability Vital Sign    • Unable to Pay for Housing in the Last Year: No    • Number of Places Lived in the Last Year: 1    • Unstable Housing in the Last Year: No     Current Outpatient Medications on File Prior to Visit   Medication Sig   • amLODIPine (NORVASC) 10 mg tablet TAKE 1 TABLET BY MOUTH EVERY DAY   • Artificial Tear Solution (Just Tears Eye Drops) SOLN INSTILL 1 DROP BOTH EYES TWICE A DAY OR AS NEEDED FOR DRY EYE   • aspirin (ECOTRIN LOW STRENGTH) 81 mg EC tablet Take 81 mg by mouth daily   • atorvastatin (LIPITOR) 40 mg tablet TAKE 20MG (ONE-HALF TABLET) BY MOUTH EVERY DAY AT 5 PM FOR CHOLESTEROL   • carboxymethylcellulose (REFRESH PLUS) 0.5 % SOLN INSTILL 1 DROP BOTH EYES THREE TIMES A DAY AS NEEDED FOR DRY EYES   • cholecalciferol (VITAMIN D3) 400 units tablet Take 400 Units by mouth daily   • clonazePAM (KlonoPIN) 0.5 MG disintegrating tablet Take 1 tablet (0.5 mg total) by mouth 2 (two) times a day   • cloNIDine (CATAPRES) 0.1 mg tablet TAKE 1 TABLET BY MOUTH TWICE A DAY   • Continuous Blood Gluc Sensor (FreeStyle Hugo 2 Sensor) MISC Check blood sugars multiple times per day   • Diclofenac Sodium (VOLTAREN) 1 % APPLY 2GM TO UPPER EXTREMITIES TOPICALLY TWICE A DAY **USE DOSING CARD** (DO NOT EXCEED 16 GRAMS DAILY TO ANY AFFECTED JOINT OF THE LOWER EXTREMITIES. DO NOT EXCEED 8 GRAMS DAILY TO ANY AFFECTED JOINT OF THE UPPER EXTREMITIES.  DO NOT EXCEED A TOTAL DOSE OF 32 GRAMS DAILY OVER ALL JOINTS)   • gabapentin (Neurontin) 300 mg capsule Take 1 capsule (300 mg total) by mouth 3 (three) times a day (Patient taking differently: Take 300 mg by mouth 2 (two) times a day)   • glucose blood test strip 1 each by Other route daily as needed Use as instructed   • HYDROcodone Bit-Homatrop MBr (HYCODAN) 5-1.5 mg/5 mL syrup Take 5 mL by mouth every 4 (four) hours as needed for cough for up to 10 days Max Daily Amount: 30 mL   • insulin glargine (LANTUS) 100 units/mL subcutaneous injection Inject 40 Units under the skin daily at bedtime   • ipratropium-albuterol (DUO-NEB) 0.5-2.5 mg/3 mL nebulizer solution INHALE 1 VIAL IN NEBULIZER TWICE A DAY   • ketotifen (ZADITOR) 0.025 % ophthalmic solution INSTILL 1 DROP BOTH EYES TWICE A DAY FOR ALLERGY   • latanoprost (XALATAN) 0.005 % ophthalmic solution 1 drop daily at bedtime   • lidocaine (LIDODERM) 5 % APPLY UP TO 3 PATCHES TOPICALLY EVERY DAY FOR PAIN (REMOVE PATCH AFTER 12 HOURS; 12 HOURS ON AND 12 HOURS OFF)   • multivitamin (THERAGRAN) TABS Take 1 tablet by mouth daily   • omeprazole (PriLOSEC) 40 MG capsule Take 1 capsule (40 mg total) by mouth daily for 14 days   • PARoxetine (PAXIL) 30 mg tablet TAKE 1 TABLET BY MOUTH EVERY MORNING   • predniSONE 20 mg tablet Take 2 tablets (40 mg total) by mouth daily for 4 days Do not start before October 19, 2023.    • spironolactone (ALDACTONE) 25 mg tablet Take 1 tablet (25 mg total) by mouth daily   • Symbicort 160-4.5 MCG/ACT inhaler INHALE 2 PUFFS TWICE A DAY   • traZODone (DESYREL) 50 mg tablet TAKE 1 TABLET BY MOUTH EVERYDAY AT BEDTIME   • [DISCONTINUED] oxyCODONE (ROXICODONE) 10 MG TABS Take 1 tablet (10 mg total) by mouth every 6 (six) hours as needed for moderate pain Max Daily Amount: 40 mg     Allergies   Allergen Reactions   • Lisinopril Anaphylaxis   • Metoprolol Bradycardia     Immunization History   Administered Date(s) Administered   • COVID-19 MODERNA VACC 0.5 ML IM 07/02/2021, 07/30/2021, 01/25/2022   • COVID-19, unspecified 04/28/2021   • DT (pediatric) 05/12/1998   • INFLUENZA 11/06/2020, 11/01/2021, 10/15/2022   • Influenza, high dose seasonal 0.7 mL 11/16/2020, 11/12/2021, 10/24/2022, 09/15/2023   • Pneumococcal 06/23/2009   • Pneumococcal Conjugate 13-Valent 03/19/2018   • Pneumococcal Polysaccharide PPV23 11/02/2018   • Tdap 11/02/2018   • Tetanus Toxoid, Unspecified 09/01/2007     TCM Call     Date and time call was made  10/18/2023  [facility] reviewed  Records reviewed    Patient was hospitialized at  [id number]    Date of Admission  10/11/23    Date of discharge  10/18/23    Diagnosis  shortness of breath    Disposition  Home    Were the patients medications reviewed and updated  Yes    Current Symptoms  None      TCM Call     Post hospital issues  None    Should patient be enrolled in anticoag monitoring? No    Scheduled for follow up? No    Did you obtain your prescribed medications  Yes    Do you need help managing your prescriptions or medications  No    Is transportation to your appointment needed  No    I have advised the patient to call PCP with any new or worsening symptoms  Jamey Agiular CMA    Living Arrangements  Spouse or Significiant other    Support System  Spouse    The type of support provided  Emotional    Do you have social support  Yes, as much as I need    Are you recieving any outpatient services  No    Are you recieving home care services  Yes    Types of home care services  Nurse visit    Are you using any community resources  No    Current waiver services  No    Have you fallen in the last 12 months  No    Interperter language line needed  No    Counseling  Patient    Counseling topics  Prognosis         Objective     /72 (BP Location: Left arm, Patient Position: Sitting, Cuff Size: Large)   Pulse 84   Temp 98 °F (36.7 °C) (Skin)   Ht 5' 7" (1.702 m)   Wt 105 kg (232 lb)   SpO2 97%   BMI 36.34 kg/m²     Physical Exam  Vitals and nursing note reviewed. Constitutional:       Appearance: He is well-developed. HENT:      Head: Normocephalic and atraumatic. Nose: Nose normal.   Eyes:      General: No scleral icterus.      Conjunctiva/sclera: Conjunctivae normal. Pupils: Pupils are equal, round, and reactive to light. Neck:      Thyroid: No thyromegaly. Cardiovascular:      Rate and Rhythm: Normal rate and regular rhythm. Heart sounds: Normal heart sounds. Pulmonary:      Effort: Pulmonary effort is normal. No respiratory distress. Breath sounds: Normal breath sounds. No wheezing. Abdominal:      General: Bowel sounds are normal.      Palpations: Abdomen is soft. Tenderness: There is no abdominal tenderness. There is no guarding or rebound. Musculoskeletal:         General: Normal range of motion. Cervical back: Normal range of motion and neck supple. Skin:     General: Skin is warm and dry. Findings: No rash. Neurological:      Mental Status: He is alert and oriented to person, place, and time. Psychiatric:         Mood and Affect: Mood normal.         Behavior: Behavior normal.         Thought Content:  Thought content normal.         Judgment: Judgment normal.       Sheliah Primrose, MD

## 2023-10-20 NOTE — TELEPHONE ENCOUNTER
The patient's called to report that he is was stock in traffic he is on his way to the practice we try to connect the patient but the call was not connected.

## 2023-10-20 NOTE — ASSESSMENT & PLAN NOTE
Patient is stable with current meds and discussed a low carb diet. Pt  recommended to see eye doctor and foot doctor for routine screening as per protocol. Recheck A1C  and Cr in 3 months. Patient questions answered today about this condtion.    Lab Results   Component Value Date    HGBA1C 6.5 09/15/2023

## 2023-10-20 NOTE — ASSESSMENT & PLAN NOTE
Lab Results   Component Value Date    EGFR 20 10/17/2023    EGFR 21 10/16/2023    EGFR 22 10/15/2023    CREATININE 2.88 (H) 10/17/2023    CREATININE 2.70 (H) 10/16/2023    CREATININE 2.69 (H) 10/15/2023   Pt to avoid NSAIDs and any IV dyes. Patient to follow up eoither with nephrology or  with us for  further  monitoring of  renal function.

## 2023-10-24 NOTE — ASSESSMENT & PLAN NOTE
COPD exacerbation noted on 10/16  Started on steroids with improvement  Transitioned to PO prednisone  Discharge with prednisone

## 2023-10-24 NOTE — ASSESSMENT & PLAN NOTE
Requiring home oxygen  Currently buys oxygen out of pocket and shares with his wife   facilitating patient's own portable oxygen tank  Pending [facility] clearance/approval, now approved

## 2023-10-24 NOTE — ASSESSMENT & PLAN NOTE
Likely in setting of COPD exacerbation  Improved with steroids  Pending home oxygen facilitation by  since Patient reports using his wife's oxygen tank  Pending authorization by [facility], which is now completed  Stable for discharge home with portable oxygen

## 2023-10-24 NOTE — DISCHARGE SUMMARY
[address]  Discharge- Corinn Moritz 1945, 70 y.o. male MRN: [id number]  Unit/Bed#: -01 Encounter: [id number]  Primary Care Provider: Griffin Orona MD   Date and time admitted to hospital: 10/11/2023  4:07 PM    * Shortness of breath  Assessment & Plan  Likely in setting of COPD exacerbation  Improved with steroids  Pending home oxygen facilitation by  since Patient reports using his wife's oxygen tank  Pending authorization by [facility], which is now completed  Stable for discharge home with portable oxygen    Chronic respiratory failure with hypoxia ([address])  Assessment & Plan  Requiring home oxygen  Currently buys oxygen out of pocket and shares with his wife   facilitating patient's own portable oxygen tank  Pending [facility] clearance/approval, now approved    Malignant neoplasm of lower lobe of left lung [facility])  Assessment & Plan  Outpatient follow-up    Chronic obstructive pulmonary disease, unspecified COPD type ([address])  Assessment & Plan  COPD exacerbation noted on 10/16  Started on steroids with improvement  Transitioned to PO prednisone  Discharge with prednisone     Primary hyperaldosteronism [facility])  Assessment & Plan  Continue Aldactone    Essential hypertension  Assessment & Plan  Continue amlodipine, Aldactone        Medical Problems       Resolved Problems  Date Reviewed: 10/20/2023   None       Discharging Physician / Practitioner: Marilee Serna MD  PCP: Griffin Orona MD  Admission Date:   Admission Orders (From admission, onward)       Ordered        10/12/23 1130  Inpatient Admission  Once            10/11/23 1911  Place in Observation  Once                          Discharge Date: 10/18/23    Consultations During Hospital Stay:  None      Procedures Performed:   imaging    Significant Findings / Test Results:   Principal Problem:    Shortness of breath  Active Problems:    Essential hypertension    Type 2 diabetes mellitus without complication, with long-term current use of insulin (HCC)    Primary hyperaldosteronism (HCC)    Stage 4 chronic kidney disease (HCC)    Chronic obstructive pulmonary disease, unspecified COPD type ([address])    Malignant neoplasm of lower lobe of left lung (HCC)    Chronic respiratory failure with hypoxia (HCC)  Resolved Problems:    * No resolved hospital problems. *        Incidental Findings:   See above    Test Results Pending at Discharge (will require follow up):   na     Outpatient Tests Requested: Follow up with PCP     Complications:  na     Reason for Admission: shortness of breath     Hospital Course:   Roya Gaona is a 70 y.o. male patient who originally presented to the hospital on 10/11/2023 due to shortness of breath secondary to COPD exacerbation which improved with IV steroids. Of note, patient reported sharing oxygen with his wife, so  facilitated patient's own separate portable oxygen with the [facility]. He was stable for discharge at his baseline oxygen requirements with PO steroids. Condition at Discharge: good    Discharge Day Visit / Exam:   * Please refer to separate progress note for these details *    Discussion with Family: Updated  (sister) via phone. Discharge instructions/Information to patient and family:   See after visit summary for information provided to patient and family. Provisions for Follow-Up Care:  See after visit summary for information related to follow-up care and any pertinent home health orders. Disposition:   Home    Planned Readmission: none     Discharge Statement:  I spent 30+ minutes discharging the patient. This time was spent on the day of discharge. I had direct contact with the patient on the day of discharge. Greater than 50% of the total time was spent examining patient, answering all patient questions, arranging and discussing plan of care with patient as well as directly providing post-discharge instructions.   Additional time then spent on discharge activities. Discharge Medications:  See after visit summary for reconciled discharge medications provided to patient and/or family.       **Please Note: This note may have been constructed using a voice recognition system**

## 2023-10-31 ENCOUNTER — TELEPHONE (OUTPATIENT)
Age: 78
End: 2023-10-31

## 2023-10-31 ENCOUNTER — TELEPHONE (OUTPATIENT)
Dept: FAMILY MEDICINE CLINIC | Facility: CLINIC | Age: 78
End: 2023-10-31

## 2023-10-31 NOTE — TELEPHONE ENCOUNTER
Received call from [facility] to confirm if patient has a current diagnosis for prostate cancer and anxiety. Please follow up with Holly Guzman.

## 2023-11-03 ENCOUNTER — TELEPHONE (OUTPATIENT)
Age: 78
End: 2023-11-03

## 2023-11-03 NOTE — TELEPHONE ENCOUNTER
Phone call to patient and reported that we did not receive anything from this company. Patient stated he would follow up with them and call us back.

## 2023-11-03 NOTE — TELEPHONE ENCOUNTER
Patient would like a call back from the doctor to let him know that [address] is going to send him documentation for his script for Free Style sensor.  They will be reaching out by phone

## 2023-11-06 ENCOUNTER — TELEPHONE (OUTPATIENT)
Age: 78
End: 2023-11-06

## 2023-11-06 DIAGNOSIS — E11.40 TYPE 2 DIABETES MELLITUS WITH DIABETIC NEUROPATHY, WITHOUT LONG-TERM CURRENT USE OF INSULIN (HCC): Primary | ICD-10-CM

## 2023-11-06 NOTE — TELEPHONE ENCOUNTER
Patient asking if we received fax from [facility]. I gave patient number to call them and have them fax again to office.

## 2023-11-06 NOTE — TELEPHONE ENCOUNTER
Patient aware that we received form and faxed back. Another message is open. Ok to close this message.

## 2023-11-06 NOTE — TELEPHONE ENCOUNTER
Rep from agency, Jacinta Cowan,  is requesting on behalf of PT a script, along with chart note and most recent A1C for [facility] Device and sensors. Also she will be faxing over a form that may be filled out instead. The fax number is [phone].     Thank You

## 2023-11-13 ENCOUNTER — APPOINTMENT (OUTPATIENT)
Age: 78
End: 2023-11-13
Payer: COMMERCIAL

## 2023-11-13 DIAGNOSIS — E11.40 TYPE 2 DIABETES MELLITUS WITH DIABETIC NEUROPATHY, WITHOUT LONG-TERM CURRENT USE OF INSULIN (HCC): ICD-10-CM

## 2023-11-13 DIAGNOSIS — Z79.4 TYPE 2 DIABETES MELLITUS WITHOUT COMPLICATION, WITH LONG-TERM CURRENT USE OF INSULIN (HCC): ICD-10-CM

## 2023-11-13 DIAGNOSIS — D64.9 ANEMIA, UNSPECIFIED TYPE: ICD-10-CM

## 2023-11-13 DIAGNOSIS — E11.9 TYPE 2 DIABETES MELLITUS WITHOUT COMPLICATION, WITH LONG-TERM CURRENT USE OF INSULIN (HCC): ICD-10-CM

## 2023-11-13 LAB
ALBUMIN SERPL BCP-MCNC: 3.8 G/DL (ref 3.5–5)
ALP SERPL-CCNC: 66 U/L (ref 34–104)
ALT SERPL W P-5'-P-CCNC: 14 U/L (ref 7–52)
ANION GAP SERPL CALCULATED.3IONS-SCNC: 10 MMOL/L
AST SERPL W P-5'-P-CCNC: 14 U/L (ref 13–39)
BASOPHILS # BLD AUTO: 0.04 THOUSANDS/ÂΜL (ref 0–0.1)
BASOPHILS NFR BLD AUTO: 1 % (ref 0–1)
BILIRUB SERPL-MCNC: 0.66 MG/DL (ref 0.2–1)
BUN SERPL-MCNC: 56 MG/DL (ref 5–25)
CALCIUM SERPL-MCNC: 10.3 MG/DL (ref 8.4–10.2)
CHLORIDE SERPL-SCNC: 104 MMOL/L (ref 96–108)
CO2 SERPL-SCNC: 24 MMOL/L (ref 21–32)
CREAT SERPL-MCNC: 3.58 MG/DL (ref 0.6–1.3)
EOSINOPHIL # BLD AUTO: 0.15 THOUSAND/ÂΜL (ref 0–0.61)
EOSINOPHIL NFR BLD AUTO: 3 % (ref 0–6)
ERYTHROCYTE [DISTWIDTH] IN BLOOD BY AUTOMATED COUNT: 15.2 % (ref 11.6–15.1)
FERRITIN SERPL-MCNC: 436 NG/ML (ref 24–336)
GFR SERPL CREATININE-BSD FRML MDRD: 15 ML/MIN/1.73SQ M
GLUCOSE P FAST SERPL-MCNC: 96 MG/DL (ref 65–99)
HCT VFR BLD AUTO: 28.8 % (ref 36.5–49.3)
HGB BLD-MCNC: 9.2 G/DL (ref 12–17)
IMM GRANULOCYTES # BLD AUTO: 0.1 THOUSAND/UL (ref 0–0.2)
IMM GRANULOCYTES NFR BLD AUTO: 2 % (ref 0–2)
IRON SATN MFR SERPL: 15 % (ref 15–50)
IRON SERPL-MCNC: 35 UG/DL (ref 50–212)
LYMPHOCYTES # BLD AUTO: 0.73 THOUSANDS/ÂΜL (ref 0.6–4.47)
LYMPHOCYTES NFR BLD AUTO: 15 % (ref 14–44)
MCH RBC QN AUTO: 27.7 PG (ref 26.8–34.3)
MCHC RBC AUTO-ENTMCNC: 31.9 G/DL (ref 31.4–37.4)
MCV RBC AUTO: 87 FL (ref 82–98)
MONOCYTES # BLD AUTO: 0.56 THOUSAND/ÂΜL (ref 0.17–1.22)
MONOCYTES NFR BLD AUTO: 12 % (ref 4–12)
NEUTROPHILS # BLD AUTO: 3.25 THOUSANDS/ÂΜL (ref 1.85–7.62)
NEUTS SEG NFR BLD AUTO: 67 % (ref 43–75)
NRBC BLD AUTO-RTO: 1 /100 WBCS
PLATELET # BLD AUTO: 290 THOUSANDS/UL (ref 149–390)
PMV BLD AUTO: 11.5 FL (ref 8.9–12.7)
POTASSIUM SERPL-SCNC: 5.2 MMOL/L (ref 3.5–5.3)
PROT SERPL-MCNC: 7.1 G/DL (ref 6.4–8.4)
RBC # BLD AUTO: 3.32 MILLION/UL (ref 3.88–5.62)
SODIUM SERPL-SCNC: 138 MMOL/L (ref 135–147)
TIBC SERPL-MCNC: 241 UG/DL (ref 250–450)
UIBC SERPL-MCNC: 206 UG/DL (ref 155–355)
WBC # BLD AUTO: 4.83 THOUSAND/UL (ref 4.31–10.16)

## 2023-11-13 PROCEDURE — 83550 IRON BINDING TEST: CPT

## 2023-11-13 PROCEDURE — 83036 HEMOGLOBIN GLYCOSYLATED A1C: CPT

## 2023-11-13 PROCEDURE — 82728 ASSAY OF FERRITIN: CPT

## 2023-11-13 PROCEDURE — 85025 COMPLETE CBC W/AUTO DIFF WBC: CPT

## 2023-11-13 PROCEDURE — 80053 COMPREHEN METABOLIC PANEL: CPT

## 2023-11-13 PROCEDURE — 83540 ASSAY OF IRON: CPT

## 2023-11-13 PROCEDURE — 36415 COLL VENOUS BLD VENIPUNCTURE: CPT

## 2023-11-14 LAB
EST. AVERAGE GLUCOSE BLD GHB EST-MCNC: 180 MG/DL
HBA1C MFR BLD: 7.9 %

## 2023-11-24 DIAGNOSIS — F41.9 ANXIETY: ICD-10-CM

## 2023-11-24 RX ORDER — CLONAZEPAM 0.5 MG/1
0.5 TABLET, ORALLY DISINTEGRATING ORAL 2 TIMES DAILY
Qty: 30 TABLET | Refills: 0 | Status: SHIPPED | OUTPATIENT
Start: 2023-11-24

## 2023-11-24 NOTE — TELEPHONE ENCOUNTER
Pt called in stating that he ran out of his medication today and has not taken any medication today for this prescription. Pt is asking if this can be submitted as a urgent request bc he doesn't have any more tablets. Informed the pt we are just waiting on an approval. Pt stated he would like a call back whenever the prescription is called in thank you.

## 2023-11-24 NOTE — TELEPHONE ENCOUNTER
Reason for call:   [x] Refill   [] Prior Auth  [] Other:     Office:   [x] PCP/Provider - Marvk   [] Specialty/Provider -     Medication: Clonazepam     Dose/Frequency: 0.5mg BID     Quantity: 30    Pharmacy: [id number]    Does the patient have enough for 3 days? [] Yes   [x] No - Send as HP to POD    Patient is completely out and missed today's dose.

## 2023-12-15 ENCOUNTER — TELEPHONE (OUTPATIENT)
Age: 78
End: 2023-12-15

## 2023-12-15 NOTE — TELEPHONE ENCOUNTER
Jane, from [facility], called to follow up on Home Health Order# [id number] faxed to the office on 11/20 and 11/28.  She will fax it again to [phone].

## 2023-12-17 NOTE — ASSESSMENT & PLAN NOTE
Patient is stable  and will continue present plan of care and reassess at next routine visit. All questions about this problem from patient were answered today.

## 2023-12-17 NOTE — PROGRESS NOTES
Name: Adam Stone      : 1945      MRN: [id number]  Encounter Provider: Marco Antonio Grove MD  Encounter Date: 2023   Encounter department: [facility]    Assessment & Plan     1. Type 2 diabetes mellitus with diabetic neuropathy, without long-term current use of insulin (HCC)  -     IRIS Diabetic eye exam    2. Type 2 diabetes mellitus without complication, with long-term current use of insulin (HCC)  Assessment & Plan:  Patient is stable with current meds and discussed a low carb diet. Pt  recommended to see eye doctor and foot doctor for routine screening as per protocol. Recheck A1C  and Cr in 3 months. Patient questions answered today about this condtion.   Lab Results   Component Value Date    HGBA1C 7.9 (H) 2023       3. Stage 4 chronic kidney disease (HCC)  Assessment & Plan:  Lab Results   Component Value Date    EGFR 15 2023    EGFR 20 10/17/2023    EGFR 21 10/16/2023    CREATININE 3.58 (H) 2023    CREATININE 2.88 (H) 10/17/2023    CREATININE 2.70 (H) 10/16/2023   Pt to avoid NSAIDs and any IV dyes. Patient to follow up eoither with nephrology or  with us for  further  monitoring of  renal function.       4. Recurrent major depressive disorder, in remission (HCC)  Assessment & Plan:  Patient to continue utilizing medical therapy as well and counseling sources as applicable for condition. If  suicidal thought or fear of suicide to contact 911 and seek help immediately. Meds reviewed and patient questions answered today       5. Prostate cancer (HCC)  Assessment & Plan:  Patient is stable  and will continue present plan of care and reassess at next routine visit. All questions about this problem from patient were answered today.       6. Obesity, morbid (HCC)  Assessment & Plan:  Patient to increase exercise and partake of a diet with less calories to promote  weight loss       7. Mixed hyperlipidemia  Assessment & Plan:  Patient  is stable with  current medication and we discussed a low fat low cholesterol diet. Weight loss also discussed for this will help lower cholesterol also. Recheck lipids in 6 months.       8. Essential hypertension  Assessment & Plan:  Patient is stable with current anti-hypertensive medicine and continue to follow a low sodium diet and take current medication. All questions about this condition were answered today.       9. Chronic obstructive pulmonary disease, unspecified COPD type (HCC)  Assessment & Plan:  Continue with current inhalation therapy to maximize lung function. AVOID ALL TOBACCO USAGE and please obtain flu vaccine  yearly.           Depression Screening and Follow-up Plan: Patient assessed for underlying major depression. Brief counseling provided and recommend additional follow-up/re-evaluation next office visit. Patient advised to follow-up with PCP for further management.     Falls Plan of Care: balance, strength, and gait training instructions were provided. Home safety education provided.       Subjective     Is a 78-year-old male here today for checkup for multimedical problems patient with history of prostate cancer as well as lung cancer hypertension diabetes history of posttraumatic stress disorder and hyperlipidemia.  Patient also with COPD and has been stable.  Patient had lab work done recently his A1c was little high at 7.9 but he [facility]  [facility] and I will refill his clonazepam today.  Patient also is having issues with ambulating due to arthritis.  Patient was inquiring about getting a motorized scooter for his facility he does have the stairway and the ramp is already in place for the scooter he is using is defunct and is really not you cable being used due to its being small size and broken most of the time.  Will see about getting a replacement scooter for his facility.  This patient is capable of using a scooter he is strong  enough and has the mental capacity to use something like this he uses within his home to maintain his ADLs.  Patient uses to help him with his toileting as well as with eating and consuming food.  Patient will need help to see about getting his motorized scooter and I will consult the  to see where he can get this going so we can get one of the scooters.      Review of Systems   Constitutional:  Negative for activity change, appetite change, chills, fatigue, fever and unexpected weight change.   HENT:  Negative for congestion, ear pain, hearing loss, mouth sores, postnasal drip, sinus pressure, sinus pain, sneezing and sore throat.    Respiratory:  Negative for apnea, cough, shortness of breath and wheezing.    Cardiovascular:  Negative for chest pain, palpitations and leg swelling.   Gastrointestinal:  Negative for abdominal pain, constipation, diarrhea, nausea and vomiting.   Endocrine: Negative for cold intolerance and heat intolerance.   Genitourinary:  Negative for dysuria, frequency and hematuria.   Musculoskeletal:  Negative for arthralgias, back pain, gait problem, joint swelling and neck pain.   Skin:  Negative for rash.   Neurological:  Negative for dizziness, weakness and numbness.   Hematological:  Does not bruise/bleed easily.   Psychiatric/Behavioral:  Negative for agitation, behavioral problems, confusion, hallucinations and sleep disturbance. The patient is not nervous/anxious.        Past Medical History:   Diagnosis Date    Cancer (HCC)     PROSTATE CANCER    Colon polyp     Hyperlipidemia     PTSD (post-traumatic stress disorder)     Type 2 diabetes mellitus without complication, with long-term current use of insulin ([facility]) 5/29/2020    Type 2 diabetes mellitus without complication, with long-term current use of insulin ([facility]) 5/29/2020     Past Surgical History:   Procedure Laterality Date    APPENDECTOMY       Family History   Problem Relation Age of Onset    Diabetes Mother      Hypertension Mother     Multiple sclerosis Sister     Parkinsonism Sister      Social History     Socioeconomic History    Marital status: /Civil Union     Spouse name: None    Number of children: None    Years of education: None    Highest education level: None   Occupational History    None   Tobacco Use    Smoking status: Never    Smokeless tobacco: Never   Vaping Use    Vaping status: Never Used   Substance and Sexual Activity    Alcohol use: Not Currently    Drug use: Never    Sexual activity: None   Other Topics Concern    None   Social History Narrative    Most recent tobacco use screenin-      Do you currently or have you served in the  [employer]:   Yes      If Yes, What branch of service:   [employer]      Were you activated, into active duty, as a member of the [employer] or as a Reservist:   No      Marital status:         Exercise level:   Moderate      Diet:   Regular      General stress level:   High      Alcohol intake:   None      Caffeine intake:   None      Seat belts used routinely:   Yes      Sunscreen used routinely:   Yes      Smoke alarm in home:   Yes      Advance directive:   Yes      Social Determinants of Health     Financial Resource Strain: High Risk (9/15/2023)    Overall Financial Resource Strain (CARDIA)     Difficulty of Paying Living Expenses: Hard   Food Insecurity: No Food Insecurity (10/20/2023)    Received from [facility]    Hunger Vital Sign     Worried About Running Out of Food in the Last Year: Never true     Ran Out of Food in the Last Year: Never true   Transportation Needs: No Transportation Needs (10/13/2023)    PRAPARE - Transportation     Lack of Transportation (Medical): No     Lack of Transportation (Non-Medical): No   Physical Activity: Not on file   Stress: Not on file   Social Connections: Not on file   Intimate Partner Violence: Not on file   Housing Stability: Low Risk  (10/13/2023)    Housing Stability Vital Sign     Unable to Pay  for Housing in the Last Year: No     Number of Places Lived in the Last Year: 1     Unstable Housing in the Last Year: No     Current Outpatient Medications on File Prior to Visit   Medication Sig    amLODIPine (NORVASC) 10 mg tablet TAKE 1 TABLET BY MOUTH EVERY DAY    Artificial Tear Solution (Just Tears Eye Drops) SOLN INSTILL 1 DROP BOTH EYES TWICE A DAY OR AS NEEDED FOR DRY EYE    aspirin (ECOTRIN LOW STRENGTH) 81 mg EC tablet Take 81 mg by mouth daily    carboxymethylcellulose (REFRESH PLUS) 0.5 % SOLN INSTILL 1 DROP BOTH EYES THREE TIMES A DAY AS NEEDED FOR DRY EYES    clonazePAM (KlonoPIN) 0.5 MG disintegrating tablet Take 1 tablet (0.5 mg total) by mouth 2 (two) times a day    cloNIDine (CATAPRES) 0.1 mg tablet TAKE 1 TABLET BY MOUTH TWICE A DAY    Continuous Blood Gluc  (FreeStyle Hugo 2 [place]) SHAI Check blood sugars multiple times per day    Continuous Blood Gluc Sensor (FreeStyle Hugo 2 Sensor) MISC Check blood sugars multiple times per day    Continuous Blood Gluc Sensor (FreeStyle Hugo 2 Sensor) MISC Check blood sugars multiple times per day    Diclofenac Sodium (VOLTAREN) 1 % APPLY 2GM TO UPPER EXTREMITIES TOPICALLY TWICE A DAY **USE DOSING CARD** (DO NOT EXCEED 16 GRAMS DAILY TO ANY AFFECTED JOINT OF THE LOWER EXTREMITIES. DO NOT EXCEED 8 GRAMS DAILY TO ANY AFFECTED JOINT OF THE UPPER EXTREMITIES. DO NOT EXCEED A TOTAL DOSE OF 32 GRAMS DAILY OVER ALL JOINTS)    glucose blood test strip 1 each by Other route daily as needed Use as instructed    insulin glargine (LANTUS) 100 units/mL subcutaneous injection Inject 40 Units under the skin daily at bedtime    ipratropium-albuterol (DUO-NEB) 0.5-2.5 mg/3 mL nebulizer solution INHALE 1 VIAL IN NEBULIZER TWICE A DAY    ketotifen (ZADITOR) 0.025 % ophthalmic solution INSTILL 1 DROP BOTH EYES TWICE A DAY FOR ALLERGY    latanoprost (XALATAN) 0.005 % ophthalmic solution 1 drop daily at bedtime    lidocaine (LIDODERM) 5 % APPLY UP TO 3 PATCHES  TOPICALLY EVERY DAY FOR PAIN (REMOVE PATCH AFTER 12 HOURS; 12 HOURS ON AND 12 HOURS OFF)    multivitamin (THERAGRAN) TABS Take 1 tablet by mouth daily    oxyCODONE (ROXICODONE) 10 MG TABS Take 1 tablet (10 mg total) by mouth every 6 (six) hours as needed for moderate pain Max Daily Amount: 40 mg    PARoxetine (PAXIL) 30 mg tablet TAKE 1 TABLET BY MOUTH EVERY MORNING    spironolactone (ALDACTONE) 25 mg tablet Take 1 tablet (25 mg total) by mouth daily    traZODone (DESYREL) 50 mg tablet TAKE 1 TABLET BY MOUTH EVERYDAY AT BEDTIME    [DISCONTINUED] Symbicort 160-4.5 MCG/ACT inhaler INHALE 2 PUFFS TWICE A DAY    atorvastatin (LIPITOR) 40 mg tablet TAKE 20MG (ONE-HALF TABLET) BY MOUTH EVERY DAY AT 5 PM FOR CHOLESTEROL (Patient not taking: Reported on 12/18/2023)    cholecalciferol (VITAMIN D3) 400 units tablet Take 400 Units by mouth daily (Patient not taking: Reported on 12/18/2023)    gabapentin (Neurontin) 300 mg capsule Take 1 capsule (300 mg total) by mouth 3 (three) times a day (Patient taking differently: Take 300 mg by mouth 2 (two) times a day)    omeprazole (PriLOSEC) 40 MG capsule Take 1 capsule (40 mg total) by mouth daily for 14 days     Allergies   Allergen Reactions    Lisinopril Anaphylaxis    Metoprolol Bradycardia     Immunization History   Administered Date(s) Administered    COVID-19 MODERNA VACC 0.5 ML IM 07/02/2021, 07/30/2021, 01/25/2022    COVID-19, unspecified 04/28/2021    DT (pediatric) 05/12/1998    INFLUENZA 11/06/2020, 11/01/2021, 10/15/2022, 09/15/2023, 10/10/2023    Influenza Quadrivalent 3 years and older 11/06/2020, 11/01/2021, 10/15/2022    Influenza, high dose seasonal 0.7 mL 11/16/2020, 11/12/2021, 10/24/2022, 09/15/2023    Pneumococcal 06/23/2009    Pneumococcal Conjugate 13-Valent 03/19/2018    Pneumococcal Polysaccharide PPV23 11/02/2018    Tdap 11/02/2018    Tetanus Toxoid, Unspecified 09/01/2007    Tetanus, adsorbed 09/01/2007       Objective     /60 (BP Location: Left arm,  "Patient Position: Sitting, Cuff Size: Large)   Pulse 86   Temp (!) 97.3 °F (36.3 °C) (Temporal)   Resp 16   Ht 5' 7\" (1.702 m)   Wt 101 kg (222 lb 12.8 oz)   SpO2 95%   BMI 34.90 kg/m²     Physical Exam  Vitals and nursing note reviewed.   Constitutional:       Appearance: He is well-developed.   HENT:      Head: Normocephalic and atraumatic.      Nose: Nose normal.   Eyes:      General: No scleral icterus.     Conjunctiva/sclera: Conjunctivae normal.      Pupils: Pupils are equal, round, and reactive to light.   Neck:      Thyroid: No thyromegaly.   Cardiovascular:      Rate and Rhythm: Normal rate and regular rhythm.      Heart sounds: Normal heart sounds.   Pulmonary:      Effort: Pulmonary effort is normal. No respiratory distress.      Breath sounds: Normal breath sounds. No wheezing.   Abdominal:      General: Bowel sounds are normal.      Palpations: Abdomen is soft.      Tenderness: There is no abdominal tenderness. There is no guarding or rebound.   Musculoskeletal:         General: Normal range of motion.      Cervical back: Normal range of motion and neck supple.   Skin:     General: Skin is warm and dry.      Findings: No rash.   Neurological:      Mental Status: He is alert and oriented to person, place, and time.   Psychiatric:         Mood and Affect: Mood normal.         Behavior: Behavior normal.         Thought Content: Thought content normal.         Judgment: Judgment normal.       Marco Antonio Grove MD    "

## 2023-12-17 NOTE — ASSESSMENT & PLAN NOTE
Lab Results   Component Value Date    EGFR 15 11/13/2023    EGFR 20 10/17/2023    EGFR 21 10/16/2023    CREATININE 3.58 (H) 11/13/2023    CREATININE 2.88 (H) 10/17/2023    CREATININE 2.70 (H) 10/16/2023   Pt to avoid NSAIDs and any IV dyes. Patient to follow up eoither with nephrology or  with us for  further  monitoring of  renal function.

## 2023-12-17 NOTE — ASSESSMENT & PLAN NOTE
Patient is stable with current meds and discussed a low carb diet. Pt  recommended to see eye doctor and foot doctor for routine screening as per protocol. Recheck A1C  and Cr in 3 months. Patient questions answered today about this condtion.   Lab Results   Component Value Date    HGBA1C 7.9 (H) 11/13/2023

## 2023-12-17 NOTE — ASSESSMENT & PLAN NOTE
Patient  is stable with current medication and we discussed a low fat low cholesterol diet. Weight loss also discussed for this will help lower cholesterol also. Recheck lipids in 6 months.

## 2023-12-17 NOTE — ASSESSMENT & PLAN NOTE
Patient is stable with current anti-hypertensive medicine and continue to follow a low sodium diet and take current medication. All questions about this condition were answered today.    not examined

## 2023-12-17 NOTE — ASSESSMENT & PLAN NOTE
Patient to continue utilizing medical therapy as well and counseling sources as applicable for condition. If  suicidal thought or fear of suicide to contact 911 and seek help immediately. Meds reviewed and patient questions answered today

## 2023-12-18 ENCOUNTER — OFFICE VISIT (OUTPATIENT)
Dept: FAMILY MEDICINE CLINIC | Facility: CLINIC | Age: 78
End: 2023-12-18
Payer: COMMERCIAL

## 2023-12-18 VITALS
RESPIRATION RATE: 16 BRPM | BODY MASS INDEX: 34.97 KG/M2 | SYSTOLIC BLOOD PRESSURE: 100 MMHG | TEMPERATURE: 97.3 F | OXYGEN SATURATION: 95 % | HEART RATE: 86 BPM | WEIGHT: 222.8 LBS | DIASTOLIC BLOOD PRESSURE: 60 MMHG | HEIGHT: 67 IN

## 2023-12-18 DIAGNOSIS — J44.9 CHRONIC OBSTRUCTIVE PULMONARY DISEASE, UNSPECIFIED COPD TYPE (HCC): ICD-10-CM

## 2023-12-18 DIAGNOSIS — C61 PROSTATE CANCER (HCC): ICD-10-CM

## 2023-12-18 DIAGNOSIS — E78.2 MIXED HYPERLIPIDEMIA: ICD-10-CM

## 2023-12-18 DIAGNOSIS — F33.40 RECURRENT MAJOR DEPRESSIVE DISORDER, IN REMISSION (HCC): ICD-10-CM

## 2023-12-18 DIAGNOSIS — Z65.8 SOCIAL DISCORD: ICD-10-CM

## 2023-12-18 DIAGNOSIS — Z79.4 TYPE 2 DIABETES MELLITUS WITHOUT COMPLICATION, WITH LONG-TERM CURRENT USE OF INSULIN (HCC): ICD-10-CM

## 2023-12-18 DIAGNOSIS — E66.01 OBESITY, MORBID (HCC): ICD-10-CM

## 2023-12-18 DIAGNOSIS — E11.40 TYPE 2 DIABETES MELLITUS WITH DIABETIC NEUROPATHY, WITHOUT LONG-TERM CURRENT USE OF INSULIN (HCC): Primary | ICD-10-CM

## 2023-12-18 DIAGNOSIS — I10 ESSENTIAL HYPERTENSION: ICD-10-CM

## 2023-12-18 DIAGNOSIS — N18.4 STAGE 4 CHRONIC KIDNEY DISEASE (HCC): ICD-10-CM

## 2023-12-18 DIAGNOSIS — E11.9 TYPE 2 DIABETES MELLITUS WITHOUT COMPLICATION, WITH LONG-TERM CURRENT USE OF INSULIN (HCC): ICD-10-CM

## 2023-12-18 PROCEDURE — 99214 OFFICE O/P EST MOD 30 MIN: CPT | Performed by: FAMILY MEDICINE

## 2023-12-18 NOTE — PROGRESS NOTES
Name: Adam Stone      : 1945      MRN: [id number]  Encounter Provider: Marco Antonio Grove MD  Encounter Date: 2023   Encounter department: [facility]    Assessment & Plan     1. Type 2 diabetes mellitus with diabetic neuropathy, without long-term current use of insulin (HCC)  -     IRIS Diabetic eye exam    2. Type 2 diabetes mellitus without complication, with long-term current use of insulin (HCC)  Assessment & Plan:  Patient is stable with current meds and discussed a low carb diet. Pt  recommended to see eye doctor and foot doctor for routine screening as per protocol. Recheck A1C  and Cr in 3 months. Patient questions answered today about this condtion.   Lab Results   Component Value Date    HGBA1C 7.9 (H) 2023         3. Stage 4 chronic kidney disease (HCC)  Assessment & Plan:  Lab Results   Component Value Date    EGFR 15 2023    EGFR 20 10/17/2023    EGFR 21 10/16/2023    CREATININE 3.58 (H) 2023    CREATININE 2.88 (H) 10/17/2023    CREATININE 2.70 (H) 10/16/2023   Pt to avoid NSAIDs and any IV dyes. Patient to follow up eoither with nephrology or  with us for  further  monitoring of  renal function.       4. Recurrent major depressive disorder, in remission (HCC)  Assessment & Plan:  Patient to continue utilizing medical therapy as well and counseling sources as applicable for condition. If  suicidal thought or fear of suicide to contact 911 and seek help immediately. Meds reviewed and patient questions answered today       5. Prostate cancer (HCC)  Assessment & Plan:  Patient is stable  and will continue present plan of care and reassess at next routine visit. All questions about this problem from patient were answered today.       6. Obesity, morbid (HCC)  Assessment & Plan:  Patient to increase exercise and partake of a diet with less calories to promote  weight loss       7. Mixed hyperlipidemia  Assessment & Plan:  Patient  is stable  with current medication and we discussed a low fat low cholesterol diet. Weight loss also discussed for this will help lower cholesterol also. Recheck lipids in 6 months.       8. Essential hypertension  Assessment & Plan:  Patient is stable with current anti-hypertensive medicine and continue to follow a low sodium diet and take current medication. All questions about this condition were answered today.       9. Chronic obstructive pulmonary disease, unspecified COPD type (HCC)  Assessment & Plan:  Continue with current inhalation therapy to maximize lung function. AVOID ALL TOBACCO USAGE and please obtain flu vaccine  yearly.              Subjective     HPI  Review of Systems    Past Medical History:   Diagnosis Date    Cancer (HCC)     PROSTATE CANCER    Colon polyp     Hyperlipidemia     PTSD (post-traumatic stress disorder)     Type 2 diabetes mellitus without complication, with long-term current use of insulin (HCC) 2020    Type 2 diabetes mellitus without complication, with long-term current use of insulin (HCC) 2020     Past Surgical History:   Procedure Laterality Date    APPENDECTOMY       Family History   Problem Relation Age of Onset    Diabetes Mother     Hypertension Mother     Multiple sclerosis Sister     Parkinsonism Sister      Social History     Socioeconomic History    Marital status: /Civil Union     Spouse name: None    Number of children: None    Years of education: None    Highest education level: None   Occupational History    None   Tobacco Use    Smoking status: Never    Smokeless tobacco: Never   Vaping Use    Vaping status: Never Used   Substance and Sexual Activity    Alcohol use: Not Currently    Drug use: Never    Sexual activity: None   Other Topics Concern    None   Social History Narrative    Most recent tobacco use screenin-      Do you currently or have you served in the [employer]:   Yes      If Yes, What branch of service:   [employer]      Were  you activated, into active duty, as a member of the [employer] or as a Reservist:   No      Marital status:         Exercise level:   Moderate      Diet:   Regular      General stress level:   High      Alcohol intake:   None      Caffeine intake:   None      Seat belts used routinely:   Yes      Sunscreen used routinely:   Yes      Smoke alarm in home:   Yes      Advance directive:   Yes      Social Determinants of Health     Financial Resource Strain: High Risk (9/15/2023)    Overall Financial Resource Strain (CARDIA)     Difficulty of Paying Living Expenses: Hard   Food Insecurity: No Food Insecurity (10/20/2023)    Received from [facility]    Hunger Vital Sign     Worried About Running Out of Food in the Last Year: Never true     Ran Out of Food in the Last Year: Never true   Transportation Needs: No Transportation Needs (10/13/2023)    PRAPARE - Transportation     Lack of Transportation (Medical): No     Lack of Transportation (Non-Medical): No   Physical Activity: Not on file   Stress: Not on file   Social Connections: Not on file   Intimate Partner Violence: Not on file   Housing Stability: Low Risk  (10/13/2023)    Housing Stability Vital Sign     Unable to Pay for Housing in the Last Year: No     Number of Places Lived in the Last Year: 1     Unstable Housing in the Last Year: No     Current Outpatient Medications on File Prior to Visit   Medication Sig    amLODIPine (NORVASC) 10 mg tablet TAKE 1 TABLET BY MOUTH EVERY DAY    Artificial Tear Solution (Just Tears Eye Drops) SOLN INSTILL 1 DROP BOTH EYES TWICE A DAY OR AS NEEDED FOR DRY EYE    aspirin (ECOTRIN LOW STRENGTH) 81 mg EC tablet Take 81 mg by mouth daily    carboxymethylcellulose (REFRESH PLUS) 0.5 % SOLN INSTILL 1 DROP BOTH EYES THREE TIMES A DAY AS NEEDED FOR DRY EYES    clonazePAM (KlonoPIN) 0.5 MG disintegrating tablet Take 1 tablet (0.5 mg total) by mouth 2 (two) times a day    cloNIDine (CATAPRES) 0.1 mg tablet TAKE 1 TABLET BY MOUTH  TWICE A DAY    Continuous Blood Gluc  (FreeStyle Hugo 2 [place]) SHAI Check blood sugars multiple times per day    Continuous Blood Gluc Sensor (FreeStyle Hugo 2 Sensor) MISC Check blood sugars multiple times per day    Continuous Blood Gluc Sensor (FreeStyle Hugo 2 Sensor) MISC Check blood sugars multiple times per day    Diclofenac Sodium (VOLTAREN) 1 % APPLY 2GM TO UPPER EXTREMITIES TOPICALLY TWICE A DAY **USE DOSING CARD** (DO NOT EXCEED 16 GRAMS DAILY TO ANY AFFECTED JOINT OF THE LOWER EXTREMITIES. DO NOT EXCEED 8 GRAMS DAILY TO ANY AFFECTED JOINT OF THE UPPER EXTREMITIES. DO NOT EXCEED A TOTAL DOSE OF 32 GRAMS DAILY OVER ALL JOINTS)    glucose blood test strip 1 each by Other route daily as needed Use as instructed    insulin glargine (LANTUS) 100 units/mL subcutaneous injection Inject 40 Units under the skin daily at bedtime    ipratropium-albuterol (DUO-NEB) 0.5-2.5 mg/3 mL nebulizer solution INHALE 1 VIAL IN NEBULIZER TWICE A DAY    ketotifen (ZADITOR) 0.025 % ophthalmic solution INSTILL 1 DROP BOTH EYES TWICE A DAY FOR ALLERGY    latanoprost (XALATAN) 0.005 % ophthalmic solution 1 drop daily at bedtime    lidocaine (LIDODERM) 5 % APPLY UP TO 3 PATCHES TOPICALLY EVERY DAY FOR PAIN (REMOVE PATCH AFTER 12 HOURS; 12 HOURS ON AND 12 HOURS OFF)    multivitamin (THERAGRAN) TABS Take 1 tablet by mouth daily    oxyCODONE (ROXICODONE) 10 MG TABS Take 1 tablet (10 mg total) by mouth every 6 (six) hours as needed for moderate pain Max Daily Amount: 40 mg    PARoxetine (PAXIL) 30 mg tablet TAKE 1 TABLET BY MOUTH EVERY MORNING    spironolactone (ALDACTONE) 25 mg tablet Take 1 tablet (25 mg total) by mouth daily    traZODone (DESYREL) 50 mg tablet TAKE 1 TABLET BY MOUTH EVERYDAY AT BEDTIME    [DISCONTINUED] Symbicort 160-4.5 MCG/ACT inhaler INHALE 2 PUFFS TWICE A DAY    atorvastatin (LIPITOR) 40 mg tablet TAKE 20MG (ONE-HALF TABLET) BY MOUTH EVERY DAY AT 5 PM FOR CHOLESTEROL (Patient not taking: Reported on  "12/18/2023)    cholecalciferol (VITAMIN D3) 400 units tablet Take 400 Units by mouth daily (Patient not taking: Reported on 12/18/2023)    gabapentin (Neurontin) 300 mg capsule Take 1 capsule (300 mg total) by mouth 3 (three) times a day (Patient taking differently: Take 300 mg by mouth 2 (two) times a day)    omeprazole (PriLOSEC) 40 MG capsule Take 1 capsule (40 mg total) by mouth daily for 14 days     Allergies   Allergen Reactions    Lisinopril Anaphylaxis    Metoprolol Bradycardia     Immunization History   Administered Date(s) Administered    COVID-19 MODERNA VACC 0.5 ML IM 07/02/2021, 07/30/2021, 01/25/2022    COVID-19, unspecified 04/28/2021    DT (pediatric) 05/12/1998    INFLUENZA 11/06/2020, 11/01/2021, 10/15/2022, 09/15/2023, 10/10/2023    Influenza Quadrivalent 3 years and older 11/06/2020, 11/01/2021, 10/15/2022    Influenza, high dose seasonal 0.7 mL 11/16/2020, 11/12/2021, 10/24/2022, 09/15/2023    Pneumococcal 06/23/2009    Pneumococcal Conjugate 13-Valent 03/19/2018    Pneumococcal Polysaccharide PPV23 11/02/2018    Tdap 11/02/2018    Tetanus Toxoid, Unspecified 09/01/2007    Tetanus, adsorbed 09/01/2007       Objective     /60 (BP Location: Left arm, Patient Position: Sitting, Cuff Size: Large)   Pulse 86   Temp (!) 97.3 °F (36.3 °C) (Temporal)   Resp 16   Ht 5' 7\" (1.702 m)   Wt 101 kg (222 lb 12.8 oz)   SpO2 95%   BMI 34.90 kg/m²     Physical Exam  Marco Antonio Grove MD    "

## 2023-12-19 ENCOUNTER — PATIENT OUTREACH (OUTPATIENT)
Dept: FAMILY MEDICINE CLINIC | Facility: CLINIC | Age: 78
End: 2023-12-19

## 2023-12-19 NOTE — PROGRESS NOTES
"MERCEDES received referral from PCP.  Patient is requesting a motorized w/c.  PCP has documented need for same.      Chart review completed.  Patient is currently receiving home care services with [facility].  [facility]  Patient believes he may need dialysis in the near future.  Patient is in the process of purchasing an Peer[employer] One portable concentrator for O2 use.  Patient is now PRN use of same.    Phone call placed to patient.  Patient currently uses a \"broke down scooter\" the [facility] provided him.  Patient also has a rolling walker and manual wheel chair.  Patient reports that scooter provided by the VA was too small.  Patient reports that the [facility] will not provide another motorized scooter despite patient being service connected.      Patient reports he has home O2 that he uses PRN and has 12 tanks in his home.  The [facility] replenishes them when needed.      Patient was receiving services from [facility].  Patient states he was d/c yesterday.      Patient reports that wife assist patient with bathing and dressing.  Wife does all meal preparation.  Wife also provides transportation to medical appointments.      Patient owns his home and continues to have a mortgage.  Patient also has yearly community dues.      Patient reports that their electric and water bills are high.      Patient reports he receives no resources in the home.  Patient does not qualify for County or State assistance as they were told the household income is over the thresholds.       sent message to Dr. Grove to place order for motorized scooter through [facility] as [facility] does not have access to ordering DME.     "

## 2023-12-20 ENCOUNTER — TELEPHONE (OUTPATIENT)
Age: 78
End: 2023-12-20

## 2023-12-20 LAB
DME PARACHUTE DELIVERY DATE REQUESTED: NORMAL
DME PARACHUTE ITEM DESCRIPTION: NORMAL
DME PARACHUTE ORDER STATUS: NORMAL
DME PARACHUTE SUPPLIER NAME: NORMAL
DME PARACHUTE SUPPLIER PHONE: NORMAL

## 2023-12-20 NOTE — TELEPHONE ENCOUNTER
Patient called in, he wanted to let the doctor know that the  reached out to him and not his wife. He also said they're going to be reaching out to Dr. Grove to refill a prescription.

## 2023-12-21 NOTE — TELEPHONE ENCOUNTER
I have put a consult in with  for his wife. Have them contact me if they have not called her within 48 hours.

## 2023-12-29 ENCOUNTER — TELEPHONE (OUTPATIENT)
Age: 78
End: 2023-12-29

## 2023-12-29 NOTE — TELEPHONE ENCOUNTER
Patient called in wanting to verify if wheelchair order was placed for him. Before I could tell him patient hung up the phone.

## 2023-12-29 NOTE — TELEPHONE ENCOUNTER
Patient called again in regards to wheelchair order.  He is still waiting to hear from someone as to when he is getting the wheelchair.  He stated he talked with Dorie the  about 2 weeks ago and she told him if provider sends rx to her she can get it ordered for him.  Can someone call patient back with info

## 2024-01-02 ENCOUNTER — PATIENT OUTREACH (OUTPATIENT)
Dept: FAMILY MEDICINE CLINIC | Facility: CLINIC | Age: 79
End: 2024-01-02

## 2024-01-02 NOTE — PROGRESS NOTES
SWCM received inbasket message from Angy at office stating patient would like a call from MERCEDES for status of w/c order.  Patient told office that order was to be given to this SW.      CM do not order DME.  All DME orders are placed through [employer] by the provider.  Provider was advised on 12/19/23 to place order and message was received that same was completed.      Office asked to follow up on status of order as SW cannot see [employer] orders.     Inbasket sent to office for provider used.  SW was provided with [employer] phone number    SW sent email to  for assistance.      SW will follow up when DME company is identified.

## 2024-01-03 ENCOUNTER — PATIENT OUTREACH (OUTPATIENT)
Dept: FAMILY MEDICINE CLINIC | Facility: CLINIC | Age: 79
End: 2024-01-03

## 2024-01-03 NOTE — PROGRESS NOTES
Phone call to patient regarding w/c order.  Patient was not available and message left for patient.      Patient returned call immediately.  Patient reports that office sent order to [facility].       Phone call to supplier on the order, [employer].  Automated message states that customers are to call their providers and providers are to contact the supplier with questions regarding order.  There is no supplier listed in the efax order.      SW placed phone call to [facility]. Spoke with Brenda at [facility], they do not have an order for a power w/c.  Last order for this patient was in October 2023 for O2 concentrator that was cancelled.     Phone call to [facility].  Irma checked for an order from PCP, no order received, patient not in system.   Order can be faxed directly to provider at fax #[phone].      Phone call to [facility].  [facility] does not have any record of an order for a power w/c for this patient.  Their fax number is [phone].      Inbasket sent to PCP, [facility], and practice manager to place the order for the powered w/c with one of the suppliers above.

## 2024-01-03 NOTE — PROGRESS NOTES
Inbasket message received that order was faxed to [facility] [facility] [employer].  SW will follow up with DME supplier in 2 days.

## 2024-01-04 NOTE — TELEPHONE ENCOUNTER
Felecia with [employer] called stating that any DME orders for [facility] [employer] need to go through [employer]. Please fax powered wheelchair order to [employer] at [phone] and their phone number is [phone].

## 2024-01-23 ENCOUNTER — PATIENT OUTREACH (OUTPATIENT)
Dept: FAMILY MEDICINE CLINIC | Facility: CLINIC | Age: 79
End: 2024-01-23

## 2024-01-23 NOTE — PROGRESS NOTES
SW placed phone call to [facility].      ACP reached out to pt.  ACP cancelled the work order, pt did not want the type of equipment that can be provided.      It was discovered that pt received a  4 wheelchair [facility] last year.  Equipment is not working.  Pt was advised to contact [facility] for repair.  Pt reported that he did not have time and they will not do anything for him anyway.  Pt wanted a 4 wheel scooter.  Insurance does not cover 4 wheeled scooters, only 3 wheeled scooters.  Pt does not wish to proceed.      Phone call placed to patient.  Not moving forward.  [facility] will not provide a larger one for pt.      Someone at [facility] called patient about back pain after [facility].  Going through this since he ws 25yo.  Suggested a [facility], pt reports they are not so good either.    Pt reviewed issues that he has had with [facility] and [facility] advocates in the past.     [facility] would not give him the inogen O2 or the Freestyle.

## 2024-02-20 ENCOUNTER — TELEPHONE (OUTPATIENT)
Age: 79
End: 2024-02-20

## 2024-02-21 NOTE — TELEPHONE ENCOUNTER
Pt was calling to see if there is any updates on the status of the scooter. I informed him once we hear back from Dr. Grove we will give him a call back.

## 2024-02-23 NOTE — TELEPHONE ENCOUNTER
Left message for patient to call back and provide us with the Name of DME company he would like his script for wheelchair scooter sent to.     Script in blue folder

## 2024-03-11 ENCOUNTER — RA CDI HCC (OUTPATIENT)
Dept: OTHER | Facility: HOSPITAL | Age: 79
End: 2024-03-11

## 2024-03-14 ENCOUNTER — TELEPHONE (OUTPATIENT)
Age: 79
End: 2024-03-14

## 2024-03-14 DIAGNOSIS — E78.2 MIXED HYPERLIPIDEMIA: ICD-10-CM

## 2024-03-14 DIAGNOSIS — N18.4 STAGE 4 CHRONIC KIDNEY DISEASE (HCC): Primary | ICD-10-CM

## 2024-03-14 DIAGNOSIS — I10 ESSENTIAL HYPERTENSION: ICD-10-CM

## 2024-03-14 NOTE — TELEPHONE ENCOUNTER
Pt called again to find out if he needs to go for blood work prior to appt.    Informed pt previous message was sent to provider and he will get a call once pcp respond.

## 2024-03-14 NOTE — TELEPHONE ENCOUNTER
Pt wants to know if he needs labs done before his next visit on 3/20/24. Please contact pt and advise. Thank you for your help.

## 2024-03-15 ENCOUNTER — RA CDI HCC (OUTPATIENT)
Dept: OTHER | Facility: HOSPITAL | Age: 79
End: 2024-03-15

## 2024-03-15 PROBLEM — E11.22 TYPE 2 DIABETES MELLITUS WITH CHRONIC KIDNEY DISEASE, WITH LONG-TERM CURRENT USE OF INSULIN (HCC): Status: ACTIVE | Noted: 2024-03-15

## 2024-03-15 PROBLEM — I12.9 HYPERTENSIVE KIDNEY DISEASE WITH CHRONIC KIDNEY DISEASE: Status: ACTIVE | Noted: 2020-05-29

## 2024-03-15 PROBLEM — Z79.4 TYPE 2 DIABETES MELLITUS WITH CHRONIC KIDNEY DISEASE, WITH LONG-TERM CURRENT USE OF INSULIN (HCC): Status: ACTIVE | Noted: 2024-03-15

## 2024-03-15 PROBLEM — I12.9 HYPERTENSIVE KIDNEY DISEASE WITH CHRONIC KIDNEY DISEASE: Status: ACTIVE | Noted: 2024-03-15

## 2024-03-16 NOTE — PROGRESS NOTES
HCC coding opportunities     E11.22, I12.9  IB sent      Chart Reviewed number of suggestions sent to Provider: 2     GR    Patients Insurance     Medicare Insurance: [facility] [employer]

## 2024-03-18 PROBLEM — Z85.46 PERSONAL HISTORY OF PROSTATE CANCER: Status: ACTIVE | Noted: 2024-03-18

## 2024-03-18 PROBLEM — C61 PROSTATE CANCER (HCC): Status: RESOLVED | Noted: 2020-05-29 | Resolved: 2024-03-18

## 2024-04-06 ENCOUNTER — APPOINTMENT (OUTPATIENT)
Age: 79
End: 2024-04-06
Payer: COMMERCIAL

## 2024-04-06 DIAGNOSIS — E78.2 MIXED HYPERLIPIDEMIA: ICD-10-CM

## 2024-04-06 DIAGNOSIS — I10 ESSENTIAL HYPERTENSION: ICD-10-CM

## 2024-04-06 DIAGNOSIS — N18.4 STAGE 4 CHRONIC KIDNEY DISEASE (HCC): ICD-10-CM

## 2024-04-06 LAB
ALBUMIN SERPL BCP-MCNC: 4.5 G/DL (ref 3.5–5)
ALP SERPL-CCNC: 50 U/L (ref 34–104)
ALT SERPL W P-5'-P-CCNC: 15 U/L (ref 7–52)
ANION GAP SERPL CALCULATED.3IONS-SCNC: 9 MMOL/L (ref 4–13)
AST SERPL W P-5'-P-CCNC: 15 U/L (ref 13–39)
BASOPHILS # BLD AUTO: 0.04 THOUSANDS/ÂΜL (ref 0–0.1)
BASOPHILS NFR BLD AUTO: 1 % (ref 0–1)
BILIRUB SERPL-MCNC: 0.67 MG/DL (ref 0.2–1)
BUN SERPL-MCNC: 48 MG/DL (ref 5–25)
CALCIUM SERPL-MCNC: 9.1 MG/DL (ref 8.4–10.2)
CHLORIDE SERPL-SCNC: 107 MMOL/L (ref 96–108)
CHOLEST SERPL-MCNC: 173 MG/DL
CO2 SERPL-SCNC: 20 MMOL/L (ref 21–32)
CREAT SERPL-MCNC: 3.01 MG/DL (ref 0.6–1.3)
EOSINOPHIL # BLD AUTO: 0.12 THOUSAND/ÂΜL (ref 0–0.61)
EOSINOPHIL NFR BLD AUTO: 2 % (ref 0–6)
ERYTHROCYTE [DISTWIDTH] IN BLOOD BY AUTOMATED COUNT: 13.1 % (ref 11.6–15.1)
GFR SERPL CREATININE-BSD FRML MDRD: 19 ML/MIN/1.73SQ M
GLUCOSE P FAST SERPL-MCNC: 108 MG/DL (ref 65–99)
HCT VFR BLD AUTO: 38.1 % (ref 36.5–49.3)
HDLC SERPL-MCNC: 45 MG/DL
HGB BLD-MCNC: 12 G/DL (ref 12–17)
IMM GRANULOCYTES # BLD AUTO: 0.03 THOUSAND/UL (ref 0–0.2)
IMM GRANULOCYTES NFR BLD AUTO: 1 % (ref 0–2)
LDLC SERPL CALC-MCNC: 111 MG/DL (ref 0–100)
LYMPHOCYTES # BLD AUTO: 1.43 THOUSANDS/ÂΜL (ref 0.6–4.47)
LYMPHOCYTES NFR BLD AUTO: 24 % (ref 14–44)
MAGNESIUM SERPL-MCNC: 2.3 MG/DL (ref 1.9–2.7)
MCH RBC QN AUTO: 28.9 PG (ref 26.8–34.3)
MCHC RBC AUTO-ENTMCNC: 31.5 G/DL (ref 31.4–37.4)
MCV RBC AUTO: 92 FL (ref 82–98)
MONOCYTES # BLD AUTO: 0.35 THOUSAND/ÂΜL (ref 0.17–1.22)
MONOCYTES NFR BLD AUTO: 6 % (ref 4–12)
NEUTROPHILS # BLD AUTO: 3.95 THOUSANDS/ÂΜL (ref 1.85–7.62)
NEUTS SEG NFR BLD AUTO: 66 % (ref 43–75)
NRBC BLD AUTO-RTO: 0 /100 WBCS
PLATELET # BLD AUTO: 158 THOUSANDS/UL (ref 149–390)
PMV BLD AUTO: 11.7 FL (ref 8.9–12.7)
POTASSIUM SERPL-SCNC: 4.6 MMOL/L (ref 3.5–5.3)
PROT SERPL-MCNC: 7.2 G/DL (ref 6.4–8.4)
RBC # BLD AUTO: 4.15 MILLION/UL (ref 3.88–5.62)
SODIUM SERPL-SCNC: 136 MMOL/L (ref 135–147)
TRIGL SERPL-MCNC: 83 MG/DL
TSH SERPL DL<=0.05 MIU/L-ACNC: 1.2 UIU/ML (ref 0.45–4.5)
URATE SERPL-MCNC: 7.8 MG/DL (ref 3.5–8.5)
WBC # BLD AUTO: 5.92 THOUSAND/UL (ref 4.31–10.16)

## 2024-04-06 PROCEDURE — 84443 ASSAY THYROID STIM HORMONE: CPT

## 2024-04-06 PROCEDURE — 80053 COMPREHEN METABOLIC PANEL: CPT

## 2024-04-06 PROCEDURE — 84550 ASSAY OF BLOOD/URIC ACID: CPT

## 2024-04-06 PROCEDURE — 83735 ASSAY OF MAGNESIUM: CPT

## 2024-04-06 PROCEDURE — 85025 COMPLETE CBC W/AUTO DIFF WBC: CPT

## 2024-04-06 PROCEDURE — 80061 LIPID PANEL: CPT

## 2024-04-06 PROCEDURE — 36415 COLL VENOUS BLD VENIPUNCTURE: CPT

## 2024-04-08 ENCOUNTER — OFFICE VISIT (OUTPATIENT)
Dept: FAMILY MEDICINE CLINIC | Facility: CLINIC | Age: 79
End: 2024-04-08
Payer: COMMERCIAL

## 2024-04-08 VITALS
HEART RATE: 88 BPM | TEMPERATURE: 97.9 F | OXYGEN SATURATION: 97 % | HEIGHT: 67 IN | BODY MASS INDEX: 34.06 KG/M2 | SYSTOLIC BLOOD PRESSURE: 120 MMHG | WEIGHT: 217 LBS | RESPIRATION RATE: 16 BRPM | DIASTOLIC BLOOD PRESSURE: 60 MMHG

## 2024-04-08 DIAGNOSIS — N18.4 STAGE 4 CHRONIC KIDNEY DISEASE (HCC): Primary | ICD-10-CM

## 2024-04-08 DIAGNOSIS — E78.2 MIXED HYPERLIPIDEMIA: ICD-10-CM

## 2024-04-08 DIAGNOSIS — F33.40 RECURRENT MAJOR DEPRESSIVE DISORDER, IN REMISSION (HCC): ICD-10-CM

## 2024-04-08 DIAGNOSIS — E66.01 OBESITY, MORBID (HCC): ICD-10-CM

## 2024-04-08 DIAGNOSIS — I12.9 HYPERTENSIVE RENAL DISEASE, STAGE 1 THROUGH STAGE 4 OR UNSPECIFIED CHRONIC KIDNEY DISEASE: ICD-10-CM

## 2024-04-08 PROCEDURE — G2211 COMPLEX E/M VISIT ADD ON: HCPCS | Performed by: FAMILY MEDICINE

## 2024-04-08 PROCEDURE — 99214 OFFICE O/P EST MOD 30 MIN: CPT | Performed by: FAMILY MEDICINE

## 2024-04-08 NOTE — ASSESSMENT & PLAN NOTE
Lab Results   Component Value Date    EGFR 19 04/06/2024    EGFR 15 11/13/2023    EGFR 20 10/17/2023    CREATININE 3.01 (H) 04/06/2024    CREATININE 3.58 (H) 11/13/2023    CREATININE 2.88 (H) 10/17/2023   Pt to avoid NSAIDs and any IV dyes. Patient to follow up eoither with nephrology or  with us for  further  monitoring of  renal function.

## 2024-04-08 NOTE — ASSESSMENT & PLAN NOTE
Lab Results   Component Value Date    EGFR 19 04/06/2024    EGFR 15 11/13/2023    EGFR 20 10/17/2023    CREATININE 3.01 (H) 04/06/2024    CREATININE 3.58 (H) 11/13/2023    CREATININE 2.88 (H) 10/17/2023   Patient is stable with current anti-hypertensive medicine and continue to follow a low sodium diet and take current medication. All questions about this condition were answered today.

## 2024-04-08 NOTE — PROGRESS NOTES
Name: Adam Stone      : 1945      MRN: [id number]  Encounter Provider: Marco Antonio Grove MD  Encounter Date: 2024   Encounter department: [facility]    Assessment & Plan     1. Stage 4 chronic kidney disease (HCC)  Assessment & Plan:  Lab Results   Component Value Date    EGFR 19 2024    EGFR 15 2023    EGFR 20 10/17/2023    CREATININE 3.01 (H) 2024    CREATININE 3.58 (H) 2023    CREATININE 2.88 (H) 10/17/2023   Pt to avoid NSAIDs and any IV dyes. Patient to follow up eoither with nephrology or  with us for  further  monitoring of  renal function.       2. Recurrent major depressive disorder, in remission (HCC)  Assessment & Plan:  Patient to continue utilizing medical therapy as well and counseling sources as applicable for condition. If  suicidal thought or fear of suicide to contact 911 and seek help immediately. Meds reviewed and patient questions answered today       3. Obesity, morbid (HCC)  Assessment & Plan:  Patient to increase exercise and partake of a diet with less calories to promote  weight loss       4. Mixed hyperlipidemia  Assessment & Plan:  Patient  is stable with current medication and we discussed a low fat low cholesterol diet. Weight loss also discussed for this will help lower cholesterol also. Recheck lipids in 6 months.       5. Hypertensive renal disease, stage 1 through stage 4 or unspecified chronic kidney disease  Assessment & Plan:  Lab Results   Component Value Date    EGFR 19 2024    EGFR 15 2023    EGFR 20 10/17/2023    CREATININE 3.01 (H) 2024    CREATININE 3.58 (H) 2023    CREATININE 2.88 (H) 10/17/2023   Patient is stable with current anti-hypertensive medicine and continue to follow a low sodium diet and take current medication. All questions about this condition were answered today.            Subjective     HPI  Review of Systems   Constitutional:  Negative for activity change,  appetite change, chills, fatigue, fever and unexpected weight change.   HENT:  Negative for congestion, ear pain, hearing loss, mouth sores, postnasal drip, sinus pressure, sinus pain, sneezing and sore throat.    Respiratory:  Negative for apnea, cough, shortness of breath and wheezing.    Cardiovascular:  Negative for chest pain, palpitations and leg swelling.   Gastrointestinal:  Negative for abdominal pain, constipation, diarrhea, nausea and vomiting.   Endocrine: Negative for cold intolerance and heat intolerance.   Genitourinary:  Negative for dysuria, frequency and hematuria.   Musculoskeletal:  Negative for arthralgias, back pain, gait problem, joint swelling and neck pain.   Skin:  Negative for rash.   Neurological:  Negative for dizziness, weakness and numbness.   Hematological:  Does not bruise/bleed easily.   Psychiatric/Behavioral:  Negative for agitation, behavioral problems, confusion, hallucinations and sleep disturbance. The patient is not nervous/anxious.        Past Medical History:   Diagnosis Date   • Cancer (HCC)     PROSTATE CANCER   • Colon polyp    • Hyperlipidemia    • Prostate cancer ([facility]) 05/29/2020   • PTSD (post-traumatic stress disorder)    • Type 2 diabetes mellitus without complication, with long-term current use of insulin ([facility]) 05/29/2020   • Type 2 diabetes mellitus without complication, with long-term current use of insulin ([facility]) 05/29/2020     Past Surgical History:   Procedure Laterality Date   • APPENDECTOMY       Family History   Problem Relation Age of Onset   • Diabetes Mother    • Hypertension Mother    • Multiple sclerosis Sister    • Parkinsonism Sister      Social History     Socioeconomic History   • Marital status: /Civil Union     Spouse name: None   • Number of children: None   • Years of education: None   • Highest education level: None   Occupational History   • None   Tobacco Use   • Smoking status: Never   • Smokeless tobacco: Never   Vaping Use   •  Vaping status: Never Used   Substance and Sexual Activity   • Alcohol use: Not Currently   • Drug use: Never   • Sexual activity: None   Other Topics Concern   • None   Social History Narrative    Most recent tobacco use screenin-      Do you currently or have you served in the [employer]:   Yes      If Yes, What branch of service:   [employer]      Were you activated, into active duty, as a member of the [employer] or as a Reservist:   No      Marital status:         Exercise level:   Moderate      Diet:   Regular      General stress level:   High      Alcohol intake:   None      Caffeine intake:   None      Seat belts used routinely:   Yes      Sunscreen used routinely:   Yes      Smoke alarm in home:   Yes      Advance directive:   Yes      Social Determinants of Health     Financial Resource Strain: High Risk (9/15/2023)    Overall Financial Resource Strain (CARDIA)    • Difficulty of Paying Living Expenses: Hard   Food Insecurity: No Food Insecurity (10/20/2023)    Received from [facility]    Hunger Vital Sign    • Worried About Running Out of Food in the Last Year: Never true    • Ran Out of Food in the Last Year: Never true   Transportation Needs: No Transportation Needs (10/13/2023)    PRAPARE - Transportation    • Lack of Transportation (Medical): No    • Lack of Transportation (Non-Medical): No   Physical Activity: Not on file   Stress: Not on file   Social Connections: Not on file   Intimate Partner Violence: Not on file   Housing Stability: Low Risk  (10/13/2023)    Housing Stability Vital Sign    • Unable to Pay for Housing in the Last Year: No    • Number of Places Lived in the Last Year: 1    • Unstable Housing in the Last Year: No     Current Outpatient Medications on File Prior to Visit   Medication Sig   • amLODIPine (NORVASC) 10 mg tablet TAKE 1 TABLET BY MOUTH EVERY DAY   • Artificial Tear Solution (Just Tears Eye Drops) SOLN INSTILL 1 DROP BOTH EYES TWICE A DAY OR AS  NEEDED FOR DRY EYE   • aspirin (ECOTRIN LOW STRENGTH) 81 mg EC tablet Take 81 mg by mouth daily   • carboxymethylcellulose (REFRESH PLUS) 0.5 % SOLN INSTILL 1 DROP BOTH EYES THREE TIMES A DAY AS NEEDED FOR DRY EYES   • clonazePAM (KlonoPIN) 0.5 MG disintegrating tablet Take 1 tablet (0.5 mg total) by mouth 2 (two) times a day   • cloNIDine (CATAPRES) 0.1 mg tablet TAKE 1 TABLET BY MOUTH TWICE A DAY   • Continuous Blood Gluc  (FreeStyle [place] 2 [place]) SHAI Check blood sugars multiple times per day   • Continuous Blood Gluc Sensor (FreeStyle Hugo 2 Sensor) MISC Check blood sugars multiple times per day   • Continuous Blood Gluc Sensor (FreeStyle Hugo 2 Sensor) MISC Check blood sugars multiple times per day   • Diclofenac Sodium (VOLTAREN) 1 % APPLY 2GM TO UPPER EXTREMITIES TOPICALLY TWICE A DAY **USE DOSING CARD** (DO NOT EXCEED 16 GRAMS DAILY TO ANY AFFECTED JOINT OF THE LOWER EXTREMITIES. DO NOT EXCEED 8 GRAMS DAILY TO ANY AFFECTED JOINT OF THE UPPER EXTREMITIES. DO NOT EXCEED A TOTAL DOSE OF 32 GRAMS DAILY OVER ALL JOINTS)   • ipratropium-albuterol (DUO-NEB) 0.5-2.5 mg/3 mL nebulizer solution INHALE 1 VIAL IN NEBULIZER TWICE A DAY   • ketotifen (ZADITOR) 0.025 % ophthalmic solution INSTILL 1 DROP BOTH EYES TWICE A DAY FOR ALLERGY   • latanoprost (XALATAN) 0.005 % ophthalmic solution 1 drop daily at bedtime   • lidocaine (LIDODERM) 5 % APPLY UP TO 3 PATCHES TOPICALLY EVERY DAY FOR PAIN (REMOVE PATCH AFTER 12 HOURS; 12 HOURS ON AND 12 HOURS OFF)   • multivitamin (THERAGRAN) TABS Take 1 tablet by mouth daily   • oxyCODONE (ROXICODONE) 10 MG TABS Take 1 tablet (10 mg total) by mouth every 6 (six) hours as needed for moderate pain Max Daily Amount: 40 mg   • PARoxetine (PAXIL) 30 mg tablet TAKE 1 TABLET BY MOUTH EVERY MORNING   • semaglutide, 0.25 or 0.5 mg/dose, (Ozempic) 2 mg/3 mL injection pen INJECT 0.5MG SUBCUTANEOUSLY ONCE WEEKLY FOR DIABETES   • spironolactone (ALDACTONE) 25 mg tablet Take 1 tablet (25  "mg total) by mouth daily   • traZODone (DESYREL) 50 mg tablet TAKE 1 TABLET BY MOUTH EVERYDAY AT BEDTIME   • cholecalciferol (VITAMIN D3) 400 units tablet Take 400 Units by mouth daily (Patient not taking: Reported on 12/18/2023)   • gabapentin (Neurontin) 300 mg capsule Take 1 capsule (300 mg total) by mouth 3 (three) times a day (Patient taking differently: Take 300 mg by mouth 2 (two) times a day)   • glucose blood test strip 1 each by Other route daily as needed Use as instructed   • insulin glargine (LANTUS) 100 units/mL subcutaneous injection Inject 40 Units under the skin daily at bedtime (Patient not taking: Reported on 4/8/2024)   • omeprazole (PriLOSEC) 40 MG capsule Take 1 capsule (40 mg total) by mouth daily for 14 days   • [DISCONTINUED] atorvastatin (LIPITOR) 40 mg tablet TAKE 20MG (ONE-HALF TABLET) BY MOUTH EVERY DAY AT 5 PM FOR CHOLESTEROL (Patient not taking: Reported on 12/18/2023)     Allergies   Allergen Reactions   • Lisinopril Anaphylaxis   • Metoprolol Bradycardia     Immunization History   Administered Date(s) Administered   • COVID-19 MODERNA VACC 0.5 ML IM 07/02/2021, 07/30/2021, 01/25/2022, 07/06/2022   • COVID-19, unspecified 04/28/2021   • DT (pediatric) 05/12/1998   • INFLUENZA 11/06/2020, 11/01/2021, 10/15/2022, 09/15/2023, 10/10/2023   • Influenza Quadrivalent 3 years and older 11/06/2020, 11/01/2021, 10/15/2022   • Influenza, high dose seasonal 0.7 mL 11/16/2020, 11/12/2021, 10/24/2022, 09/15/2023   • Pneumococcal 06/23/2009   • Pneumococcal Conjugate 13-Valent 03/19/2018   • Pneumococcal Polysaccharide PPV23 11/02/2018   • Tdap 11/02/2018   • Tetanus Toxoid, Unspecified 09/01/2007   • Tetanus, adsorbed 09/01/2007       Objective     /60 (BP Location: Left arm, Patient Position: Sitting, Cuff Size: Large)   Pulse 88   Temp 97.9 °F (36.6 °C) (Temporal)   Resp 16   Ht 5' 7\" (1.702 m)   Wt 98.4 kg (217 lb)   SpO2 97%   BMI 33.99 kg/m²     Physical Exam  Vitals and nursing " note reviewed.   Constitutional:       Appearance: He is well-developed. He is obese.   HENT:      Head: Normocephalic and atraumatic.      Nose: Nose normal.   Eyes:      General: No scleral icterus.     Conjunctiva/sclera: Conjunctivae normal.      Pupils: Pupils are equal, round, and reactive to light.   Neck:      Thyroid: No thyromegaly.   Cardiovascular:      Rate and Rhythm: Normal rate and regular rhythm.      Heart sounds: Normal heart sounds.   Pulmonary:      Effort: Pulmonary effort is normal. No respiratory distress.      Breath sounds: Normal breath sounds. No wheezing.   Abdominal:      General: Bowel sounds are normal.      Palpations: Abdomen is soft.      Tenderness: There is no abdominal tenderness. There is no guarding or rebound.   Musculoskeletal:         General: Normal range of motion.      Cervical back: Normal range of motion and neck supple.   Skin:     General: Skin is warm and dry.      Findings: No rash.   Neurological:      Mental Status: He is alert and oriented to person, place, and time.   Psychiatric:         Mood and Affect: Mood normal.         Behavior: Behavior normal.         Thought Content: Thought content normal.         Judgment: Judgment normal.       Marco Antonio Grove MD

## 2024-04-30 ENCOUNTER — TELEPHONE (OUTPATIENT)
Age: 79
End: 2024-04-30

## 2024-04-30 NOTE — TELEPHONE ENCOUNTER
Pt is calling back with fax number for [employer] . Pt is requesting a Rx for a scooter with 4 wheels . Please fax Rx to [phone]. Please f/u . Thank you .

## 2024-04-30 NOTE — TELEPHONE ENCOUNTER
FYI: Pt stated [facility] requested a script for a 4 wheel scooter as the previous script was sent for a wheelchair and not a scooter. Pt will call bk will the fax number.

## 2024-04-30 NOTE — TELEPHONE ENCOUNTER
Pt called to say he was not able to get the fax number for [employer]. Their phone is [phone]. He states they are part of [employer]. What he needs is a four wheel scooter ordered through them. Pt will try calling them again to get their fax number, when he called previously they did not answer.

## 2024-07-09 ENCOUNTER — TELEPHONE (OUTPATIENT)
Age: 79
End: 2024-07-09

## 2024-07-09 DIAGNOSIS — E11.40 TYPE 2 DIABETES MELLITUS WITH DIABETIC NEUROPATHY, WITHOUT LONG-TERM CURRENT USE OF INSULIN (HCC): ICD-10-CM

## 2024-07-09 NOTE — TELEPHONE ENCOUNTER
[facility] They stopped giving the patient the meds due to misunderstanding about how many pills he received. Please requesting PCP order the medication . Please advise

## 2024-07-10 ENCOUNTER — RA CDI HCC (OUTPATIENT)
Dept: OTHER | Facility: HOSPITAL | Age: 79
End: 2024-07-10

## 2024-07-10 DIAGNOSIS — E11.40 TYPE 2 DIABETES MELLITUS WITH DIABETIC NEUROPATHY, WITHOUT LONG-TERM CURRENT USE OF INSULIN (HCC): ICD-10-CM

## 2024-07-10 RX ORDER — GABAPENTIN 300 MG/1
300 CAPSULE ORAL 2 TIMES DAILY
Qty: 60 CAPSULE | Refills: 5 | Status: SHIPPED | OUTPATIENT
Start: 2024-07-10 | End: 2024-08-09

## 2024-07-17 ENCOUNTER — TELEPHONE (OUTPATIENT)
Age: 79
End: 2024-07-17

## 2024-07-17 DIAGNOSIS — E11.9 TYPE 2 DIABETES MELLITUS WITHOUT COMPLICATION, WITH LONG-TERM CURRENT USE OF INSULIN (HCC): Primary | ICD-10-CM

## 2024-07-17 DIAGNOSIS — Z79.4 TYPE 2 DIABETES MELLITUS WITHOUT COMPLICATION, WITH LONG-TERM CURRENT USE OF INSULIN (HCC): Primary | ICD-10-CM

## 2024-07-17 NOTE — TELEPHONE ENCOUNTER
Spouse called in asking if PCP would like pt to complete blood work before appt on 7/19. Please advise.

## 2024-07-18 ENCOUNTER — APPOINTMENT (OUTPATIENT)
Age: 79
End: 2024-07-18
Payer: COMMERCIAL

## 2024-07-18 ENCOUNTER — RA CDI HCC (OUTPATIENT)
Dept: OTHER | Facility: HOSPITAL | Age: 79
End: 2024-07-18

## 2024-07-18 DIAGNOSIS — Z79.4 TYPE 2 DIABETES MELLITUS WITHOUT COMPLICATION, WITH LONG-TERM CURRENT USE OF INSULIN (HCC): ICD-10-CM

## 2024-07-18 DIAGNOSIS — E11.9 TYPE 2 DIABETES MELLITUS WITHOUT COMPLICATION, WITH LONG-TERM CURRENT USE OF INSULIN (HCC): ICD-10-CM

## 2024-07-18 LAB
ALBUMIN SERPL BCG-MCNC: 4.1 G/DL (ref 3.5–5)
ALP SERPL-CCNC: 48 U/L (ref 34–104)
ALT SERPL W P-5'-P-CCNC: 15 U/L (ref 7–52)
ANION GAP SERPL CALCULATED.3IONS-SCNC: 7 MMOL/L (ref 4–13)
AST SERPL W P-5'-P-CCNC: 12 U/L (ref 13–39)
BILIRUB SERPL-MCNC: 0.43 MG/DL (ref 0.2–1)
BUN SERPL-MCNC: 32 MG/DL (ref 5–25)
CALCIUM SERPL-MCNC: 9.5 MG/DL (ref 8.4–10.2)
CHLORIDE SERPL-SCNC: 106 MMOL/L (ref 96–108)
CO2 SERPL-SCNC: 26 MMOL/L (ref 21–32)
CREAT SERPL-MCNC: 2.52 MG/DL (ref 0.6–1.3)
CREAT UR-MCNC: 194.7 MG/DL
EST. AVERAGE GLUCOSE BLD GHB EST-MCNC: 100 MG/DL
GFR SERPL CREATININE-BSD FRML MDRD: 23 ML/MIN/1.73SQ M
GLUCOSE SERPL-MCNC: 109 MG/DL (ref 65–140)
HBA1C MFR BLD: 5.1 %
MICROALBUMIN UR-MCNC: 12.9 MG/L
MICROALBUMIN/CREAT 24H UR: 7 MG/G CREATININE (ref 0–30)
POTASSIUM SERPL-SCNC: 5 MMOL/L (ref 3.5–5.3)
PROT SERPL-MCNC: 6.6 G/DL (ref 6.4–8.4)
SODIUM SERPL-SCNC: 139 MMOL/L (ref 135–147)

## 2024-07-18 PROCEDURE — 82043 UR ALBUMIN QUANTITATIVE: CPT

## 2024-07-18 PROCEDURE — 80053 COMPREHEN METABOLIC PANEL: CPT

## 2024-07-18 PROCEDURE — 82570 ASSAY OF URINE CREATININE: CPT

## 2024-07-18 PROCEDURE — 83036 HEMOGLOBIN GLYCOSYLATED A1C: CPT

## 2024-07-18 PROCEDURE — 36415 COLL VENOUS BLD VENIPUNCTURE: CPT

## 2024-07-18 NOTE — PROGRESS NOTES
Ambulatory Visit  Name: Adam Stone      : 1945      MRN: [id number]  Encounter Provider: Marco Antonio Grove MD  Encounter Date: 2024   Encounter department: [facility]    Assessment & Plan   1. Cardiomyopathy, unspecified type (HCC)  Assessment & Plan:  Patient is stable  and will continue present plan of care and reassess at next routine visit. All questions about this problem from patient were answered today.   2. Chronic obstructive pulmonary disease, unspecified COPD type (HCC)  Assessment & Plan:  Continue with current inhalation therapy to maximize lung function. AVOID ALL TOBACCO USAGE and please obtain flu vaccine  yearly.   3. Recurrent major depressive disorder, in remission (HCC)  Assessment & Plan:  Patient to continue utilizing medical therapy as well and counseling sources as applicable for condition. If  suicidal thought or fear of suicide to contact 911 and seek help immediately. Meds reviewed and patient questions answered today   4. Mixed hyperlipidemia  Assessment & Plan:  Patient  is stable with current medication and we discussed a low fat low cholesterol diet. Weight loss also discussed for this will help lower cholesterol also. Recheck lipids in 6 months.   5. Malignant neoplasm of lower lobe of left lung (HCC)  Assessment & Plan:  Patient is stable  and will continue present plan of care and reassess at next routine visit. All questions about this problem from patient were answered today.   6. Personal history of prostate cancer  Assessment & Plan:  Patient is stable  and will continue present plan of care and reassess at next routine visit. All questions about this problem from patient were answered today.   7. Type 2 diabetes mellitus with stage 4 chronic kidney disease, with long-term current use of insulin (HCC)  Assessment & Plan:  Needs A1C  Lab Results   Component Value Date    HGBA1C 7.9 (H) 2023     Orders:  -     Hemoglobin A1C;  Future  -     Comprehensive metabolic panel; Future  8. Major depressive disorder, recurrent, moderate (HCC)  9. Chronic respiratory failure with hypoxia (HCC)  10. Dilatation of thoracic aorta (HCC)  11. Other primary hyperaldosteronism (HCC)      Falls Plan of Care: balance, strength, and gait training instructions were provided. Home safety education provided.     Patient's shoes and socks removed.    Right Foot/Ankle   Right Foot Inspection  Skin Exam: skin normal. Skin not intact, no dry skin, no warmth, no callus, no erythema, no maceration, no abnormal color, no pre-ulcer, no ulcer and no callus.     Toe Exam: ROM and strength within normal limits. No tenderness    Sensory   Vibration: diminished  Proprioception: diminished  Monofilament testing: diminished    Vascular  Capillary refills: < 3 seconds  The right DP pulse is 2+. The right PT pulse is 2+.     Left Foot/Ankle  Left Foot Inspection  Skin Exam: skin normal. Skin not intact, no dry skin, no warmth, no erythema, no maceration, normal color, no pre-ulcer, no ulcer and no callus.     Toe Exam: ROM and strength within normal limits. No swelling and no tenderness.     Sensory   Vibration: diminished  Proprioception: diminished  Monofilament testing: diminished    Vascular  Capillary refills: < 3 seconds  The left DP pulse is 2+. The left PT pulse is 2+.     Assign Risk Category  No deformity present  No loss of protective sensation  No weak pulses  Risk: 0         History of Present Illness     [id number] for  checkup on multiuple med problems . Pt  with NIDDM, HTN prostate CA, copd, lipids and is doing well.   Pt had labs done and A1C was 5.1  Pt is doing well with his meds and needs  no refills at this point.      Review of Systems   Constitutional:  Negative for activity change, appetite change, chills, fatigue, fever and unexpected weight change.   HENT:  Negative for congestion, ear pain, hearing loss, mouth sores, postnasal drip, sinus pressure, sinus  pain, sneezing and sore throat.    Respiratory:  Negative for apnea, cough, shortness of breath and wheezing.    Cardiovascular:  Negative for chest pain, palpitations and leg swelling.   Gastrointestinal:  Negative for abdominal pain, constipation, diarrhea, nausea and vomiting.   Endocrine: Negative for cold intolerance and heat intolerance.   Genitourinary:  Negative for dysuria, frequency and hematuria.   Musculoskeletal:  Negative for arthralgias, back pain, gait problem, joint swelling and neck pain.   Skin:  Negative for rash.   Neurological:  Negative for dizziness, weakness and numbness.   Hematological:  Does not bruise/bleed easily.   Psychiatric/Behavioral:  Negative for agitation, behavioral problems, confusion, hallucinations and sleep disturbance. The patient is not nervous/anxious.      Past Medical History:   Diagnosis Date   • Cancer ([facility])     PROSTATE CANCER   • Colon polyp    • Hyperlipidemia    • Prostate cancer ([facility]) 05/29/2020   • PTSD (post-traumatic stress disorder)    • Type 2 diabetes mellitus without complication, with long-term current use of insulin ([facility]) 05/29/2020   • Type 2 diabetes mellitus without complication, with long-term current use of insulin ([facility]) 05/29/2020     Past Surgical History:   Procedure Laterality Date   • APPENDECTOMY       Family History   Problem Relation Age of Onset   • Diabetes Mother    • Hypertension Mother    • Multiple sclerosis Sister    • Parkinsonism Sister      Social History     Tobacco Use   • Smoking status: Never   • Smokeless tobacco: Never   Vaping Use   • Vaping status: Never Used   Substance and Sexual Activity   • Alcohol use: Not Currently   • Drug use: Never   • Sexual activity: Not on file     Current Outpatient Medications on File Prior to Visit   Medication Sig   • amLODIPine (NORVASC) 10 mg tablet TAKE 1 TABLET BY MOUTH EVERY DAY   • Artificial Tear Solution (Just Tears Eye Drops) SOLN INSTILL 1 DROP BOTH EYES TWICE A DAY OR AS NEEDED  FOR DRY EYE   • aspirin (ECOTRIN LOW STRENGTH) 81 mg EC tablet Take 81 mg by mouth daily   • carboxymethylcellulose (REFRESH PLUS) 0.5 % SOLN INSTILL 1 DROP BOTH EYES THREE TIMES A DAY AS NEEDED FOR DRY EYES   • clonazePAM (KlonoPIN) 0.5 MG disintegrating tablet Take 1 tablet (0.5 mg total) by mouth 2 (two) times a day   • cloNIDine (CATAPRES) 0.1 mg tablet TAKE 1 TABLET BY MOUTH TWICE A DAY   • Continuous Blood Gluc  (FreeStyle Hugo 2 [place]) SHAI Check blood sugars multiple times per day   • Continuous Blood Gluc Sensor (FreeStyle Hugo 2 Sensor) MISC Check blood sugars multiple times per day   • Continuous Blood Gluc Sensor (FreeStyle Hugo 2 Sensor) MISC Check blood sugars multiple times per day   • Diclofenac Sodium (VOLTAREN) 1 % APPLY 2GM TO UPPER EXTREMITIES TOPICALLY TWICE A DAY **USE DOSING CARD** (DO NOT EXCEED 16 GRAMS DAILY TO ANY AFFECTED JOINT OF THE LOWER EXTREMITIES. DO NOT EXCEED 8 GRAMS DAILY TO ANY AFFECTED JOINT OF THE UPPER EXTREMITIES. DO NOT EXCEED A TOTAL DOSE OF 32 GRAMS DAILY OVER ALL JOINTS)   • gabapentin (Neurontin) 300 mg capsule Take 1 capsule (300 mg total) by mouth 2 (two) times a day   • glucose blood test strip 1 each by Other route daily as needed Use as instructed   • ipratropium-albuterol (DUO-NEB) 0.5-2.5 mg/3 mL nebulizer solution INHALE 1 VIAL IN NEBULIZER TWICE A DAY   • ketotifen (ZADITOR) 0.025 % ophthalmic solution INSTILL 1 DROP BOTH EYES TWICE A DAY FOR ALLERGY   • latanoprost (XALATAN) 0.005 % ophthalmic solution 1 drop daily at bedtime   • lidocaine (LIDODERM) 5 % APPLY UP TO 3 PATCHES TOPICALLY EVERY DAY FOR PAIN (REMOVE PATCH AFTER 12 HOURS; 12 HOURS ON AND 12 HOURS OFF)   • multivitamin (THERAGRAN) TABS Take 1 tablet by mouth daily   • oxyCODONE (ROXICODONE) 10 MG TABS Take 1 tablet (10 mg total) by mouth every 6 (six) hours as needed for moderate pain Max Daily Amount: 40 mg   • PARoxetine (PAXIL) 30 mg tablet TAKE 1 TABLET BY MOUTH EVERY MORNING   •  "semaglutide, 0.25 or 0.5 mg/dose, (Ozempic) 2 mg/3 mL injection pen INJECT 0.5MG SUBCUTANEOUSLY ONCE WEEKLY FOR DIABETES   • spironolactone (ALDACTONE) 25 mg tablet Take 1 tablet (25 mg total) by mouth daily   • traZODone (DESYREL) 50 mg tablet TAKE 1 TABLET BY MOUTH EVERYDAY AT BEDTIME   • cholecalciferol (VITAMIN D3) 400 units tablet Take 400 Units by mouth daily (Patient not taking: Reported on 12/18/2023)   • insulin glargine (LANTUS) 100 units/mL subcutaneous injection Inject 40 Units under the skin daily at bedtime (Patient not taking: Reported on 4/8/2024)   • omeprazole (PriLOSEC) 40 MG capsule Take 1 capsule (40 mg total) by mouth daily for 14 days     Allergies   Allergen Reactions   • Lisinopril Anaphylaxis   • Metoprolol Bradycardia     Immunization History   Administered Date(s) Administered   • COVID-19 MODERNA VACC 0.5 ML IM 07/02/2021, 07/30/2021, 01/25/2022, 07/06/2022   • COVID-19, unspecified 04/28/2021   • DT (pediatric) 05/12/1998   • INFLUENZA 11/06/2020, 11/01/2021, 10/15/2022, 09/15/2023, 10/10/2023   • Influenza Quadrivalent 3 years and older 11/06/2020, 11/01/2021, 10/15/2022   • Influenza, high dose seasonal 0.7 mL 11/16/2020, 11/12/2021, 10/24/2022, 09/15/2023   • Pneumococcal 06/23/2009   • Pneumococcal Conjugate 13-Valent 03/19/2018   • Pneumococcal Polysaccharide PPV23 11/02/2018   • Tdap 11/02/2018   • Tetanus Toxoid, Unspecified 09/01/2007   • Tetanus, adsorbed 09/01/2007     Objective     /70 (BP Location: Left arm, Patient Position: Sitting, Cuff Size: Large)   Pulse 95   Temp 97.7 °F (36.5 °C) (Temporal)   Resp 16   Ht 5' 7\" (1.702 m)   Wt 95.3 kg (210 lb) Comment: patient reports  SpO2 97%   BMI 32.89 kg/m²     Physical Exam  Constitutional:       Appearance: He is well-developed.   HENT:      Head: Normocephalic and atraumatic.      Right Ear: External ear normal.      Left Ear: External ear normal.      Nose: Nose normal.      Mouth/Throat:      Pharynx: Oropharynx " is clear. No oropharyngeal exudate.   Eyes:      General: No scleral icterus.     Conjunctiva/sclera: Conjunctivae normal.      Pupils: Pupils are equal, round, and reactive to light.   Cardiovascular:      Rate and Rhythm: Normal rate and regular rhythm.      Pulses: no weak pulses.           Dorsalis pedis pulses are 2+ on the right side and 2+ on the left side.        Posterior tibial pulses are 2+ on the right side and 2+ on the left side.      Heart sounds: Normal heart sounds.   Pulmonary:      Effort: Pulmonary effort is normal.      Breath sounds: Normal breath sounds. No wheezing or rales.   Abdominal:      General: Bowel sounds are normal.      Palpations: Abdomen is soft.      Tenderness: There is no abdominal tenderness. There is no guarding.   Musculoskeletal:         General: Normal range of motion.      Cervical back: Normal range of motion and neck supple.        Feet:    Feet:      Right foot:      Skin integrity: No ulcer, skin breakdown, erythema, warmth, callus or dry skin.      Left foot:      Skin integrity: No ulcer, skin breakdown, erythema, warmth, callus or dry skin.   Skin:     General: Skin is warm and dry.      Findings: No erythema or rash.   Neurological:      Mental Status: He is alert and oriented to person, place, and time. Mental status is at baseline.   Psychiatric:         Mood and Affect: Mood normal.         Behavior: Behavior normal.         Thought Content: Thought content normal.         Judgment: Judgment normal.

## 2024-07-19 ENCOUNTER — OFFICE VISIT (OUTPATIENT)
Dept: FAMILY MEDICINE CLINIC | Facility: CLINIC | Age: 79
End: 2024-07-19
Payer: COMMERCIAL

## 2024-07-19 VITALS
DIASTOLIC BLOOD PRESSURE: 70 MMHG | HEART RATE: 95 BPM | TEMPERATURE: 97.7 F | OXYGEN SATURATION: 97 % | HEIGHT: 67 IN | SYSTOLIC BLOOD PRESSURE: 116 MMHG | WEIGHT: 210 LBS | BODY MASS INDEX: 32.96 KG/M2 | RESPIRATION RATE: 16 BRPM

## 2024-07-19 DIAGNOSIS — I42.9 CARDIOMYOPATHY, UNSPECIFIED TYPE (HCC): Primary | ICD-10-CM

## 2024-07-19 DIAGNOSIS — J96.11 CHRONIC RESPIRATORY FAILURE WITH HYPOXIA (HCC): ICD-10-CM

## 2024-07-19 DIAGNOSIS — E11.22 TYPE 2 DIABETES MELLITUS WITH STAGE 4 CHRONIC KIDNEY DISEASE, WITH LONG-TERM CURRENT USE OF INSULIN (HCC): ICD-10-CM

## 2024-07-19 DIAGNOSIS — J44.9 CHRONIC OBSTRUCTIVE PULMONARY DISEASE, UNSPECIFIED COPD TYPE (HCC): ICD-10-CM

## 2024-07-19 DIAGNOSIS — E26.09 OTHER PRIMARY HYPERALDOSTERONISM (HCC): ICD-10-CM

## 2024-07-19 DIAGNOSIS — F33.40 RECURRENT MAJOR DEPRESSIVE DISORDER, IN REMISSION (HCC): ICD-10-CM

## 2024-07-19 DIAGNOSIS — I77.810 DILATATION OF THORACIC AORTA (HCC): ICD-10-CM

## 2024-07-19 DIAGNOSIS — Z79.4 TYPE 2 DIABETES MELLITUS WITH STAGE 4 CHRONIC KIDNEY DISEASE, WITH LONG-TERM CURRENT USE OF INSULIN (HCC): ICD-10-CM

## 2024-07-19 DIAGNOSIS — N18.4 TYPE 2 DIABETES MELLITUS WITH STAGE 4 CHRONIC KIDNEY DISEASE, WITH LONG-TERM CURRENT USE OF INSULIN (HCC): ICD-10-CM

## 2024-07-19 DIAGNOSIS — Z85.46 PERSONAL HISTORY OF PROSTATE CANCER: ICD-10-CM

## 2024-07-19 DIAGNOSIS — C34.32 MALIGNANT NEOPLASM OF LOWER LOBE OF LEFT LUNG (HCC): ICD-10-CM

## 2024-07-19 DIAGNOSIS — E78.2 MIXED HYPERLIPIDEMIA: ICD-10-CM

## 2024-07-19 DIAGNOSIS — F33.1 MAJOR DEPRESSIVE DISORDER, RECURRENT, MODERATE (HCC): ICD-10-CM

## 2024-07-19 PROCEDURE — 99214 OFFICE O/P EST MOD 30 MIN: CPT | Performed by: FAMILY MEDICINE

## 2024-07-19 PROCEDURE — G2211 COMPLEX E/M VISIT ADD ON: HCPCS | Performed by: FAMILY MEDICINE

## 2024-07-19 NOTE — PROGRESS NOTES
HCC coding opportunities     , E11.40     Chart Reviewed number of suggestions sent to Provider: 2   GR    Patients Insurance     Medicare Insurance: [employer]vantage

## 2024-07-22 PROBLEM — Z79.4 TYPE 2 DIABETES MELLITUS WITH DIABETIC NEUROPATHY, WITH LONG-TERM CURRENT USE OF INSULIN (HCC): Status: ACTIVE | Noted: 2024-07-22

## 2024-07-22 PROBLEM — Z79.4 TYPE 2 DIABETES MELLITUS WITH DIABETIC NEUROPATHY, WITH LONG-TERM CURRENT USE OF INSULIN (HCC): Status: ACTIVE | Noted: 2020-05-29

## 2024-07-22 PROBLEM — E11.40 TYPE 2 DIABETES MELLITUS WITH DIABETIC NEUROPATHY, WITH LONG-TERM CURRENT USE OF INSULIN (HCC): Status: ACTIVE | Noted: 2020-05-29

## 2024-07-22 PROBLEM — E11.40 TYPE 2 DIABETES MELLITUS WITH DIABETIC NEUROPATHY, WITH LONG-TERM CURRENT USE OF INSULIN (HCC): Status: ACTIVE | Noted: 2024-07-22

## 2024-08-02 DIAGNOSIS — F41.9 ANXIETY: ICD-10-CM

## 2024-08-02 DIAGNOSIS — E11.40 TYPE 2 DIABETES MELLITUS WITH DIABETIC NEUROPATHY, WITHOUT LONG-TERM CURRENT USE OF INSULIN (HCC): ICD-10-CM

## 2024-08-02 RX ORDER — GABAPENTIN 300 MG/1
300 CAPSULE ORAL 2 TIMES DAILY
Qty: 60 CAPSULE | Refills: 5 | OUTPATIENT
Start: 2024-08-02 | End: 2024-09-01

## 2024-08-02 NOTE — TELEPHONE ENCOUNTER
Patient calling stating he needs his clonazepam today because he is completley out of it.        He does not need gabapentin.  Thank you

## 2024-08-02 NOTE — TELEPHONE ENCOUNTER
Pt has one pill left of the Klonopin.    He didn't  his Gabapentin so he thinks the pharmacy put it back on the shelf. He is asking if it can be resent.

## 2024-08-03 ENCOUNTER — NURSE TRIAGE (OUTPATIENT)
Dept: OTHER | Facility: OTHER | Age: 79
End: 2024-08-03

## 2024-08-03 RX ORDER — CLONAZEPAM 0.5 MG/1
0.5 TABLET, ORALLY DISINTEGRATING ORAL 2 TIMES DAILY
Qty: 24 TABLET | Refills: 5 | Status: SHIPPED | OUTPATIENT
Start: 2024-08-03

## 2024-08-03 RX ORDER — CLONAZEPAM 0.5 MG/1
0.5 TABLET, ORALLY DISINTEGRATING ORAL 2 TIMES DAILY
Qty: 30 TABLET | Refills: 0 | OUTPATIENT
Start: 2024-08-03

## 2024-08-03 NOTE — TELEPHONE ENCOUNTER
"Regarding: Panic attack  ----- Message from Zena SAM sent at 8/3/2024 12:14 PM EDT -----  Pt stated, \" I feel like I am about to have a panic attack.\"    "

## 2024-08-03 NOTE — TELEPHONE ENCOUNTER
"Reason for Disposition  • [1] Lightheadedness or dizziness AND [2] persists > 10 minutes AND [3] not relieved by reassurance provided by triager    Answer Assessment - Initial Assessment Questions  1. CONCERN: \"What happened that made you call today?\"      Anxiety/panic.     2. ANXIETY SYMPTOM SCREENING: \"Can you describe how you have been feeling?\"  (e.g., tense, restless, panicky, anxious, keyed up, trouble sleeping, trouble concentrating)      Feels anxious. Feels tense and is sweating. Is sitting in one spot. Feels lightheaded feels SOB.     3. ONSET: \"How long have you been feeling this way?\"      Started this morning around 9am    4. RECURRENT: \"Have you felt this way before?\"  If Yes, ask: \"What happened that time?\" \"What helped these feelings go away in the past?\"       Yes    5. RISK OF HARM - SUICIDAL IDEATION:  \"Do you ever have thoughts of hurting or killing yourself?\"  (e.g., yes, no, no but preoccupation with thoughts about death)    - INTENT:  \"Do you have thoughts of hurting or killing yourself right NOW?\" (e.g., yes, no, N/A)    - PLAN: \"Do you have a specific plan for how you would do this?\" (e.g., gun, knife, overdose, no plan, N/A)      Denies     6. RISK OF HARM - HOMICIDAL IDEATION:  \"Do you ever have thoughts of hurting or killing someone else?\"  (e.g., yes, no, no but preoccupation with thoughts about death)    - INTENT:  \"Do you have thoughts of hurting or killing someone right NOW?\" (e.g., yes, no, N/A)    - PLAN: \"Do you have a specific plan for how you would do this?\" (e.g., gun, knife, no plan, N/A)       Denies    7. FUNCTIONAL IMPAIRMENT: \"How have things been going for you overall? Have you had more difficulty than usual doing your normal daily activities?\"  (e.g., better, same, worse; self-care, school, work, interactions)      Doesn't want to go anywhere alone    8. SUPPORT: \"Who is with you now?\" \"Who do you live with?\" \"Do you have family or friends who you can talk to?\"       " "Wife is with him    9. THERAPIST: \"Do you have a counselor or therapist? Name?\"      Denies    10. STRESSORS: \"Has there been any new stress or recent changes in your life?\"        \"Stressed everyday\" financially      11. CAFFEINE USE: \"Do you drink caffeinated beverages, and how much each day?\" (e.g., coffee, tea, mariana)        No    12. ALCOHOL USE OR SUBSTANCE USE (DRUG USE): \"Do you drink alcohol or use any illegal drugs?\"        Denies    13. OTHER SYMPTOMS: \"Do you have any other physical symptoms right now?\" (e.g., chest pain, palpitations, difficulty breathing, fever)    Protocols used: Anxiety and Panic Attack-ADULT-AH    "

## 2024-08-07 NOTE — TELEPHONE ENCOUNTER
Follow up phone call to pt to schedule an appointment. Pt stated that he will follow up with provider at next scheduled appointment.

## 2024-09-19 PROBLEM — F33.1 MAJOR DEPRESSIVE DISORDER, RECURRENT, MODERATE (HCC): Status: RESOLVED | Noted: 2024-07-19 | Resolved: 2024-09-19

## 2024-09-20 ENCOUNTER — APPOINTMENT (OUTPATIENT)
Age: 79
End: 2024-09-20
Payer: COMMERCIAL

## 2024-09-20 ENCOUNTER — OFFICE VISIT (OUTPATIENT)
Dept: FAMILY MEDICINE CLINIC | Facility: CLINIC | Age: 79
End: 2024-09-20
Payer: COMMERCIAL

## 2024-09-20 VITALS
BODY MASS INDEX: 34.06 KG/M2 | WEIGHT: 217 LBS | SYSTOLIC BLOOD PRESSURE: 126 MMHG | HEART RATE: 95 BPM | OXYGEN SATURATION: 98 % | HEIGHT: 67 IN | TEMPERATURE: 97.5 F | DIASTOLIC BLOOD PRESSURE: 74 MMHG

## 2024-09-20 DIAGNOSIS — N18.4 STAGE 4 CHRONIC KIDNEY DISEASE (HCC): ICD-10-CM

## 2024-09-20 DIAGNOSIS — Z23 ENCOUNTER FOR IMMUNIZATION: ICD-10-CM

## 2024-09-20 DIAGNOSIS — I12.9 HYPERTENSIVE RENAL DISEASE, STAGE 1 THROUGH STAGE 4 OR UNSPECIFIED CHRONIC KIDNEY DISEASE: ICD-10-CM

## 2024-09-20 DIAGNOSIS — N18.4 TYPE 2 DIABETES MELLITUS WITH STAGE 4 CHRONIC KIDNEY DISEASE, WITH LONG-TERM CURRENT USE OF INSULIN (HCC): ICD-10-CM

## 2024-09-20 DIAGNOSIS — E11.22 TYPE 2 DIABETES MELLITUS WITH STAGE 4 CHRONIC KIDNEY DISEASE, WITH LONG-TERM CURRENT USE OF INSULIN (HCC): ICD-10-CM

## 2024-09-20 DIAGNOSIS — C61 PROSTATE CANCER (HCC): ICD-10-CM

## 2024-09-20 DIAGNOSIS — Z79.4 TYPE 2 DIABETES MELLITUS WITH DIABETIC NEUROPATHY, WITH LONG-TERM CURRENT USE OF INSULIN (HCC): ICD-10-CM

## 2024-09-20 DIAGNOSIS — E78.2 MIXED HYPERLIPIDEMIA: ICD-10-CM

## 2024-09-20 DIAGNOSIS — E11.40 TYPE 2 DIABETES MELLITUS WITH DIABETIC NEUROPATHY, WITH LONG-TERM CURRENT USE OF INSULIN (HCC): ICD-10-CM

## 2024-09-20 DIAGNOSIS — Z79.4 TYPE 2 DIABETES MELLITUS WITH STAGE 4 CHRONIC KIDNEY DISEASE, WITH LONG-TERM CURRENT USE OF INSULIN (HCC): ICD-10-CM

## 2024-09-20 DIAGNOSIS — F33.40 RECURRENT MAJOR DEPRESSIVE DISORDER, IN REMISSION (HCC): ICD-10-CM

## 2024-09-20 DIAGNOSIS — Z00.00 WELL ADULT EXAM: Primary | ICD-10-CM

## 2024-09-20 LAB
ALBUMIN SERPL BCG-MCNC: 4.1 G/DL (ref 3.5–5)
ALP SERPL-CCNC: 46 U/L (ref 34–104)
ALT SERPL W P-5'-P-CCNC: 15 U/L (ref 7–52)
ANION GAP SERPL CALCULATED.3IONS-SCNC: 7 MMOL/L (ref 4–13)
AST SERPL W P-5'-P-CCNC: 13 U/L (ref 13–39)
BILIRUB SERPL-MCNC: 0.63 MG/DL (ref 0.2–1)
BUN SERPL-MCNC: 28 MG/DL (ref 5–25)
CALCIUM SERPL-MCNC: 9.1 MG/DL (ref 8.4–10.2)
CHLORIDE SERPL-SCNC: 105 MMOL/L (ref 96–108)
CO2 SERPL-SCNC: 28 MMOL/L (ref 21–32)
CREAT SERPL-MCNC: 2.23 MG/DL (ref 0.6–1.3)
EST. AVERAGE GLUCOSE BLD GHB EST-MCNC: 94 MG/DL
GFR SERPL CREATININE-BSD FRML MDRD: 27 ML/MIN/1.73SQ M
GLUCOSE P FAST SERPL-MCNC: 119 MG/DL (ref 65–99)
HBA1C MFR BLD: 4.9 %
POTASSIUM SERPL-SCNC: 4.6 MMOL/L (ref 3.5–5.3)
PROT SERPL-MCNC: 6.6 G/DL (ref 6.4–8.4)
SODIUM SERPL-SCNC: 140 MMOL/L (ref 135–147)

## 2024-09-20 PROCEDURE — G0439 PPPS, SUBSEQ VISIT: HCPCS | Performed by: FAMILY MEDICINE

## 2024-09-20 PROCEDURE — 99214 OFFICE O/P EST MOD 30 MIN: CPT | Performed by: FAMILY MEDICINE

## 2024-09-20 PROCEDURE — 80053 COMPREHEN METABOLIC PANEL: CPT

## 2024-09-20 PROCEDURE — 36415 COLL VENOUS BLD VENIPUNCTURE: CPT

## 2024-09-20 PROCEDURE — 83036 HEMOGLOBIN GLYCOSYLATED A1C: CPT

## 2024-09-20 PROCEDURE — G0008 ADMIN INFLUENZA VIRUS VAC: HCPCS | Performed by: FAMILY MEDICINE

## 2024-09-20 PROCEDURE — 90662 IIV NO PRSV INCREASED AG IM: CPT | Performed by: FAMILY MEDICINE

## 2024-09-20 NOTE — PATIENT INSTRUCTIONS
Medicare Preventive Visit Patient Instructions  Thank you for completing your Welcome to Medicare Visit or Medicare Annual Wellness Visit today. Your next wellness visit will be due in one year (9/21/2025).  The screening/preventive services that you may require over the next 5-10 years are detailed below. Some tests may not apply to you based off risk factors and/or age. Screening tests ordered at today's visit but not completed yet may show as past due. Also, please note that scanned in results may not display below.  Preventive Screenings:  Service Recommendations Previous Testing/Comments   Colorectal Cancer Screening  Colonoscopy    Fecal Occult Blood Test (FOBT)/Fecal Immunochemical Test (FIT)  Fecal DNA/Cologuard Test  Flexible Sigmoidoscopy Age: 45-75 years old   Colonoscopy: every 10 years (May be performed more frequently if at higher risk)  OR  FOBT/FIT: every 1 year  OR  Cologuard: every 3 years  OR  Sigmoidoscopy: every 5 years  Screening may be recommended earlier than age 45 if at higher risk for colorectal cancer. Also, an individualized decision between you and your healthcare provider will decide whether screening between the ages of 76-85 would be appropriate. Colonoscopy: 01/09/2020  FOBT/FIT: Not on file  Cologuard: Not on file  Sigmoidoscopy: Not on file    Screening Current     Prostate Cancer Screening Individualized decision between patient and health care provider in men between ages of 55-69   Medicare will cover every 12 months beginning on the day after your 50th birthday PSA: No results in last 5 years     History Prostate Cancer  Screening Not Indicated     Hepatitis C Screening Once for adults born between 1945 and 1965  More frequently in patients at high risk for Hepatitis C Hep C Antibody: 11/12/2021    Screening Current   Diabetes Screening 1-2 times per year if you're at risk for diabetes or have pre-diabetes Fasting glucose: 108 mg/dL (4/6/2024)  A1C: 5.1 %  (7/18/2024)  Screening Not Indicated  History Diabetes   Cholesterol Screening Once every 5 years if you don't have a lipid disorder. May order more often based on risk factors. Lipid panel: 04/06/2024  Screening Not Indicated  History Lipid Disorder      Other Preventive Screenings Covered by Medicare:  Abdominal Aortic Aneurysm (AAA) Screening: covered once if your at risk. You're considered to be at risk if you have a family history of AAA or a male between the age of 65-75 who smoking at least 100 cigarettes in your lifetime.  Lung Cancer Screening: covers low dose CT scan once per year if you meet all of the following conditions: (1) Age 55-77; (2) No signs or symptoms of lung cancer; (3) Current smoker or have quit smoking within the last 15 years; (4) You have a tobacco smoking history of at least 20 pack years (packs per day x number of years you smoked); (5) You get a written order from a healthcare provider.  Glaucoma Screening: covered annually if you're considered high risk: (1) You have diabetes OR (2) Family history of glaucoma OR (3)  aged 50 and older OR (4)  American aged 65 and older  Osteoporosis Screening: covered every 2 years if you meet one of the following conditions: (1) Have a vertebral abnormality; (2) On glucocorticoid therapy for more than 3 months; (3) Have primary hyperparathyroidism; (4) On osteoporosis medications and need to assess response to drug therapy.  HIV Screening: covered annually if you're between the age of 15-65. Also covered annually if you are younger than 15 and older than 65 with risk factors for HIV infection. For pregnant patients, it is covered up to 3 times per pregnancy.    Immunizations:  Immunization Recommendations   Influenza Vaccine Annual influenza vaccination during flu season is recommended for all persons aged >= 6 months who do not have contraindications   Pneumococcal Vaccine   * Pneumococcal conjugate vaccine = PCV13 (Prevnar  13), PCV15 (Vaxneuvance), PCV20 (Prevnar 20)  * Pneumococcal polysaccharide vaccine = PPSV23 (Pneumovax) Adults 19-65 yo with certain risk factors or if 65+ yo  If never received any pneumonia vaccine: recommend Prevnar 20 (PCV20)  Give PCV20 if previously received 1 dose of PCV13 or PPSV23   Hepatitis B Vaccine 3 dose series if at intermediate or high risk (ex: diabetes, end stage renal disease, liver disease)   Respiratory syncytial virus (RSV) Vaccine - COVERED BY MEDICARE PART D  * RSVPreF3 (Arexvy) CDC recommends that adults 60 years of age and older may receive a single dose of RSV vaccine using shared clinical decision-making (SCDM)   Tetanus (Td) Vaccine - COST NOT COVERED BY MEDICARE PART B Following completion of primary series, a booster dose should be given every 10 years to maintain immunity against tetanus. Td may also be given as tetanus wound prophylaxis.   Tdap Vaccine - COST NOT COVERED BY MEDICARE PART B Recommended at least once for all adults. For pregnant patients, recommended with each pregnancy.   Shingles Vaccine (Shingrix) - COST NOT COVERED BY MEDICARE PART B  2 shot series recommended in those 19 years and older who have or will have weakened immune systems or those 50 years and older     Health Maintenance Due:      Topic Date Due   • Colorectal Cancer Screening  01/09/2025   • Hepatitis C Screening  Completed     Immunizations Due:      Topic Date Due   • COVID-19 Vaccine (6 - 2023-24 season) 09/01/2024   • Influenza Vaccine (1) 09/01/2024     Advance Directives   What are advance directives?  Advance directives are legal documents that state your wishes and plans for medical care. These plans are made ahead of time in case you lose your ability to make decisions for yourself. Advance directives can apply to any medical decision, such as the treatments you want, and if you want to donate organs.   What are the types of advance directives?  There are many types of advance directives, and  each state has rules about how to use them. You may choose a combination of any of the following:  Living will:  This is a written record of the treatment you want. You can also choose which treatments you do not want, which to limit, and which to stop at a certain time. This includes surgery, medicine, IV fluid, and tube feedings.   Durable power of  for healthcare (DPAHC):  This is a written record that states who you want to make healthcare choices for you when you are unable to make them for yourself. This person, called a proxy, is usually a family member or a friend. You may choose more than 1 proxy.  Do not resuscitate (DNR) order:  A DNR order is used in case your heart stops beating or you stop breathing. It is a request not to have certain forms of treatment, such as CPR. A DNR order may be included in other types of advance directives.  Medical directive:  This covers the care that you want if you are in a coma, near death, or unable to make decisions for yourself. You can list the treatments you want for each condition. Treatment may include pain medicine, surgery, blood transfusions, dialysis, IV or tube feedings, and a ventilator (breathing machine).  Values history:  This document has questions about your views, beliefs, and how you feel and think about life. This information can help others choose the care that you would choose.  Why are advance directives important?  An advance directive helps you control your care. Although spoken wishes may be used, it is better to have your wishes written down. Spoken wishes can be misunderstood, or not followed. Treatments may be given even if you do not want them. An advance directive may make it easier for your family to make difficult choices about your care.   Weight Management   Why it is important to manage your weight:  Being overweight increases your risk of health conditions such as heart disease, high blood pressure, type 2 diabetes, and  certain types of cancer. It can also increase your risk for osteoarthritis, sleep apnea, and other respiratory problems. Aim for a slow, steady weight loss. Even a small amount of weight loss can lower your risk of health problems.  How to lose weight safely:  A safe and healthy way to lose weight is to eat fewer calories and get regular exercise. You can lose up about 1 pound a week by decreasing the number of calories you eat by 500 calories each day.   Healthy meal plan for weight management:  A healthy meal plan includes a variety of foods, contains fewer calories, and helps you stay healthy. A healthy meal plan includes the following:  Eat whole-grain foods more often.  A healthy meal plan should contain fiber. Fiber is the part of grains, fruits, and vegetables that is not broken down by your body. Whole-grain foods are healthy and provide extra fiber in your diet. Some examples of whole-grain foods are whole-wheat breads and pastas, oatmeal, brown rice, and bulgur.  Eat a variety of vegetables every day.  Include dark, leafy greens such as spinach, kale, siva greens, and mustard greens. Eat yellow and orange vegetables such as carrots, sweet potatoes, and winter squash.   Eat a variety of fruits every day.  Choose fresh or canned fruit (canned in its own juice or light syrup) instead of juice. Fruit juice has very little or no fiber.  Eat low-fat dairy foods.  Drink fat-free (skim) milk or 1% milk. Eat fat-free yogurt and low-fat cottage cheese. Try low-fat cheeses such as mozzarella and other reduced-fat cheeses.  Choose meat and other protein foods that are low in fat.  Choose beans or other legumes such as split peas or lentils. Choose fish, skinless poultry (chicken or turkey), or lean cuts of red meat (beef or pork). Before you cook meat or poultry, cut off any visible fat.   Use less fat and oil.  Try baking foods instead of frying them. Add less fat, such as margarine, sour cream, regular salad  dressing and mayonnaise to foods. Eat fewer high-fat foods. Some examples of high-fat foods include french fries, doughnuts, ice cream, and cakes.  Eat fewer sweets.  Limit foods and drinks that are high in sugar. This includes candy, cookies, regular soda, and sweetened drinks.  Exercise:  Exercise at least 30 minutes per day on most days of the week. Some examples of exercise include walking, biking, dancing, and swimming. You can also fit in more physical activity by taking the stairs instead of the elevator or parking farther away from stores. Ask your healthcare provider about the best exercise plan for you.   Narcotic (Opioid) Safety    Use narcotics safely:  Take prescribed narcotics exactly as directed  Do not give narcotics to others or take narcotics that belong to someone else  Do not mix narcotics without medicines or alcohol  Do not drive or operate heavy machinery after you take the narcotic  Monitor for side effects and notify your healthcare provider if you experienced side effects such as nausea, sleepiness, itching, or trouble thinking clearly.    Manage constipation:    Constipation is the most common side effect of narcotic medicine. Constipation is when you have hard, dry bowel movements, or you go longer than usual between bowel movements. Tell your healthcare provider about all changes in your bowel movements while you are taking narcotics. He or she may recommend laxative medicine to help you have a bowel movement. He or she may also change the kind of narcotic you are taking, or change when you take it. The following are more ways you can prevent or relieve constipation:    Drink liquids as directed.  You may need to drink extra liquids to help soften and move your bowels. Ask how much liquid to drink each day and which liquids are best for you.  Eat high-fiber foods.  This may help decrease constipation by adding bulk to your bowel movements. High-fiber foods include fruits, vegetables,  whole-grain breads and cereals, and beans. Your healthcare provider or dietitian can help you create a high-fiber meal plan. Your provider may also recommend a fiber supplement if you cannot get enough fiber from food.  Exercise regularly.  Regular physical activity can help stimulate your intestines. Walking is a good exercise to prevent or relieve constipation. Ask which exercises are best for you.  Schedule a time each day to have a bowel movement.  This may help train your body to have regular bowel movements. Bend forward while you are on the toilet to help move the bowel movement out. Sit on the toilet for at least 10 minutes, even if you do not have a bowel movement.    Store narcotics safely:   Store narcotics where others cannot easily get them.  Keep them in a locked cabinet or secure area. Do not  keep them in a purse or other bag you carry with you. A person may be looking for something else and find the narcotics.  Make sure narcotics are stored out of the reach of children.  A child can easily overdose on narcotics. Narcotics may look like candy to a small child.    The best way to dispose of narcotics:      The laws vary by country and area. In the [place], the best way is to return the narcotics through a take-back program. This program is offered by the [place] Drug Enforcement Agency (GENE). The following are options for using the program:  Take the narcotics to a GENE collection site.  The site is often a law enforcement center. Call your local law enforcement center for scheduled take-back days in your area. You will be given information on where to go if the collection site is in a different location.  Take the narcotics to an approved pharmacy or hospital.  A pharmacy or hospital may be set up as a collection site. You will need to ask if it is a GENE collection site if you were not directed there. A pharmacy or doctor's office may not be able to take back narcotics unless it is a GENE site.  Use  a mail-back system.  This means you are given containers to put the narcotics into. You will then mail them in the containers.  Use a take-back drop box.  This is a place to leave the narcotics at any time. People and animals will not be able to get into the box. Your local law enforcement agency can tell you where to find a drop box in your area.    Other ways to manage pain:   Ask your healthcare provider about non-narcotic medicines to control pain.  Nonprescription medicines include NSAIDs (such as ibuprofen) and acetaminophen. Prescription medicines include muscle relaxers, antidepressants, and steroids.  Pain may be managed without any medicines.  Some ways to relieve pain include massage, aromatherapy, or meditation. Physical or occupational therapy may also help.    For more information:   Drug Enforcement Administration  [address] [place] [id number]  Phone: 1- 628 - 118-4928  Web Address: https://www.deadiversion.[facility].com.gov/drug_disposal/     Food and Drug Administration  [address] [place] [address]  Phone: 7- 251 - 490-0296  Web Address: http://www.fda.gov     © Copyright [employer] 2018 Information is for End User's use only and may not be sold, redistributed or otherwise used for commercial purposes. All illustrations and images included in CareNotes® are the copyrighted property of A.D.A.M., Inc. or [employer]

## 2024-09-20 NOTE — PROGRESS NOTES
Ambulatory Visit  Name: Adam Stone      : 1945      MRN: [id number]  Encounter Provider: Marco Antonio Grove MD  Encounter Date: 2024   Encounter department: [facility]    Assessment & Plan       Preventive health issues were discussed with patient, and age appropriate screening tests were ordered as noted in patient's After Visit Summary. Personalized health advice and appropriate referrals for health education or preventive services given if needed, as noted in patient's After Visit Summary.    History of Present Illness     HPI   Patient Care Team:  Marco Antonio Grove MD as PCP - General (Family Medicine)  Marco Antonio Grove MD as PCP - [facility]CP-[facility] ([facility])  Marco Antonio Grove MD as PCP - PCP-[facility] ([facility])    Review of Systems  Medical History Reviewed by provider this encounter:       Annual Wellness Visit Questionnaire       Health Risk Assessment:   Patient rates overall health as good. Patient feels that their physical health rating is slightly worse. Patient is satisfied with their life. Eyesight was rated as much worse. Hearing was rated as much worse. Patient feels that their emotional and mental health rating is slightly worse. Patients states they are always angry. Patient states they are often unusually tired/fatigued. Pain experienced in the last 7 days has been a lot. Patient's pain rating has been 8/10. Patient states that he has experienced no weight loss or gain in last 6 months.     Depression Screening:   PHQ-9 Score: 11      Fall Risk Screening:   In the past year, patient has experienced: no history of falling in past year      Home Safety:  Patient does not have trouble with stairs inside or outside of their home. Patient has working smoke alarms and has no working carbon monoxide detector. Home safety hazards include: none.     Nutrition:   Current diet is Regular and No Added Salt.     Medications:   Patient is not currently taking any  over-the-counter supplements. Patient is able to manage medications.     Activities of Daily Living (ADLs)/Instrumental Activities of Daily Living (IADLs):   Walk and transfer into and out of bed and chair?: Yes  Dress and groom yourself?: Yes    Bathe or shower yourself?: Yes    Feed yourself? Yes  Do your laundry/housekeeping?: Yes  Manage your money, pay your bills and track your expenses?: Yes  Make your own meals?: Yes    Do your own shopping?: Yes    Previous Hospitalizations:   Any hospitalizations or ED visits within the last 12 months?: No      Advance Care Planning:   Living will: No    Durable POA for healthcare: No      PREVENTIVE SCREENINGS      Cardiovascular Screening:    General: Screening Not Indicated and History Lipid Disorder      Diabetes Screening:     General: Screening Not Indicated and History Diabetes      Colorectal Cancer Screening:     General: Screening Current      Prostate Cancer Screening:    General: History Prostate Cancer and Screening Not Indicated      Lung Cancer Screening:     General: Screening Not Indicated and History Lung Cancer      Hepatitis C Screening:    General: Screening Current    Screening, Brief Intervention, and Referral to Treatment (SBIRT)    Screening      AUDIT-C Screenin) How often did you have a drink containing alcohol in the past year? never  2) How many drinks did you have on a typical day when you were drinking in the past year? 0  3) How often did you have 6 or more drinks on one occasion in the past year? never    AUDIT-C Score: 0  Interpretation: Score 0-3 (male): Negative screen for alcohol misuse    Single Item Drug Screening:  How often have you used an illegal drug (including marijuana) or a prescription medication for non-medical reasons in the past year? never    Single Item Drug Screen Score: 0  Interpretation: Negative screen for possible drug use disorder    Social Determinants of Health     Financial Resource Strain: High Risk  "(10/20/2023)    Received from [facility]    Financial Resource Strain     Do you have any trouble paying for your medications, or do you think you might in the future?: Yes   Food Insecurity: No Food Insecurity (10/20/2023)    Received from [facility]    Food Insecurity     Do you need food for this week?: No   Transportation Needs: No Transportation Needs (10/20/2023)    Received from [facility]    Transportation Needs     READ ONLY Do you have trouble getting a ride to medical visits or work?: Never True   Housing Stability: Low Risk  (10/20/2023)    Received from [facility]    Housing Stability     Do you currently live in a shelter or have no steady place to sleep at night?: No     READ ONLY Do you think you are at risk of becoming homeless?: No   Utilities: At Risk (10/20/2023)    Received from [facility]    Utilities     Do you have trouble paying your heating, water, or electric bill?: Yes     No results found.    Objective     Pulse 95   Temp 97.5 °F (36.4 °C) (Skin)   Ht 5' 7\" (1.702 m)   Wt 98.4 kg (217 lb)   SpO2 98%   BMI 33.99 kg/m²     Physical Exam    "

## 2024-09-20 NOTE — ASSESSMENT & PLAN NOTE
Lab Results   Component Value Date    EGFR 23 07/18/2024    EGFR 19 04/06/2024    EGFR 15 11/13/2023    CREATININE 2.52 (H) 07/18/2024    CREATININE 3.01 (H) 04/06/2024    CREATININE 3.58 (H) 11/13/2023   Pt to avoid NSAIDs and any IV dyes. Patient to follow up eoither with nephrology or  with us for  further  monitoring of  renal function.

## 2024-09-20 NOTE — PROGRESS NOTES
Ambulatory Visit  Name: Adam Stone      : 1945      MRN: [id number]  Encounter Provider: Marco Antonio Grove MD  Encounter Date: 2024   Encounter department: [facility]    Assessment & Plan  Well adult exam         Recurrent major depressive disorder, in remission (HCC)  Patient to continue utilizing medical therapy as well and counseling sources as applicable for condition. If  suicidal thought or fear of suicide to contact 911 and seek help immediately. Meds reviewed and patient questions answered today          Mixed hyperlipidemia         Hypertensive renal disease, stage 1 through stage 4 or unspecified chronic kidney disease  Lab Results   Component Value Date    EGFR 23 2024    EGFR 19 2024    EGFR 15 2023    CREATININE 2.52 (H) 2024    CREATININE 3.01 (H) 2024    CREATININE 3.58 (H) 2023   Pt to avoid NSAIDs and any IV dyes. Patient to follow up eoither with nephrology or  with us for  further  monitoring of  renal function.          Prostate cancer (HCC)         Stage 4 chronic kidney disease (HCC)  Lab Results   Component Value Date    EGFR 23 2024    EGFR 19 2024    EGFR 15 2023    CREATININE 2.52 (H) 2024    CREATININE 3.01 (H) 2024    CREATININE 3.58 (H) 2023   Pt to avoid NSAIDs and any IV dyes. Patient to follow up eoither with nephrology or  with us for  further  monitoring of  renal function.          Type 2 diabetes mellitus with stage 4 chronic kidney disease, with long-term current use of insulin (HCC)    Lab Results   Component Value Date    HGBA1C 5.1 2024            Type 2 diabetes mellitus with diabetic neuropathy, with long-term current use of insulin ([facility])  Patient is stable with current meds and discussed a low carb diet. Pt  recommended to see eye doctor and foot doctor for routine screening as per protocol. Recheck A1C  and Cr in 3 months. Patient questions answered  today about this condtion.   Lab Results   Component Value Date    HGBA1C 5.1 07/18/2024            Encounter for immunization    Orders:    influenza vaccine, high-dose, PF 0.5 mL (Fluzone High Dose)         History of Present Illness     HPI  Review of Systems   Constitutional:  Negative for activity change, appetite change, chills, fatigue, fever and unexpected weight change.   HENT:  Negative for congestion, ear pain, hearing loss, mouth sores, postnasal drip, sinus pressure, sinus pain, sneezing and sore throat.    Respiratory:  Negative for apnea, cough, shortness of breath and wheezing.    Cardiovascular:  Negative for chest pain, palpitations and leg swelling.   Gastrointestinal:  Negative for abdominal pain, constipation, diarrhea, nausea and vomiting.   Endocrine: Negative for cold intolerance and heat intolerance.   Genitourinary:  Negative for dysuria, frequency and hematuria.   Musculoskeletal:  Negative for arthralgias, back pain, gait problem, joint swelling and neck pain.   Skin:  Negative for rash.   Neurological:  Negative for dizziness, weakness and numbness.   Hematological:  Does not bruise/bleed easily.   Psychiatric/Behavioral:  Negative for agitation, behavioral problems, confusion, hallucinations and sleep disturbance. The patient is not nervous/anxious.      Past Medical History:   Diagnosis Date    Cancer (HCC)     PROSTATE CANCER    Colon polyp     Hyperlipidemia     Prostate cancer (HCC) 05/29/2020    PTSD (post-traumatic stress disorder)     Type 2 diabetes mellitus without complication, with long-term current use of insulin ([facility]) 05/29/2020    Type 2 diabetes mellitus without complication, with long-term current use of insulin ([facility]) 05/29/2020     Past Surgical History:   Procedure Laterality Date    APPENDECTOMY       Family History   Problem Relation Age of Onset    Diabetes Mother     Hypertension Mother     Multiple sclerosis Sister     Parkinsonism Sister      Social History      Tobacco Use    Smoking status: Never    Smokeless tobacco: Never   Vaping Use    Vaping status: Never Used   Substance and Sexual Activity    Alcohol use: Not Currently    Drug use: Never    Sexual activity: Not on file     Current Outpatient Medications on File Prior to Visit   Medication Sig    amLODIPine (NORVASC) 10 mg tablet TAKE 1 TABLET BY MOUTH EVERY DAY    Artificial Tear Solution (Just Tears Eye Drops) SOLN INSTILL 1 DROP BOTH EYES TWICE A DAY OR AS NEEDED FOR DRY EYE    aspirin (ECOTRIN LOW STRENGTH) 81 mg EC tablet Take 81 mg by mouth daily    carboxymethylcellulose (REFRESH PLUS) 0.5 % SOLN INSTILL 1 DROP BOTH EYES THREE TIMES A DAY AS NEEDED FOR DRY EYES    clonazePAM (KlonoPIN) 0.5 MG disintegrating tablet TAKE 1 TABLET BY MOUTH 2 TIMES A DAY.    cloNIDine (CATAPRES) 0.1 mg tablet TAKE 1 TABLET BY MOUTH TWICE A DAY    Continuous Blood Gluc  (FreeStyle Hugo 2 Darfur) SHAI Check blood sugars multiple times per day    Continuous Blood Gluc Sensor (FreeStyle Hugo 2 Sensor) MISC Check blood sugars multiple times per day    Continuous Blood Gluc Sensor (FreeStyle Hugo 2 Sensor) MISC Check blood sugars multiple times per day    Diclofenac Sodium (VOLTAREN) 1 % APPLY 2GM TO UPPER EXTREMITIES TOPICALLY TWICE A DAY **USE DOSING CARD** (DO NOT EXCEED 16 GRAMS DAILY TO ANY AFFECTED JOINT OF THE LOWER EXTREMITIES. DO NOT EXCEED 8 GRAMS DAILY TO ANY AFFECTED JOINT OF THE UPPER EXTREMITIES. DO NOT EXCEED A TOTAL DOSE OF 32 GRAMS DAILY OVER ALL JOINTS)    gabapentin (Neurontin) 300 mg capsule Take 1 capsule (300 mg total) by mouth 2 (two) times a day    glucose blood test strip 1 each by Other route daily as needed Use as instructed    ipratropium-albuterol (DUO-NEB) 0.5-2.5 mg/3 mL nebulizer solution INHALE 1 VIAL IN NEBULIZER TWICE A DAY    latanoprost (XALATAN) 0.005 % ophthalmic solution 1 drop daily at bedtime    lidocaine (LIDODERM) 5 % APPLY UP TO 3 PATCHES TOPICALLY EVERY DAY FOR PAIN (REMOVE  PATCH AFTER 12 HOURS; 12 HOURS ON AND 12 HOURS OFF)    multivitamin (THERAGRAN) TABS Take 1 tablet by mouth daily    oxyCODONE (ROXICODONE) 10 MG TABS Take 1 tablet (10 mg total) by mouth every 6 (six) hours as needed for moderate pain Max Daily Amount: 40 mg    PARoxetine (PAXIL) 30 mg tablet TAKE 1 TABLET BY MOUTH EVERY MORNING    semaglutide, 0.25 or 0.5 mg/dose, (Ozempic) 2 mg/3 mL injection pen INJECT 0.5MG SUBCUTANEOUSLY ONCE WEEKLY FOR DIABETES    traZODone (DESYREL) 50 mg tablet TAKE 1 TABLET BY MOUTH EVERYDAY AT BEDTIME    cholecalciferol (VITAMIN D3) 400 units tablet Take 400 Units by mouth daily (Patient not taking: Reported on 9/20/2024)    insulin glargine (LANTUS) 100 units/mL subcutaneous injection Inject 40 Units under the skin daily at bedtime (Patient not taking: Reported on 4/8/2024)    ketotifen (ZADITOR) 0.025 % ophthalmic solution INSTILL 1 DROP BOTH EYES TWICE A DAY FOR ALLERGY (Patient not taking: Reported on 9/20/2024)    omeprazole (PriLOSEC) 40 MG capsule Take 1 capsule (40 mg total) by mouth daily for 14 days (Patient not taking: Reported on 9/20/2024)    spironolactone (ALDACTONE) 25 mg tablet Take 1 tablet (25 mg total) by mouth daily (Patient not taking: Reported on 9/20/2024)     Allergies   Allergen Reactions    Lisinopril Anaphylaxis    Metoprolol Bradycardia     Immunization History   Administered Date(s) Administered    COVID-19 MODERNA VACC 0.5 ML IM 07/02/2021, 07/30/2021, 01/25/2022, 07/06/2022    COVID-19, unspecified 04/28/2021    DT (pediatric) 05/12/1998    INFLUENZA 11/06/2020, 11/01/2021, 10/15/2022, 09/15/2023, 10/10/2023    Influenza Quadrivalent 3 years and older 11/06/2020, 11/01/2021, 10/15/2022    Influenza, high dose seasonal 0.7 mL 11/16/2020, 11/12/2021, 10/24/2022, 09/15/2023    Pneumococcal 06/23/2009    Pneumococcal Conjugate 13-Valent 03/19/2018    Pneumococcal Polysaccharide PPV23 11/02/2018    Tdap 11/02/2018    Tetanus Toxoid, Unspecified 09/01/2007     "Tetanus, adsorbed 09/01/2007     Objective     /74 (BP Location: Left arm, Patient Position: Sitting, Cuff Size: Large)   Pulse 95   Temp 97.5 °F (36.4 °C) (Skin)   Ht 5' 7\" (1.702 m)   Wt 98.4 kg (217 lb)   SpO2 98%   BMI 33.99 kg/m²     Physical Exam    "

## 2024-12-09 ENCOUNTER — PREP FOR PROCEDURE (OUTPATIENT)
Dept: GASTROENTEROLOGY | Facility: CLINIC | Age: 79
End: 2024-12-09

## 2024-12-09 ENCOUNTER — TELEPHONE (OUTPATIENT)
Dept: GASTROENTEROLOGY | Facility: CLINIC | Age: 79
End: 2024-12-09

## 2024-12-09 DIAGNOSIS — Z86.0100 HISTORY OF COLON POLYPS: Primary | ICD-10-CM

## 2024-12-09 NOTE — TELEPHONE ENCOUNTER
Called patient and scheduled his recall Colon  Reviewed and Mailed prep and diabetic Instructions  Ozempic 7 day HOLD  Scheduled date of colonoscopy (as of today):1/9/25  Physician performing colonoscopy:Arik  Location of colonoscopy:[facility]  Bowel prep reviewed with patient:Niels/Chaz  Instructions reviewed with patient by:Edson phillips  Clearances: none

## 2024-12-10 DIAGNOSIS — I10 ESSENTIAL HYPERTENSION: ICD-10-CM

## 2024-12-10 RX ORDER — AMLODIPINE BESYLATE 10 MG/1
10 TABLET ORAL DAILY
Qty: 90 TABLET | Refills: 1 | Status: SHIPPED | OUTPATIENT
Start: 2024-12-10

## 2024-12-10 NOTE — TELEPHONE ENCOUNTER
Pt requested a refill for the amLODIPine (NORVASC) 10 mg tablet. Please send to:     [facility]/pharmacy #[id number] - [facility] [address] - [address]       Pt is out of med. Thank you for your kind assistance.

## 2025-01-07 DIAGNOSIS — F32.A DEPRESSION, UNSPECIFIED DEPRESSION TYPE: ICD-10-CM

## 2025-01-07 RX ORDER — PAROXETINE 30 MG/1
30 TABLET, FILM COATED ORAL EVERY MORNING
Qty: 90 TABLET | Refills: 1 | Status: SHIPPED | OUTPATIENT
Start: 2025-01-07

## 2025-01-09 ENCOUNTER — ANESTHESIA (OUTPATIENT)
Dept: GASTROENTEROLOGY | Facility: HOSPITAL | Age: 80
End: 2025-01-09
Payer: COMMERCIAL

## 2025-01-09 ENCOUNTER — HOSPITAL ENCOUNTER (OUTPATIENT)
Dept: GASTROENTEROLOGY | Facility: HOSPITAL | Age: 80
Setting detail: OUTPATIENT SURGERY
End: 2025-01-09
Attending: INTERNAL MEDICINE
Payer: COMMERCIAL

## 2025-01-09 ENCOUNTER — ANESTHESIA EVENT (OUTPATIENT)
Dept: GASTROENTEROLOGY | Facility: HOSPITAL | Age: 80
End: 2025-01-09
Payer: COMMERCIAL

## 2025-01-09 VITALS
OXYGEN SATURATION: 97 % | HEART RATE: 73 BPM | TEMPERATURE: 98.6 F | SYSTOLIC BLOOD PRESSURE: 131 MMHG | RESPIRATION RATE: 16 BRPM | BODY MASS INDEX: 33.48 KG/M2 | HEIGHT: 68 IN | DIASTOLIC BLOOD PRESSURE: 77 MMHG | WEIGHT: 220.9 LBS

## 2025-01-09 DIAGNOSIS — Z86.0100 HISTORY OF COLON POLYPS: ICD-10-CM

## 2025-01-09 LAB — GLUCOSE SERPL-MCNC: 124 MG/DL (ref 65–140)

## 2025-01-09 PROCEDURE — 45385 COLONOSCOPY W/LESION REMOVAL: CPT | Performed by: INTERNAL MEDICINE

## 2025-01-09 PROCEDURE — 88305 TISSUE EXAM BY PATHOLOGIST: CPT | Performed by: PATHOLOGY

## 2025-01-09 PROCEDURE — 82948 REAGENT STRIP/BLOOD GLUCOSE: CPT

## 2025-01-09 RX ORDER — LIDOCAINE HYDROCHLORIDE 20 MG/ML
INJECTION, SOLUTION EPIDURAL; INFILTRATION; INTRACAUDAL; PERINEURAL AS NEEDED
Status: DISCONTINUED | OUTPATIENT
Start: 2025-01-09 | End: 2025-01-09

## 2025-01-09 RX ORDER — SODIUM CHLORIDE, SODIUM LACTATE, POTASSIUM CHLORIDE, CALCIUM CHLORIDE 600; 310; 30; 20 MG/100ML; MG/100ML; MG/100ML; MG/100ML
125 INJECTION, SOLUTION INTRAVENOUS CONTINUOUS
Status: DISCONTINUED | OUTPATIENT
Start: 2025-01-09 | End: 2025-01-13 | Stop reason: HOSPADM

## 2025-01-09 RX ORDER — PROPOFOL 10 MG/ML
INJECTION, EMULSION INTRAVENOUS AS NEEDED
Status: DISCONTINUED | OUTPATIENT
Start: 2025-01-09 | End: 2025-01-09

## 2025-01-09 RX ADMIN — PROPOFOL 20 MG: 10 INJECTION, EMULSION INTRAVENOUS at 08:07

## 2025-01-09 RX ADMIN — PROPOFOL 75 MG: 10 INJECTION, EMULSION INTRAVENOUS at 07:51

## 2025-01-09 RX ADMIN — PROPOFOL 25 MG: 10 INJECTION, EMULSION INTRAVENOUS at 07:55

## 2025-01-09 RX ADMIN — PROPOFOL 75 MG: 10 INJECTION, EMULSION INTRAVENOUS at 07:52

## 2025-01-09 RX ADMIN — LIDOCAINE HYDROCHLORIDE 100 MG: 20 INJECTION, SOLUTION EPIDURAL; INFILTRATION; INTRACAUDAL at 07:50

## 2025-01-09 RX ADMIN — PROPOFOL 20 MG: 10 INJECTION, EMULSION INTRAVENOUS at 08:03

## 2025-01-09 RX ADMIN — SODIUM CHLORIDE, SODIUM LACTATE, POTASSIUM CHLORIDE, AND CALCIUM CHLORIDE 125 ML/HR: .6; .31; .03; .02 INJECTION, SOLUTION INTRAVENOUS at 07:21

## 2025-01-09 NOTE — ANESTHESIA POSTPROCEDURE EVALUATION
Post-Op Assessment Note    CV Status:  Stable    Pain management: adequate       Mental Status:  Awake and sleepy   Hydration Status:  Euvolemic   PONV Controlled:  Controlled   Airway Patency:  Patent     Post Op Vitals Reviewed: Yes    No anethesia notable event occurred.    Staff: CRNA           Last Filed PACU Vitals:  Vitals Value Taken Time   Temp 98.6 °F (37 °C) 01/09/25 0811   Pulse 75 01/09/25 0811   /62 01/09/25 0811   Resp 16 01/09/25 0811   SpO2 95 % 01/09/25 0811       Modified Shireen:     Vitals Value Taken Time   Activity 1 01/09/25 0811   Respiration 2 01/09/25 0811   Circulation 2 01/09/25 0811   Consciousness 1 01/09/25 0811   Oxygen Saturation 2 01/09/25 0811     Modified Shireen Score: 8

## 2025-01-09 NOTE — ANESTHESIA PREPROCEDURE EVALUATION
Procedure:  COLONOSCOPY    Relevant Problems   CARDIO   (+) Dilatation of thoracic aorta (HCC)   (+) Hyperlipidemia      ENDO   (+) Type 2 diabetes mellitus with chronic kidney disease, with long-term current use of insulin (HCC)   (+) Type 2 diabetes mellitus with diabetic neuropathy, with long-term current use of insulin (HCC)      /RENAL   (+) Hypertensive kidney disease with chronic kidney disease   (+) Stage 4 chronic kidney disease (HCC)      NEURO/PSYCH   (+) Depression   (+) Recurrent major depressive disorder, in remission (HCC)   (+) Type 2 diabetes mellitus with diabetic neuropathy, with long-term current use of insulin (HCC)      PULMONARY   (+) Chronic obstructive pulmonary disease, unspecified COPD type (HCC)   (+) Chronic respiratory failure with hypoxia (HCC)   (+) Shortness of breath      Oncology   (+) Malignant neoplasm of lower lobe of left lung (HCC)      Cardiovascular/Peripheral Vascular   (+) Cardiomyopathy, unspecified type ([facility])      EF 60%. H/o CHF 1.5 years ago per patient. Improved since then.     On CPAP and oxygen nightly, none during the day      Physical Exam    Airway    Mallampati score: III  TM Distance: >3 FB  Neck ROM: full     Dental   Comment: edentulous     Cardiovascular  Cardiovascular exam normal    Pulmonary  Pulmonary exam normal     Other Findings  Portions of exam deferred due to low yield and/or unknown COVID status      Anesthesia Plan  ASA Score- 3     Anesthesia Type- IV sedation with anesthesia with ASA Monitors.         Additional Monitors:     Airway Plan:            Plan Factors-Exercise tolerance (METS): >4 METS.    Chart reviewed.   Existing labs reviewed. Patient summary reviewed.    Patient is not a current smoker.              Induction- intravenous.    Postoperative Plan-         Informed Consent- Anesthetic plan and risks discussed with patient.  I personally reviewed this patient with the CRNA. Discussed and agreed on the Anesthesia Plan with the  CRNA..

## 2025-01-09 NOTE — H&P
"History and Physical -  [facility]  Adam Stone 79 y.o. male MRN: [id number]      HPI: Adam Stone is a 79 y.o. year old male who presents for a history of colon polyps      REVIEW OF SYSTEMS: Per the HPI, and otherwise unremarkable.    Historical Information   Past Medical History:   Diagnosis Date    Cancer (HCC)     PROSTATE CANCER    Colon polyp     Hyperlipidemia     Prostate cancer (HCC) 05/29/2020    PTSD (post-traumatic stress disorder)     Type 2 diabetes mellitus without complication, with long-term current use of insulin (HCC) 05/29/2020    Type 2 diabetes mellitus without complication, with long-term current use of insulin (HCC) 05/29/2020     Past Surgical History:   Procedure Laterality Date    APPENDECTOMY      PROSTATE SURGERY      3 pieces of gold inserted in prostate     Social History   Social History     Substance and Sexual Activity   Alcohol Use Not Currently     Social History     Substance and Sexual Activity   Drug Use Never     Social History     Tobacco Use   Smoking Status Never   Smokeless Tobacco Never     Family History   Problem Relation Age of Onset    Diabetes Mother     Hypertension Mother     Multiple sclerosis Sister     Parkinsonism Sister        Meds/Allergies     Not in a hospital admission.    Allergies   Allergen Reactions    Lisinopril Anaphylaxis    Metoprolol Bradycardia       Objective     Blood pressure 135/71, pulse 91, temperature (!) 97.2 °F (36.2 °C), temperature source Temporal, resp. rate 16, height 5' 7.5\" (1.715 m), weight 100 kg (220 lb 14.4 oz), SpO2 98%.      PHYSICAL EXAM    Gen: NAD  CV: RRR  CHEST: Clear  ABD: soft, NT/ND  EXT: no edema      ASSESSMENT/PLAN:  This is a 79 y.o. year old male here for colonoscopy, and he is stable and optimized for his procedure.          "

## 2025-01-13 PROCEDURE — 88305 TISSUE EXAM BY PATHOLOGIST: CPT | Performed by: PATHOLOGY

## 2025-01-15 ENCOUNTER — RESULTS FOLLOW-UP (OUTPATIENT)
Dept: GASTROENTEROLOGY | Facility: CLINIC | Age: 80
End: 2025-01-15

## 2025-01-24 ENCOUNTER — OFFICE VISIT (OUTPATIENT)
Dept: FAMILY MEDICINE CLINIC | Facility: CLINIC | Age: 80
End: 2025-01-24
Payer: COMMERCIAL

## 2025-01-24 VITALS
OXYGEN SATURATION: 97 % | SYSTOLIC BLOOD PRESSURE: 118 MMHG | HEART RATE: 88 BPM | DIASTOLIC BLOOD PRESSURE: 70 MMHG | BODY MASS INDEX: 33.49 KG/M2 | TEMPERATURE: 97.4 F | HEIGHT: 68 IN | WEIGHT: 221 LBS

## 2025-01-24 DIAGNOSIS — E26.09 OTHER PRIMARY HYPERALDOSTERONISM (HCC): ICD-10-CM

## 2025-01-24 DIAGNOSIS — F32.A DEPRESSION, UNSPECIFIED DEPRESSION TYPE: ICD-10-CM

## 2025-01-24 DIAGNOSIS — C34.32 MALIGNANT NEOPLASM OF LOWER LOBE OF LEFT LUNG (HCC): ICD-10-CM

## 2025-01-24 DIAGNOSIS — N18.4 STAGE 4 CHRONIC KIDNEY DISEASE (HCC): ICD-10-CM

## 2025-01-24 DIAGNOSIS — E11.22 TYPE 2 DIABETES MELLITUS WITH STAGE 4 CHRONIC KIDNEY DISEASE, WITH LONG-TERM CURRENT USE OF INSULIN (HCC): ICD-10-CM

## 2025-01-24 DIAGNOSIS — F33.40 RECURRENT MAJOR DEPRESSIVE DISORDER, IN REMISSION (HCC): ICD-10-CM

## 2025-01-24 DIAGNOSIS — Z79.4 TYPE 2 DIABETES MELLITUS WITH STAGE 4 CHRONIC KIDNEY DISEASE, WITH LONG-TERM CURRENT USE OF INSULIN (HCC): ICD-10-CM

## 2025-01-24 DIAGNOSIS — I77.810 DILATATION OF THORACIC AORTA (HCC): ICD-10-CM

## 2025-01-24 DIAGNOSIS — E66.01 OBESITY, MORBID (HCC): ICD-10-CM

## 2025-01-24 DIAGNOSIS — E78.2 MIXED HYPERLIPIDEMIA: ICD-10-CM

## 2025-01-24 DIAGNOSIS — J96.11 CHRONIC RESPIRATORY FAILURE WITH HYPOXIA (HCC): ICD-10-CM

## 2025-01-24 DIAGNOSIS — J44.9 CHRONIC OBSTRUCTIVE PULMONARY DISEASE, UNSPECIFIED COPD TYPE (HCC): ICD-10-CM

## 2025-01-24 DIAGNOSIS — C61 PROSTATE CANCER (HCC): Primary | ICD-10-CM

## 2025-01-24 DIAGNOSIS — N18.4 TYPE 2 DIABETES MELLITUS WITH STAGE 4 CHRONIC KIDNEY DISEASE, WITH LONG-TERM CURRENT USE OF INSULIN (HCC): ICD-10-CM

## 2025-01-24 DIAGNOSIS — I42.9 CARDIOMYOPATHY, UNSPECIFIED TYPE (HCC): ICD-10-CM

## 2025-01-24 PROCEDURE — G2211 COMPLEX E/M VISIT ADD ON: HCPCS | Performed by: FAMILY MEDICINE

## 2025-01-24 PROCEDURE — 99214 OFFICE O/P EST MOD 30 MIN: CPT | Performed by: FAMILY MEDICINE

## 2025-01-24 RX ORDER — PAROXETINE 40 MG/1
40 TABLET, FILM COATED ORAL EVERY MORNING
Qty: 30 TABLET | Refills: 1
Start: 2025-01-24

## 2025-01-24 RX ORDER — CETIRIZINE HYDROCHLORIDE 5 MG/1
1 TABLET ORAL DAILY
COMMUNITY
Start: 2024-11-13

## 2025-01-24 RX ORDER — BUDESONIDE AND FORMOTEROL FUMARATE DIHYDRATE 160; 4.5 UG/1; UG/1
AEROSOL RESPIRATORY (INHALATION)
COMMUNITY
Start: 2025-01-13

## 2025-01-24 NOTE — ASSESSMENT & PLAN NOTE
Lab Results   Component Value Date    EGFR 27 09/20/2024    EGFR 23 07/18/2024    EGFR 19 04/06/2024    CREATININE 2.23 (H) 09/20/2024    CREATININE 2.52 (H) 07/18/2024    CREATININE 3.01 (H) 04/06/2024   Pt to avoid NSAIDs and any IV dyes. Patient to follow up eoither with nephrology or  with us for  further  monitoring of  renal function.

## 2025-01-24 NOTE — ASSESSMENT & PLAN NOTE
Patient to continue utilizing medical therapy as well and counseling sources as applicable for condition. If  suicidal thought or fear of suicide to contact 911 and seek help immediately. Meds reviewed and patient questions answered today   Orders:  •  PARoxetine (PAXIL) 40 MG tablet; Take 1 tablet (40 mg total) by mouth every morning

## 2025-01-24 NOTE — ASSESSMENT & PLAN NOTE
Patient is stable with current meds and discussed a low carb diet. Pt  recommended to see eye doctor and foot doctor for routine screening as per protocol. Recheck A1C  and Cr in 3 months. Patient questions answered today about this condtion.

## 2025-01-24 NOTE — PROGRESS NOTES
Name: Adam Stone      : 1945      MRN: [id number]  Encounter Provider: Marco Antonio Grove MD  Encounter Date: 2025   Encounter department: [facility]  :  Assessment & Plan  Prostate cancer (HCC)  Patient is stable  and will continue present plan of care and reassess at next routine visit. All questions about this problem from patient were answered today.        Recurrent major depressive disorder, in remission (HCC)  Patient to continue utilizing medical therapy as well and counseling sources as applicable for condition. If  suicidal thought or fear of suicide to contact 911 and seek help immediately. Meds reviewed and patient questions answered today        Mixed hyperlipidemia  Patient  is stable with current medication and we discussed a low fat low cholesterol diet. Weight loss also discussed for this will help lower cholesterol also. Recheck lipids in 6 months.        Stage 4 chronic kidney disease (HCC)  Lab Results   Component Value Date    EGFR 27 2024    EGFR 23 2024    EGFR 19 2024    CREATININE 2.23 (H) 2024    CREATININE 2.52 (H) 2024    CREATININE 3.01 (H) 2024   Pt to avoid NSAIDs and any IV dyes. Patient to follow up eoither with nephrology or  with us for  further  monitoring of  renal function.        Malignant neoplasm of lower lobe of left lung (HCC)  Patient is stable  and will continue present plan of care and reassess at next routine visit. All questions about this problem from patient were answered today.        Type 2 diabetes mellitus with stage 4 chronic kidney disease, with long-term current use of insulin (HCC)    Patient is stable with current meds and discussed a low carb diet. Pt  recommended to see eye doctor and foot doctor for routine screening as per protocol. Recheck A1C  and Cr in 3 months. Patient questions answered today about this condtion.          Depression, unspecified depression  type  Patient to continue utilizing medical therapy as well and counseling sources as applicable for condition. If  suicidal thought or fear of suicide to contact 911 and seek help immediately. Meds reviewed and patient questions answered today   Orders:  •  PARoxetine (PAXIL) 40 MG tablet; Take 1 tablet (40 mg total) by mouth every morning    Chronic respiratory failure with hypoxia (HCC)    Chronic obstructive pulmonary disease, unspecified COPD type (HCC)    Dilatation of thoracic aorta (HCC)    Other primary hyperaldosteronism (HCC)    Cardiomyopathy, unspecified type (HCC)    Obesity, morbid (HCC)          Falls Plan of Care: Recommended assistive device to help with gait and balance.     History of Present Illness   79-year-old male here today for checkup on multimedical process patient with history of lung cancer COPD prostate cancer history of diabetes retention and here for his follow-up.  [facility].  We provide most of his medicines because [facility] which is the purpose for today's visit.  Patient does not need refills today and will call when appropriate to refill them.  [facility] and will need to provide the results of that for our office to update his chart.      Review of Systems   Constitutional:  Negative for activity change, appetite change, chills, fatigue, fever and unexpected weight change.   HENT:  Negative for congestion, ear pain, hearing loss, mouth sores, postnasal drip, sinus pressure, sinus pain, sneezing and sore throat.    Respiratory:  Negative for apnea, cough, shortness of breath and wheezing.    Cardiovascular:  Negative for chest pain, palpitations and leg swelling.   Gastrointestinal:  Negative for abdominal pain, constipation, diarrhea, nausea and vomiting.   Endocrine: Negative for cold intolerance and heat intolerance.   Genitourinary:   "Negative for dysuria, frequency and hematuria.   Musculoskeletal:  Negative for arthralgias, back pain, gait problem, joint swelling and neck pain.   Skin:  Negative for rash.   Neurological:  Negative for dizziness, weakness and numbness.   Hematological:  Does not bruise/bleed easily.   Psychiatric/Behavioral:  Negative for agitation, behavioral problems, confusion, hallucinations and sleep disturbance. The patient is not nervous/anxious.        Objective   /70 (BP Location: Left arm, Patient Position: Sitting, Cuff Size: Large)   Pulse 88   Temp (!) 97.4 °F (36.3 °C) (Temporal)   Ht 5' 7.5\" (1.715 m)   Wt 100 kg (221 lb)   SpO2 97%   BMI 34.10 kg/m²      Physical Exam  Constitutional:       Appearance: He is well-developed.   HENT:      Head: Normocephalic and atraumatic.      Right Ear: External ear normal.      Left Ear: External ear normal.      Nose: Nose normal.      Mouth/Throat:      Pharynx: Oropharynx is clear. No oropharyngeal exudate.   Eyes:      General: No scleral icterus.     Conjunctiva/sclera: Conjunctivae normal.      Pupils: Pupils are equal, round, and reactive to light.   Cardiovascular:      Rate and Rhythm: Normal rate and regular rhythm.      Heart sounds: Normal heart sounds.   Pulmonary:      Effort: Pulmonary effort is normal.      Breath sounds: Normal breath sounds. No wheezing or rales.   Abdominal:      General: Bowel sounds are normal.      Palpations: Abdomen is soft.      Tenderness: There is no abdominal tenderness. There is no guarding.   Musculoskeletal:         General: Normal range of motion.      Cervical back: Normal range of motion and neck supple.   Skin:     General: Skin is warm and dry.      Findings: No erythema or rash.   Neurological:      Mental Status: He is alert and oriented to person, place, and time. Mental status is at baseline.   Psychiatric:         Mood and Affect: Mood normal.         Behavior: Behavior normal.         Thought Content: " Thought content normal.         Judgment: Judgment normal.

## 2025-04-08 ENCOUNTER — APPOINTMENT (EMERGENCY)
Dept: RADIOLOGY | Facility: HOSPITAL | Age: 80
End: 2025-04-08
Payer: COMMERCIAL

## 2025-04-08 ENCOUNTER — HOSPITAL ENCOUNTER (EMERGENCY)
Facility: HOSPITAL | Age: 80
Discharge: HOME | End: 2025-04-08
Attending: EMERGENCY MEDICINE
Payer: COMMERCIAL

## 2025-04-08 ENCOUNTER — TELEPHONE (OUTPATIENT)
Age: 80
End: 2025-04-08

## 2025-04-08 ENCOUNTER — APPOINTMENT (EMERGENCY)
Dept: CT IMAGING | Facility: HOSPITAL | Age: 80
End: 2025-04-08
Payer: COMMERCIAL

## 2025-04-08 ENCOUNTER — APPOINTMENT (EMERGENCY)
Dept: MRI IMAGING | Facility: HOSPITAL | Age: 80
End: 2025-04-08
Payer: COMMERCIAL

## 2025-04-08 VITALS
RESPIRATION RATE: 20 BRPM | OXYGEN SATURATION: 93 % | TEMPERATURE: 98.5 F | DIASTOLIC BLOOD PRESSURE: 75 MMHG | SYSTOLIC BLOOD PRESSURE: 124 MMHG | HEART RATE: 87 BPM

## 2025-04-08 DIAGNOSIS — R42 VERTIGO: Primary | ICD-10-CM

## 2025-04-08 PROBLEM — R29.90 STROKE-LIKE SYMPTOMS: Status: ACTIVE | Noted: 2025-04-08

## 2025-04-08 LAB
2HR DELTA HS TROPONIN: 0 NG/L
4HR DELTA HS TROPONIN: 0 NG/L
ALBUMIN SERPL BCG-MCNC: 4.4 G/DL (ref 3.5–5)
ALP SERPL-CCNC: 54 U/L (ref 34–104)
ALT SERPL W P-5'-P-CCNC: 19 U/L (ref 7–52)
ANION GAP SERPL CALCULATED.3IONS-SCNC: 9 MMOL/L (ref 4–13)
AST SERPL W P-5'-P-CCNC: 16 U/L (ref 13–39)
ATRIAL RATE: 89 BPM
BASOPHILS # BLD AUTO: 0.04 THOUSANDS/ÂΜL (ref 0–0.1)
BASOPHILS NFR BLD AUTO: 1 % (ref 0–1)
BILIRUB SERPL-MCNC: 0.67 MG/DL (ref 0.2–1)
BILIRUB UR QL STRIP: NEGATIVE
BNP SERPL-MCNC: 60 PG/ML (ref 0–100)
BUN SERPL-MCNC: 25 MG/DL (ref 5–25)
CALCIUM SERPL-MCNC: 9.1 MG/DL (ref 8.4–10.2)
CARDIAC TROPONIN I PNL SERPL HS: 4 NG/L (ref ?–50)
CHLORIDE SERPL-SCNC: 104 MMOL/L (ref 96–108)
CLARITY UR: CLEAR
CO2 SERPL-SCNC: 24 MMOL/L (ref 21–32)
COLOR UR: NORMAL
CREAT SERPL-MCNC: 2.35 MG/DL (ref 0.6–1.3)
EOSINOPHIL # BLD AUTO: 0.13 THOUSAND/ÂΜL (ref 0–0.61)
EOSINOPHIL NFR BLD AUTO: 2 % (ref 0–6)
ERYTHROCYTE [DISTWIDTH] IN BLOOD BY AUTOMATED COUNT: 12.9 % (ref 11.6–15.1)
GFR SERPL CREATININE-BSD FRML MDRD: 25 ML/MIN/1.73SQ M
GLUCOSE SERPL-MCNC: 162 MG/DL (ref 65–140)
GLUCOSE SERPL-MCNC: 177 MG/DL (ref 65–140)
GLUCOSE SERPL-MCNC: 182 MG/DL (ref 65–140)
GLUCOSE UR STRIP-MCNC: NEGATIVE MG/DL
HCT VFR BLD AUTO: 37.7 % (ref 36.5–49.3)
HGB BLD-MCNC: 12 G/DL (ref 12–17)
HGB UR QL STRIP.AUTO: NEGATIVE
IMM GRANULOCYTES # BLD AUTO: 0.03 THOUSAND/UL (ref 0–0.2)
IMM GRANULOCYTES NFR BLD AUTO: 1 % (ref 0–2)
KETONES UR STRIP-MCNC: NEGATIVE MG/DL
LEUKOCYTE ESTERASE UR QL STRIP: NEGATIVE
LYMPHOCYTES # BLD AUTO: 1.25 THOUSANDS/ÂΜL (ref 0.6–4.47)
LYMPHOCYTES NFR BLD AUTO: 20 % (ref 14–44)
MCH RBC QN AUTO: 29.7 PG (ref 26.8–34.3)
MCHC RBC AUTO-ENTMCNC: 31.8 G/DL (ref 31.4–37.4)
MCV RBC AUTO: 93 FL (ref 82–98)
MONOCYTES # BLD AUTO: 0.4 THOUSAND/ÂΜL (ref 0.17–1.22)
MONOCYTES NFR BLD AUTO: 6 % (ref 4–12)
NEUTROPHILS # BLD AUTO: 4.36 THOUSANDS/ÂΜL (ref 1.85–7.62)
NEUTS SEG NFR BLD AUTO: 70 % (ref 43–75)
NITRITE UR QL STRIP: NEGATIVE
NRBC BLD AUTO-RTO: 0 /100 WBCS
P AXIS: 39 DEGREES
PH UR STRIP.AUTO: 5 [PH]
PLATELET # BLD AUTO: 144 THOUSANDS/UL (ref 149–390)
PMV BLD AUTO: 10.6 FL (ref 8.9–12.7)
POTASSIUM SERPL-SCNC: 4.4 MMOL/L (ref 3.5–5.3)
PR INTERVAL: 144 MS
PROT SERPL-MCNC: 7 G/DL (ref 6.4–8.4)
PROT UR STRIP-MCNC: NEGATIVE MG/DL
QRS AXIS: -27 DEGREES
QRSD INTERVAL: 86 MS
QT INTERVAL: 356 MS
QTC INTERVAL: 433 MS
RBC # BLD AUTO: 4.04 MILLION/UL (ref 3.88–5.62)
SODIUM SERPL-SCNC: 137 MMOL/L (ref 135–147)
SP GR UR STRIP.AUTO: 1.01 (ref 1–1.03)
T WAVE AXIS: 77 DEGREES
UROBILINOGEN UR STRIP-ACNC: <2 MG/DL
VENTRICULAR RATE: 89 BPM
WBC # BLD AUTO: 6.21 THOUSAND/UL (ref 4.31–10.16)

## 2025-04-08 PROCEDURE — 81003 URINALYSIS AUTO W/O SCOPE: CPT | Performed by: EMERGENCY MEDICINE

## 2025-04-08 PROCEDURE — 71045 X-RAY EXAM CHEST 1 VIEW: CPT

## 2025-04-08 PROCEDURE — 85025 COMPLETE CBC W/AUTO DIFF WBC: CPT | Performed by: EMERGENCY MEDICINE

## 2025-04-08 PROCEDURE — 99204 OFFICE O/P NEW MOD 45 MIN: CPT | Performed by: PSYCHIATRY & NEUROLOGY

## 2025-04-08 PROCEDURE — 93005 ELECTROCARDIOGRAM TRACING: CPT

## 2025-04-08 PROCEDURE — 82948 REAGENT STRIP/BLOOD GLUCOSE: CPT

## 2025-04-08 PROCEDURE — 99285 EMERGENCY DEPT VISIT HI MDM: CPT | Performed by: EMERGENCY MEDICINE

## 2025-04-08 PROCEDURE — 93010 ELECTROCARDIOGRAM REPORT: CPT | Performed by: INTERNAL MEDICINE

## 2025-04-08 PROCEDURE — 80053 COMPREHEN METABOLIC PANEL: CPT | Performed by: EMERGENCY MEDICINE

## 2025-04-08 PROCEDURE — 36415 COLL VENOUS BLD VENIPUNCTURE: CPT | Performed by: EMERGENCY MEDICINE

## 2025-04-08 PROCEDURE — 70450 CT HEAD/BRAIN W/O DYE: CPT

## 2025-04-08 PROCEDURE — 99284 EMERGENCY DEPT VISIT MOD MDM: CPT

## 2025-04-08 PROCEDURE — 70551 MRI BRAIN STEM W/O DYE: CPT

## 2025-04-08 PROCEDURE — 84484 ASSAY OF TROPONIN QUANT: CPT | Performed by: EMERGENCY MEDICINE

## 2025-04-08 PROCEDURE — 83880 ASSAY OF NATRIURETIC PEPTIDE: CPT | Performed by: EMERGENCY MEDICINE

## 2025-04-08 RX ORDER — MECLIZINE HYDROCHLORIDE 25 MG/1
25 TABLET ORAL ONCE
Status: COMPLETED | OUTPATIENT
Start: 2025-04-08 | End: 2025-04-08

## 2025-04-08 RX ORDER — MECLIZINE HYDROCHLORIDE 25 MG/1
25 TABLET ORAL 3 TIMES DAILY PRN
Qty: 30 TABLET | Refills: 0 | Status: SHIPPED | OUTPATIENT
Start: 2025-04-08 | End: 2025-04-16 | Stop reason: SDUPTHER

## 2025-04-08 RX ADMIN — MECLIZINE HYDROCHLORIDE 25 MG: 25 TABLET ORAL at 13:11

## 2025-04-08 NOTE — ED PROVIDER NOTES
Time reflects when diagnosis was documented in both MDM as applicable and the Disposition within this note       Time User Action Codes Description Comment    4/8/2025  2:07 PM Nancie Martinez Add [R42] Vertigo           ED Disposition       ED Disposition   Discharge    Condition   Stable    Date/Time   Tue Apr 8, 2025  4:22 PM    Comment   Adam Olivaruff discharge to home/self care.                   Assessment & Plan       Medical Decision Making  Patient is a 79-year-old male who presents to the emergency department with multiple complaints.  States that he has had vertigo in the past.  He attributed it initially to a medication error several years ago.  Reports on and off positional vertigo since that time.  States that this episode is more persistent and more severe.  Denies other neurocomplaints.  States that he is also worried about CHF.  Reports that he is gaining weight and has noticed lower extremity swelling.    Ddx includes but is not limited to: positional vertigo, CVA ischemic vs hemorrhagic, metastatic prostate cancer; CHF exacerbation, less likely ACS, amlodipine med effects, worsening renal dysfunction in the setting of known CKD stage IV, hepatic dysfunction.  Does not appear overtly volume overloaded without respiratory distress, clear lungs, and w/ only trace edema.     Amount and/or Complexity of Data Reviewed  External Data Reviewed: ECG.     Details: See below  Labs: ordered. Decision-making details documented in ED Course.  Radiology: ordered and independent interpretation performed. Decision-making details documented in ED Course.     Details: No consolidation or infiltrate  No acute ICH appreciated  ECG/medicine tests: independent interpretation performed.     Details: Normal sinus rhythm 89, normal axis, normal intervals, nonspecific ST flattening throughout, abnormal EKG, no significant change from prior  Discussion of management or test interpretation with external provider(s):  "Case reviewed and discussed with neurology - recommend stat MRI and will eval.   - Recommend continue Meclizine 25 mg Q8H PRN.   - Recommend outpatient vestibular therapy.   - Recommend follow-up with general neurology team and if symptoms persist, consider referral to ENT.   - Defer ongoing evaluation and management of patient's underlying medical comorbidities and complaints of leg swelling and \"fluttering\" as per primary team.  - Above case and plan discussed with attending neurologist and she is in agreement with above plan of care.  I have discussed the above management plan in detail with the primary service.      Adam Stone will need follow-up in in 6 weeks with general neurology team for Peripheral vertigo in 60 minute appointment. They will not require outpatient neurological testing.;        Risk  Prescription drug management.  Decision regarding hospitalization.        ED Course as of 04/08/25 2012 Tue Apr 08, 2025   1201 CT chest wo contrast (11/15/24 1605)   1233 HS Troponin 0hr (reflex protocol)   1311 B-Type Natriuretic Peptide(BNP)   1557 MRI brain wo contrast   1608 HS Troponin I 2hr   1609 Comprehensive metabolic panel(!)  Stable renal function       Medications   meclizine (ANTIVERT) tablet 25 mg (25 mg Oral Given 4/8/25 1311)       ED Risk Strat Scores                    No data recorded        SBIRT 20yo+      Flowsheet Row Most Recent Value   Initial Alcohol Screen: US AUDIT-C     1. How often do you have a drink containing alcohol? 0 Filed at: 04/08/2025 1630   2. How many drinks containing alcohol do you have on a typical day you are drinking?  0 Filed at: 04/08/2025 1630   3b. FEMALE Any Age, or MALE 65+: How often do you have 4 or more drinks on one occassion? 0 Filed at: 04/08/2025 1630   Audit-C Score 0 Filed at: 04/08/2025 1630   [facility]: How many times in the past year have you...    Used an illegal drug or used a prescription medication for non-medical reasons? Never Filed at: " "04/08/2025 1630                            History of Present Illness       Chief Complaint   Patient presents with    Dizziness     Pt reports dizziness that began 3 days ago. Hx of vertigo, \"a long time ago\"        Past Medical History:   Diagnosis Date    Cancer (HCC)     PROSTATE CANCER    Colon polyp     Hyperlipidemia     Prostate cancer ([facility]) 05/29/2020    PTSD (post-traumatic stress disorder)     Type 2 diabetes mellitus without complication, with long-term current use of insulin ([facility]) 05/29/2020    Type 2 diabetes mellitus without complication, with long-term current use of insulin ([facility]) 05/29/2020      Past Surgical History:   Procedure Laterality Date    APPENDECTOMY      PROSTATE SURGERY      3 pieces of gold inserted in prostate      Family History   Problem Relation Age of Onset    Diabetes Mother     Hypertension Mother     Multiple sclerosis Sister     Parkinsonism Sister       Social History     Tobacco Use    Smoking status: Never    Smokeless tobacco: Never   Vaping Use    Vaping status: Never Used   Substance Use Topics    Alcohol use: Not Currently    Drug use: Never      E-Cigarette/Vaping    E-Cigarette Use Never User       E-Cigarette/Vaping Substances    Nicotine No     THC No     CBD No     Flavoring No     Other No     Unknown No       I have reviewed and agree with the history as documented.     Patient is a 79 yoM who presents with vertigo + LE swelling          Review of Systems   Cardiovascular:  Positive for leg swelling.   Neurological:  Positive for dizziness.           Objective       ED Triage Vitals   Temperature Pulse Blood Pressure Respirations SpO2 Patient Position - Orthostatic VS   04/08/25 1125 04/08/25 1125 04/08/25 1125 04/08/25 1125 04/08/25 1125 04/08/25 1125   98.5 °F (36.9 °C) 92 145/77 22 98 % Sitting      Temp Source Heart Rate Source BP Location FiO2 (%) Pain Score    04/08/25 1125 04/08/25 1125 04/08/25 1125 -- 04/08/25 1630    Temporal Monitor Left arm  No Pain  "     Vitals      Date and Time Temp Pulse SpO2 Resp BP Pain Score FACES Pain Rating User   04/08/25 1630 -- 87 93 % 20 124/75 No Pain -- RCC   04/08/25 1600 -- 90 92 % 18 141/74 -- -- RCC   04/08/25 1400 -- 90 96 % 20 141/77 -- -- RCC   04/08/25 1330 -- 93 96 % 20 -- -- -- RCC   04/08/25 1300 -- 91 92 % 18 118/76 -- -- NW   04/08/25 1230 -- 93 95 % 21 139/76 -- -- SB   04/08/25 1200 -- 90 97 % 20 -- -- -- NW   04/08/25 1125 98.5 °F (36.9 °C) 92 98 % 22 145/77 -- -- GP            Physical Exam  Vitals and nursing note reviewed.   Constitutional:       General: He is not in acute distress.     Appearance: Normal appearance.   HENT:      Head: Normocephalic and atraumatic.      Right Ear: External ear normal.      Left Ear: External ear normal.      Nose: Nose normal.   Eyes:      Extraocular Movements: Extraocular movements intact.      Pupils: Pupils are equal, round, and reactive to light.   Cardiovascular:      Rate and Rhythm: Normal rate and regular rhythm.   Pulmonary:      Effort: Pulmonary effort is normal.      Breath sounds: Normal breath sounds.   Abdominal:      General: There is no distension.      Palpations: Abdomen is soft.      Tenderness: There is no abdominal tenderness.   Musculoskeletal:      Right lower leg: Edema present.      Left lower leg: Edema present.   Skin:     General: Skin is warm and dry.   Neurological:      General: No focal deficit present.      Mental Status: He is alert and oriented to person, place, and time. Mental status is at baseline.      Cranial Nerves: No cranial nerve deficit, dysarthria or facial asymmetry.      Sensory: No sensory deficit.      Motor: Tremor present. No pronator drift.      Coordination: Finger-Nose-Finger Test abnormal.   Psychiatric:         Behavior: Behavior normal.         Results Reviewed       Procedure Component Value Units Date/Time    HS Troponin I 4hr [[id number]]  (Normal) Collected: 04/08/25 1549    Lab Status: Final result Specimen: Blood  from Arm, Left Updated: 04/08/25 1623     hs TnI 4hr 4 ng/L      Delta 4hr hsTnI 0 ng/L     HS Troponin I 2hr [[id number]]  (Normal) Collected: 04/08/25 1356    Lab Status: Final result Specimen: Blood from Arm, Left Updated: 04/08/25 1424     hs TnI 2hr 4 ng/L      Delta 2hr hsTnI 0 ng/L     UA (URINE) with reflex to Scope [[id number]] Collected: 04/08/25 1320    Lab Status: Final result Specimen: Urine, Clean Catch Updated: 04/08/25 1331     Color, UA Light Yellow     Clarity, UA Clear     Specific Gravity, UA 1.014     pH, UA 5.0     Leukocytes, UA Negative     Nitrite, UA Negative     Protein, UA Negative mg/dl      Glucose, UA Negative mg/dl      Ketones, UA Negative mg/dl      Urobilinogen, UA <2.0 mg/dl      Bilirubin, UA Negative     Occult Blood, UA Negative    Fingerstick Glucose (POCT) [[id number]]  (Abnormal) Collected: 04/08/25 1318    Lab Status: Final result Specimen: Blood Updated: 04/08/25 1319     POC Glucose 162 mg/dl     B-Type Natriuretic Peptide(BNP) [[id number]]  (Normal) Collected: 04/08/25 1202    Lab Status: Final result Specimen: Blood from Arm, Left Updated: 04/08/25 1236     BNP 60 pg/mL     HS Troponin 0hr (reflex protocol) [[id number]]  (Normal) Collected: 04/08/25 1202    Lab Status: Final result Specimen: Blood from Arm, Left Updated: 04/08/25 1231     hs TnI 0hr 4 ng/L     Comprehensive metabolic panel [[id number]]  (Abnormal) Collected: 04/08/25 1202    Lab Status: Final result Specimen: Blood from Arm, Left Updated: 04/08/25 1225     Sodium 137 mmol/L      Potassium 4.4 mmol/L      Chloride 104 mmol/L      CO2 24 mmol/L      ANION GAP 9 mmol/L      BUN 25 mg/dL      Creatinine 2.35 mg/dL      Glucose 177 mg/dL      Calcium 9.1 mg/dL      AST 16 U/L      ALT 19 U/L      Alkaline Phosphatase 54 U/L      Total Protein 7.0 g/dL      Albumin 4.4 g/dL      Total Bilirubin 0.67 mg/dL      eGFR 25 ml/min/1.73sq m     Narrative:      National Kid[employer] guidelines for Chronic  Kidney Disease (CKD):     Stage 1 with normal or high GFR (GFR > 90 mL/min/1.73 square meters)    Stage 2 Mild CKD (GFR = 60-89 mL/min/1.73 square meters)    Stage 3A Moderate CKD (GFR = 45-59 mL/min/1.73 square meters)    Stage 3B Moderate CKD (GFR = 30-44 mL/min/1.73 square meters)    Stage 4 Severe CKD (GFR = 15-29 mL/min/1.73 square meters)    Stage 5 End Stage CKD (GFR <15 mL/min/1.73 square meters)  Note: GFR calculation is accurate only with a steady state creatinine    CBC and differential [[id number]]  (Abnormal) Collected: 04/08/25 1202    Lab Status: Final result Specimen: Blood from Arm, Left Updated: 04/08/25 1207     WBC 6.21 Thousand/uL      RBC 4.04 Million/uL      Hemoglobin 12.0 g/dL      Hematocrit 37.7 %      MCV 93 fL      MCH 29.7 pg      MCHC 31.8 g/dL      RDW 12.9 %      MPV 10.6 fL      Platelets 144 Thousands/uL      nRBC 0 /100 WBCs      Segmented % 70 %      Immature Grans % 1 %      Lymphocytes % 20 %      Monocytes % 6 %      Eosinophils Relative 2 %      Basophils Relative 1 %      Absolute Neutrophils 4.36 Thousands/µL      Absolute Immature Grans 0.03 Thousand/uL      Absolute Lymphocytes 1.25 Thousands/µL      Absolute Monocytes 0.40 Thousand/µL      Eosinophils Absolute 0.13 Thousand/µL      Basophils Absolute 0.04 Thousands/µL     Fingerstick Glucose (POCT) [[id number]]  (Abnormal) Collected: 04/08/25 1127    Lab Status: Final result Specimen: Blood Updated: 04/08/25 1128     POC Glucose 182 mg/dl             MRI brain wo contrast   Final Interpretation by Hal Flowers DO (04/08 1520)      Normal MRI of the brain. No acute intracranial abnormality.      Workstation performed: [id number]         CT head without contrast   Final Interpretation by Alton Marina MD (04/08 1250)      No evidence of acute intracranial process.      Chronic microangiopathy                  Workstation performed: [id number]         XR chest 1 view portable   Final Interpretation  by Opal Perez MD (04/08 7608)   No acute consolidation            Workstation performed: [id number]             Procedures    ED Medication and Procedure Management   Prior to Admission Medications   Prescriptions Last Dose Informant Patient Reported? Taking?   Artificial Tear Solution (Just Tears Eye Drops) SOLN   Yes No   Sig: INSTILL 1 DROP BOTH EYES TWICE A DAY OR AS NEEDED FOR DRY EYE   Continuous Blood Gluc  (FreeStyle Hugo 2 [place]) SHAI   No No   Sig: Check blood sugars multiple times per day   Continuous Blood Gluc Sensor (FreeStyle Hugo 2 Sensor) MISC   No No   Sig: Check blood sugars multiple times per day   Continuous Blood Gluc Sensor (FreeStyle Hugo 2 Sensor) MISC   No No   Sig: Check blood sugars multiple times per day   Diclofenac Sodium (VOLTAREN) 1 %   Yes No   Sig: APPLY 2GM TO UPPER EXTREMITIES TOPICALLY TWICE A DAY **USE DOSING CARD** (DO NOT EXCEED 16 GRAMS DAILY TO ANY AFFECTED JOINT OF THE LOWER EXTREMITIES. DO NOT EXCEED 8 GRAMS DAILY TO ANY AFFECTED JOINT OF THE UPPER EXTREMITIES. DO NOT EXCEED A TOTAL DOSE OF 32 GRAMS DAILY OVER ALL JOINTS)   PARoxetine (PAXIL) 40 MG tablet   No No   Sig: Take 1 tablet (40 mg total) by mouth every morning   amLODIPine (NORVASC) 10 mg tablet   No No   Sig: Take 1 tablet (10 mg total) by mouth daily   aspirin (ECOTRIN LOW STRENGTH) 81 mg EC tablet   Yes No   Sig: Take 81 mg by mouth daily   budesonide-formoterol (SYMBICORT) 160-4.5 mcg/act inhaler   Yes No   Sig: Inhale   carboxymethylcellulose (REFRESH PLUS) 0.5 % SOLN   Yes No   Sig: INSTILL 1 DROP BOTH EYES THREE TIMES A DAY AS NEEDED FOR DRY EYES   cetirizine (ZyrTEC) 5 MG tablet   Yes No   Sig: Take 1 tablet by mouth daily   cholecalciferol (VITAMIN D3) 400 units tablet  Spouse/Significant Other Yes No   Sig: Take 400 Units by mouth daily   Patient not taking: Reported on 9/20/2024   cloNIDine (CATAPRES) 0.1 mg tablet   No No   Sig: TAKE 1 TABLET BY MOUTH TWICE A DAY   clonazePAM  (KlonoPIN) 0.5 MG disintegrating tablet   No No   Sig: TAKE 1 TABLET BY MOUTH 2 TIMES A DAY.   gabapentin (Neurontin) 300 mg capsule   No No   Sig: Take 1 capsule (300 mg total) by mouth 2 (two) times a day   glucose blood test strip  Spouse/Significant Other Yes No   Si each by Other route daily as needed Use as instructed   ipratropium-albuterol (DUO-NEB) 0.5-2.5 mg/3 mL nebulizer solution   Yes No   Sig: INHALE 1 VIAL IN NEBULIZER TWICE A DAY   ketotifen (ZADITOR) 0.025 % ophthalmic solution   Yes No   Sig: INSTILL 1 DROP BOTH EYES TWICE A DAY FOR ALLERGY   Patient not taking: Reported on 2024   latanoprost (XALATAN) 0.005 % ophthalmic solution   Yes No   Si drop daily at bedtime   lidocaine (LIDODERM) 5 %   Yes No   Sig: APPLY UP TO 3 PATCHES TOPICALLY EVERY DAY FOR PAIN (REMOVE PATCH AFTER 12 HOURS; 12 HOURS ON AND 12 HOURS OFF)   multivitamin (THERAGRAN) TABS  Spouse/Significant Other Yes No   Sig: Take 1 tablet by mouth daily   oxyCODONE (ROXICODONE) 10 MG TABS   No No   Sig: Take 1 tablet (10 mg total) by mouth every 6 (six) hours as needed for moderate pain Max Daily Amount: 40 mg   semaglutide, 0.25 or 0.5 mg/dose, (Ozempic) 2 mg/3 mL injection pen   Yes No   Sig: INJECT 0.5MG SUBCUTANEOUSLY ONCE WEEKLY FOR DIABETES   traZODone (DESYREL) 50 mg tablet   No No   Sig: TAKE 1 TABLET BY MOUTH EVERYDAY AT BEDTIME      Facility-Administered Medications: None     Discharge Medication List as of 2025  4:24 PM        START taking these medications    Details   meclizine (ANTIVERT) 25 mg tablet Take 1 tablet (25 mg total) by mouth 3 (three) times a day as needed for dizziness, Starting 2025, Normal           CONTINUE these medications which have NOT CHANGED    Details   amLODIPine (NORVASC) 10 mg tablet Take 1 tablet (10 mg total) by mouth daily, Starting Tue 12/10/2024, Normal      Artificial Tear Solution (Just Tears Eye Drops) SOLN INSTILL 1 DROP BOTH EYES TWICE A DAY OR AS NEEDED FOR DRY  EYE, Historical Med      aspirin (ECOTRIN LOW STRENGTH) 81 mg EC tablet Take 81 mg by mouth daily, Historical Med      budesonide-formoterol (SYMBICORT) 160-4.5 mcg/act inhaler Inhale, Starting Mon 1/13/2025, Historical Med      carboxymethylcellulose (REFRESH PLUS) 0.5 % SOLN INSTILL 1 DROP BOTH EYES THREE TIMES A DAY AS NEEDED FOR DRY EYES, Historical Med      cetirizine (ZyrTEC) 5 MG tablet Take 1 tablet by mouth daily, Starting Wed 11/13/2024, Historical Med      cholecalciferol (VITAMIN D3) 400 units tablet Take 400 Units by mouth daily, Historical Med      clonazePAM (KlonoPIN) 0.5 MG disintegrating tablet TAKE 1 TABLET BY MOUTH 2 TIMES A DAY., Starting Sat 8/3/2024, Normal      cloNIDine (CATAPRES) 0.1 mg tablet TAKE 1 TABLET BY MOUTH TWICE A DAY, Normal      Continuous Blood Gluc  (FreeStyle Hugo 2 [place]) SHAI Check blood sugars multiple times per day, Print      !! Continuous Blood Gluc Sensor (FreeStyle Hugo 2 Sensor) MISC Check blood sugars multiple times per day, Normal      !! Continuous Blood Gluc Sensor (FreeStyle Hugo 2 Sensor) MISC Check blood sugars multiple times per day, Print      Diclofenac Sodium (VOLTAREN) 1 % APPLY 2GM TO UPPER EXTREMITIES TOPICALLY TWICE A DAY **USE DOSING CARD** (DO NOT EXCEED 16 GRAMS DAILY TO ANY AFFECTED JOINT OF THE LOWER EXTREMITIES. DO NOT EXCEED 8 GRAMS DAILY TO ANY AFFECTED JOINT OF THE UPPER EXTREMITIES. DO NOT EXCEED A TOTAL DOSE  OF 32 GRAMS DAILY OVER ALL JOINTS), Historical Med      gabapentin (Neurontin) 300 mg capsule Take 1 capsule (300 mg total) by mouth 2 (two) times a day, Starting Wed 7/10/2024, Until Thu 12/26/2024, Normal      glucose blood test strip 1 each by Other route daily as needed Use as instructed, Historical Med      ipratropium-albuterol (DUO-NEB) 0.5-2.5 mg/3 mL nebulizer solution INHALE 1 VIAL IN NEBULIZER TWICE A DAY, Historical Med      ketotifen (ZADITOR) 0.025 % ophthalmic solution INSTILL 1 DROP BOTH EYES TWICE A DAY FOR  ALLERGY, Historical Med      latanoprost (XALATAN) 0.005 % ophthalmic solution 1 drop daily at bedtime, Historical Med      lidocaine (LIDODERM) 5 % APPLY UP TO 3 PATCHES TOPICALLY EVERY DAY FOR PAIN (REMOVE PATCH AFTER 12 HOURS; 12 HOURS ON AND 12 HOURS OFF), Historical Med      multivitamin (THERAGRAN) TABS Take 1 tablet by mouth daily, Historical Med      oxyCODONE (ROXICODONE) 10 MG TABS Take 1 tablet (10 mg total) by mouth every 6 (six) hours as needed for moderate pain Max Daily Amount: 40 mg, Starting Fri 10/20/2023, Normal      PARoxetine (PAXIL) 40 MG tablet Take 1 tablet (40 mg total) by mouth every morning, Starting Fri 1/24/2025, No Print      semaglutide, 0.25 or 0.5 mg/dose, (Ozempic) 2 mg/3 mL injection pen INJECT 0.5MG SUBCUTANEOUSLY ONCE WEEKLY FOR DIABETES, Historical Med      traZODone (DESYREL) 50 mg tablet TAKE 1 TABLET BY MOUTH EVERYDAY AT BEDTIME, Normal       !! - Potential duplicate medications found. Please discuss with provider.          ED SEPSIS DOCUMENTATION   Time reflects when diagnosis was documented in both MDM as applicable and the Disposition within this note       Time User Action Codes Description Comment    4/8/2025  2:07 PM Nancie Martinez Add [R42] Vertigo                  Nancie Martinez MD  04/08/25 2013

## 2025-04-08 NOTE — TELEPHONE ENCOUNTER
STILL ADMITTED : 4/8/2025 - present  [facility]    1ST ATTEMPT,     [facility] [employer]     Thank you,     Brandy       ALEJANDRINA/ ZULEMA WEBB/ Peripheral vertigo    ----- Message from Vanna Chung PA-C sent at 4/8/2025  3:46 PM EDT -----  Regarding: HFU  Adam Stone will need follow-up in in 6 weeks with general neurology team for Peripheral vertigo in 60 minute appointment. They will not require outpatient neurological testing.

## 2025-04-08 NOTE — CONSULTS
"Consultation - Neurology   Name: Adam Stone 79 y.o. male I MRN: [id number]  Unit/Bed#: FT 04 I Date of Admission: 4/8/2025   Date of Service: 4/8/2025 I Hospital Day: 0     Consult to neurology  Consult performed by: Vanna Chung PA-C  Consult ordered by: Nancie Martinez MD        Physician Requesting Evaluation: Nancie Martinez MD   Reason for Evaluation / Principal Problem: Dizziness    Assessment & Plan  Dizziness  78 y/o male with prostate cancer, HLD, DM, who presented to the ED on 4/8 with complaints of vertigo, weight gain, and LE swelling. BP on presentation 145/77. Patient seen and examined and reports longstanding history of vertigo with current episode lasting 3 days.    Neuroimaging  4/8/2025 CTH:   No evidence of acute intracranial process.   Chronic microangiopathy.   4/8/2025 MRI brain without contrast:   Normal MRI of the brain.   No acute intracranial abnormality.     Suspect symptoms secondary to BPPV.     Plan:  - Recommend continue Meclizine 25 mg Q8H PRN.   - Recommend outpatient vestibular therapy.   - Recommend follow-up with general neurology team and if symptoms persist, consider referral to ENT.   - Defer ongoing evaluation and management of patient's underlying medical comorbidities and complaints of leg swelling and \"fluttering\" as per primary team.  - Above case and plan discussed with attending neurologist and she is in agreement with above plan of care.  I have discussed the above management plan in detail with the primary service.     Adam Stone will need follow-up in in 6 weeks with general neurology team for Peripheral vertigo in 60 minute appointment. They will not require outpatient neurological testing.;      History of Present Illness   Adam Stone is a 79 y.o. right handed male with history of prostate cancer s/p XRT and ADT, non-small cell lung CA s/p SBRT, HLD, DM, optic neuropathy, who presents to the ED on 4/8 with complaints of vertigo, weight " "gain, and LE swelling. Patient reported to the ED team that he has had vertigo in the past and has been having it intermittently, but the episodes have become more persistent and severe. BP on presentation 145/77. CTH unremarkable for acute changes.    Patient notes he has had vertigo for many years. He notes that this initially started about 10 years ago after he [facility] and was started on \"heart medication.\" He is not able to state what this medication was but notes he was started on \"50 mg\" and the dizziness began after this medication was started. He notes he was then seen at [facility] many years ago and prescribed Meclizine for vertigo. He notes he did not have any Meclizine at home to take this time.     He notes current symptoms have been present for 3 days. He notes room-spinning sensation primarily when he turns his head or changes positions but also notes a low-grade sensation of dizziness even at rest at times. He notes symptoms are improved since coming to the ED. He denies any other associated weakness, numbness, tingling, HA, visual disturbance (is legally blind at baseline), speech difficulty. Patient is unable to have CTA completed due to underlying renal dysfunction.    He notes that he does not smoke, drink ETOH or use drugs. He denies any new medication. Denies fever, chills or recent infection. He notes that he feels his LE are swollen and he was having \"fluttering\" so he wanted this checked in the ED as well. He denies tinnitus, ear fullness, hearing loss. He has not seen ENT or neurology for his symptoms. He notes he is legally blind and does not drive. He notes longstanding history of tremor but has not been evaluated for this.    Review of Systems   Constitutional:  Negative for chills, fatigue and fever.   HENT:  Negative for ear pain, hearing loss, tinnitus and trouble swallowing.    Eyes:  Positive for visual disturbance.        Patient reports he is legally blind. "   Respiratory:  Negative for shortness of breath.    Cardiovascular:  Positive for palpitations. Negative for chest pain.   Gastrointestinal:  Negative for anal bleeding, nausea and vomiting.   Genitourinary:  Negative for difficulty urinating and dysuria.   Musculoskeletal:  Positive for gait problem. Negative for back pain and neck pain.   Skin:  Negative for rash.   Neurological:  Positive for dizziness and tremors. Negative for speech difficulty, weakness, light-headedness, numbness and headaches.   Psychiatric/Behavioral:  Negative for confusion.      Medications  Scheduled Meds:  Continuous Infusions:No current facility-administered medications for this encounter.    PRN Meds:.    Medical History Review: I have reviewed the patient's PMH, PSH, Social History, Family History, Meds, and Allergies     Objective :  Temp:  [98.5 °F (36.9 °C)] 98.5 °F (36.9 °C)  HR:  [90-93] 90  BP: (118-145)/(76-77) 141/77  Resp:  [18-22] 20  SpO2:  [92 %-98 %] 96 %  O2 Device: None (Room air)    Physical Exam  Constitutional:       General: He is not in acute distress.     Appearance: He is obese. He is not ill-appearing, toxic-appearing or diaphoretic.   HENT:      Right Ear: Hearing normal.      Left Ear: Hearing normal.      Mouth/Throat:      Mouth: Mucous membranes are moist.      Pharynx: Oropharynx is clear. No oropharyngeal exudate or posterior oropharyngeal erythema.   Eyes:      General: No scleral icterus.        Right eye: No discharge.         Left eye: No discharge.      Extraocular Movements: EOM normal. No nystagmus.      Conjunctiva/sclera: Conjunctivae normal.      Comments: R pupil round and reactive to light, L pupil irregular and unreactive. Exophthalmos noted.   Cardiovascular:      Rate and Rhythm: Normal rate and regular rhythm.   Pulmonary:      Effort: Pulmonary effort is normal. No respiratory distress.      Breath sounds: Normal breath sounds.   Abdominal:      General: There is no distension.       Palpations: Abdomen is soft.      Tenderness: There is no abdominal tenderness.   Musculoskeletal:         General: Normal range of motion.      Cervical back: Normal range of motion and neck supple.      Right lower leg: No edema.      Left lower leg: No edema.   Skin:     General: Skin is warm and dry.      Findings: No erythema or rash.   Neurological:      Mental Status: He is alert and oriented to person, place, and time.      Deep Tendon Reflexes:      Reflex Scores:       Bicep reflexes are 1+ on the right side and 1+ on the left side.       Brachioradialis reflexes are 1+ on the right side and 1+ on the left side.       Patellar reflexes are 1+ on the right side and 1+ on the left side.       Achilles reflexes are 1+ on the right side and 1+ on the left side.  Psychiatric:         Mood and Affect: Mood normal.         Speech: Speech normal.         Behavior: Behavior normal.       Neurological Exam  Mental Status  Alert. Oriented to person, place, time and situation. Oriented to person, place, and time. Speech is normal. Language is fluent with no aphasia. Attention and concentration are normal.    Cranial Nerves  CN II: Vision test: R pupil round and reactive to light, L pupil irregular and unreactive. Exophthalmos noted..  CN III, IV, VI: Extraocular movements intact bilaterally. No nystagmus. Normal saccades.   Right pupil: 3 mm. Round. Reactive to light.   Left pupil: Irregular. Abnormal reactivity:  CN V:  Right: Facial sensation is normal.  Left: Facial sensation is normal on the left.  CN VII:  Right: There is no facial weakness.  Left: There is no facial weakness.  CN VIII:  Right: Hearing is normal.  Left: Hearing is normal.  CN IX, X: Palate elevates symmetrically  CN XI: Shoulder shrug strength is normal.  CN XII: Tongue midline without atrophy or fasciculations.    Motor  Normal muscle bulk throughout. No fasciculations present. Normal muscle tone. No rigidity noted. The following abnormal  movements were seen: Intention tremor noted B/L..   No pronator drift.  Motor strength 5/5 B/L UE and LE..    Sensory  Light touch is normal in upper and lower extremities. Temperature is normal in upper and lower extremities.     Reflexes                                            Right                      Left  Brachioradialis                    1+                         1+  Biceps                                 1+                         1+  Patellar                                1+                         1+  Achilles                                1+                         1+  Right Plantar: downgoing  Left Plantar: downgoing    Coordination  Right: Finger-to-nose normal. Heel-to-shin normal.Left: Finger-to-nose normal. Heel-to-shin normal.    Gait    Deferred for safety..        Lab Results: I have reviewed the following results:I have personally reviewed pertinent reports.    Recent Results (from the past 24 hours)   Fingerstick Glucose (POCT)    Collection Time: 04/08/25 11:27 AM   Result Value Ref Range    POC Glucose 182 (H) 65 - 140 mg/dl   ECG 12 lead    Collection Time: 04/08/25 11:36 AM   Result Value Ref Range    Ventricular Rate 89 BPM    Atrial Rate 89 BPM    OH Interval 144 ms    QRSD Interval 86 ms    QT Interval 356 ms    QTC Interval 433 ms    P Axis 39 degrees    QRS Axis -27 degrees    T Wave Axis 77 degrees   CBC and differential    Collection Time: 04/08/25 12:02 PM   Result Value Ref Range    WBC 6.21 4.31 - 10.16 Thousand/uL    RBC 4.04 3.88 - 5.62 Million/uL    Hemoglobin 12.0 12.0 - 17.0 g/dL    Hematocrit 37.7 36.5 - 49.3 %    MCV 93 82 - 98 fL    MCH 29.7 26.8 - 34.3 pg    MCHC 31.8 31.4 - 37.4 g/dL    RDW 12.9 11.6 - 15.1 %    MPV 10.6 8.9 - 12.7 fL    Platelets 144 (L) 149 - 390 Thousands/uL    nRBC 0 /100 WBCs    Segmented % 70 43 - 75 %    Immature Grans % 1 0 - 2 %    Lymphocytes % 20 14 - 44 %    Monocytes % 6 4 - 12 %    Eosinophils Relative 2 0 - 6 %    Basophils Relative  "1 0 - 1 %    Absolute Neutrophils 4.36 1.85 - 7.62 Thousands/µL    Absolute Immature Grans 0.03 0.00 - 0.20 Thousand/uL    Absolute Lymphocytes 1.25 0.60 - 4.47 Thousands/µL    Absolute Monocytes 0.40 0.17 - 1.22 Thousand/µL    Eosinophils Absolute 0.13 0.00 - 0.61 Thousand/µL    Basophils Absolute 0.04 0.00 - 0.10 Thousands/µL   Comprehensive metabolic panel    Collection Time: 04/08/25 12:02 PM   Result Value Ref Range    Sodium 137 135 - 147 mmol/L    Potassium 4.4 3.5 - 5.3 mmol/L    Chloride 104 96 - 108 mmol/L    CO2 24 21 - 32 mmol/L    ANION GAP 9 4 - 13 mmol/L    BUN 25 5 - 25 mg/dL    Creatinine 2.35 (H) 0.60 - 1.30 mg/dL    Glucose 177 (H) 65 - 140 mg/dL    Calcium 9.1 8.4 - 10.2 mg/dL    AST 16 13 - 39 U/L    ALT 19 7 - 52 U/L    Alkaline Phosphatase 54 34 - 104 U/L    Total Protein 7.0 6.4 - 8.4 g/dL    Albumin 4.4 3.5 - 5.0 g/dL    Total Bilirubin 0.67 0.20 - 1.00 mg/dL    eGFR 25 ml/min/1.73sq m   HS Troponin 0hr (reflex protocol)    Collection Time: 04/08/25 12:02 PM   Result Value Ref Range    hs TnI 0hr 4 \"Refer to ACS Flowchart\"- see link ng/L   B-Type Natriuretic Peptide(BNP)    Collection Time: 04/08/25 12:02 PM   Result Value Ref Range    BNP 60 0 - 100 pg/mL   Fingerstick Glucose (POCT)    Collection Time: 04/08/25  1:18 PM   Result Value Ref Range    POC Glucose 162 (H) 65 - 140 mg/dl   UA (URINE) with reflex to Scope    Collection Time: 04/08/25  1:20 PM   Result Value Ref Range    Color, UA Light Yellow     Clarity, UA Clear     Specific Gravity, UA 1.014 1.003 - 1.030    pH, UA 5.0 4.5, 5.0, 5.5, 6.0, 6.5, 7.0, 7.5, 8.0    Leukocytes, UA Negative Negative    Nitrite, UA Negative Negative    Protein, UA Negative Negative mg/dl    Glucose, UA Negative Negative mg/dl    Ketones, UA Negative Negative mg/dl    Urobilinogen, UA <2.0 <2.0 mg/dl mg/dl    Bilirubin, UA Negative Negative    Occult Blood, UA Negative Negative   HS Troponin I 2hr    Collection Time: 04/08/25  1:56 PM   Result Value " "Ref Range    hs TnI 2hr 4 \"Refer to ACS Flowchart\"- see link ng/L    Delta 2hr hsTnI 0 <20 ng/L     Imaging  Imaging Results Review: I personally reviewed the following image studies in PACS and associated radiology reports: CTH- No evidence of acute intracranial process. Chronic microangiopathy.    "

## 2025-04-08 NOTE — ASSESSMENT & PLAN NOTE
"78 y/o male with prostate cancer, HLD, DM, who presented to the ED on 4/8 with complaints of vertigo, weight gain, and LE swelling. BP on presentation 145/77. Patient seen and examined and reports longstanding history of vertigo with current episode lasting 3 days.    Neuroimaging  4/8/2025 CTH:   No evidence of acute intracranial process.   Chronic microangiopathy.   4/8/2025 MRI brain without contrast:   Normal MRI of the brain.   No acute intracranial abnormality.     Suspect symptoms secondary to BPPV.     Plan:  - Recommend continue Meclizine 25 mg Q8H PRN.   - Recommend outpatient vestibular therapy.   - Recommend follow-up with general neurology team and if symptoms persist, consider referral to ENT.   - Defer ongoing evaluation and management of patient's underlying medical comorbidities and complaints of leg swelling and \"fluttering\" as per primary team.  - Above case and plan discussed with attending neurologist and she is in agreement with above plan of care.  "

## 2025-04-08 NOTE — DISCHARGE INSTRUCTIONS
You were seen and evaluated today for dizziness.  Your test results demonstrated normal MRI  Please take all medications as instructed. Follow up with your PCP as discussed.   RETURN TO THE EMERGENCY DEPARTMENT if you develop new or worsening symptoms and are unable to see your PCP.

## 2025-04-15 NOTE — TELEPHONE ENCOUNTER
IA- HOME- 4/8/2025    SPOUSE ON CONSENT FORM, HAYES    2nd Attempt,     Called pt , HIS NUMBER IS NOT IN SERVICE, THEN CALLED SPOUSE ON CONSENT FORM NO ANSWER, LMOM     Letter Sent.    Thank you,     Brandy

## 2025-04-15 NOTE — TELEPHONE ENCOUNTER
Pt called to schedule HFU appt. Pt was scheduled to first available appt with  on 08/25/2025 and put on a wait list.

## 2025-04-16 DIAGNOSIS — R42 VERTIGO: ICD-10-CM

## 2025-04-17 RX ORDER — MECLIZINE HYDROCHLORIDE 25 MG/1
25 TABLET ORAL 3 TIMES DAILY PRN
Qty: 30 TABLET | Refills: 0 | Status: SHIPPED | OUTPATIENT
Start: 2025-04-17

## 2025-04-17 NOTE — TELEPHONE ENCOUNTER
Patient calling in to check status of his medication refill below. He took his last dose on 4/17/25    Please advise

## 2025-04-17 NOTE — TELEPHONE ENCOUNTER
Pt called to ask if Dr Grove had sent in his Rx for meclizine.  Pt took his last tablet this morning and is due to take another one today between 3:00 and 4:00.  Please call pt to let him know when the Rx has been sent in.  Thank you!

## 2025-04-21 ENCOUNTER — RA CDI HCC (OUTPATIENT)
Dept: OTHER | Facility: HOSPITAL | Age: 80
End: 2025-04-21

## 2025-04-21 NOTE — PROGRESS NOTES
HCC coding opportunities    E11.40  I12.9 for david  GR     Chart Reviewed number of suggestions sent to Provider: 2     Patients Insurance     Medicare Insurance: [employer]

## 2025-04-23 ENCOUNTER — OFFICE VISIT (OUTPATIENT)
Dept: FAMILY MEDICINE CLINIC | Facility: CLINIC | Age: 80
End: 2025-04-23
Payer: COMMERCIAL

## 2025-04-23 VITALS
OXYGEN SATURATION: 96 % | BODY MASS INDEX: 33.95 KG/M2 | TEMPERATURE: 99.1 F | WEIGHT: 224 LBS | HEART RATE: 96 BPM | HEIGHT: 68 IN | SYSTOLIC BLOOD PRESSURE: 118 MMHG | DIASTOLIC BLOOD PRESSURE: 74 MMHG

## 2025-04-23 DIAGNOSIS — R53.83 OTHER FATIGUE: ICD-10-CM

## 2025-04-23 DIAGNOSIS — E78.2 MIXED HYPERLIPIDEMIA: ICD-10-CM

## 2025-04-23 DIAGNOSIS — Z79.4 TYPE 2 DIABETES MELLITUS WITH STAGE 4 CHRONIC KIDNEY DISEASE, WITH LONG-TERM CURRENT USE OF INSULIN (HCC): Primary | ICD-10-CM

## 2025-04-23 DIAGNOSIS — C34.32 MALIGNANT NEOPLASM OF LOWER LOBE OF LEFT LUNG (HCC): ICD-10-CM

## 2025-04-23 DIAGNOSIS — Z85.46 PERSONAL HISTORY OF PROSTATE CANCER: ICD-10-CM

## 2025-04-23 DIAGNOSIS — E66.01 OBESITY, MORBID (HCC): ICD-10-CM

## 2025-04-23 DIAGNOSIS — F33.40 RECURRENT MAJOR DEPRESSIVE DISORDER, IN REMISSION (HCC): ICD-10-CM

## 2025-04-23 DIAGNOSIS — E11.22 TYPE 2 DIABETES MELLITUS WITH STAGE 4 CHRONIC KIDNEY DISEASE, WITH LONG-TERM CURRENT USE OF INSULIN (HCC): Primary | ICD-10-CM

## 2025-04-23 DIAGNOSIS — J44.9 CHRONIC OBSTRUCTIVE PULMONARY DISEASE, UNSPECIFIED COPD TYPE (HCC): ICD-10-CM

## 2025-04-23 DIAGNOSIS — N18.4 TYPE 2 DIABETES MELLITUS WITH STAGE 4 CHRONIC KIDNEY DISEASE, WITH LONG-TERM CURRENT USE OF INSULIN (HCC): Primary | ICD-10-CM

## 2025-04-23 DIAGNOSIS — I42.9 CARDIOMYOPATHY, UNSPECIFIED TYPE (HCC): ICD-10-CM

## 2025-04-23 LAB — SL AMB POCT HEMOGLOBIN AIC: 5 (ref ?–6.5)

## 2025-04-23 PROCEDURE — G2211 COMPLEX E/M VISIT ADD ON: HCPCS | Performed by: FAMILY MEDICINE

## 2025-04-23 PROCEDURE — 99214 OFFICE O/P EST MOD 30 MIN: CPT | Performed by: FAMILY MEDICINE

## 2025-04-23 PROCEDURE — 83036 HEMOGLOBIN GLYCOSYLATED A1C: CPT | Performed by: FAMILY MEDICINE

## 2025-04-23 NOTE — ASSESSMENT & PLAN NOTE
Patient is stable with current meds and discussed a low carb diet. Pt  recommended to see eye doctor and foot doctor for routine screening as per protocol. Recheck A1C  and Cr in 3 months. Patient questions answered today about this condtion.   Lab Results   Component Value Date    HGBA1C 5.0 04/23/2025     Orders:  •  Albumin / creatinine urine ratio; Future  •  POCT hemoglobin A1c

## 2025-04-28 ENCOUNTER — EVALUATION (OUTPATIENT)
Age: 80
End: 2025-04-28
Payer: COMMERCIAL

## 2025-04-28 DIAGNOSIS — R42 VERTIGO: Primary | ICD-10-CM

## 2025-04-28 PROCEDURE — 97161 PT EVAL LOW COMPLEX 20 MIN: CPT

## 2025-04-28 PROCEDURE — 97112 NEUROMUSCULAR REEDUCATION: CPT

## 2025-04-28 NOTE — PROGRESS NOTES
PT Evaluation          POC expires Unit limit Auth Expiration date PT/OT + Visit Limit? Co-Insurance   25 N/a pend BOMN $20 Co-pay                               Visit/Unit Tracking  AUTH Status:  Date               pend Used 1               Remaining                             Today's date: 2025  Patient name: Adam Stone  : 1945  MRN: [id number]  Referring provider: Nancie Martinez MD  Dx:   Encounter Diagnosis     ICD-10-CM    1. Vertigo  R42 Ambulatory Referral to Physical Therapy            Assessment  Assessment details: Patient is a 79 y.o. Male who presents to skilled outpatient PT with complaints of room spinning dizziness. Cervical spine integrity intact per normal and negative results of mVBI, Sharp Anna, and Alar Stability Tests respectively. Did report intake of Meclizine prior to evaluation; provided patient/wife education on med impact on assessment. Patient displayed minimal L upward torisonal nystagmus with positional assessment, but greatest sensitivity with L Chaz-Hallpike indicating likely L Posterior Canalithiasis. Trialed L Epley Maneuver today with Good results and reduction in symptoms by final repetition. Educated the patient on the anatomy of the inner ear, potential for residual dizziness for up to 48 hours following session, and to seek higher level of care if symptoms worsen or change. Advised patient to not take Meclizine prior to next session. He was in good verbal understanding. He will benefit from skilled outpatient PT in order to reduce dizziness symptoms and return to PLOF.    Patient verbalized understanding of POC.    Please contact me if you have any questions or recommendations. Thank you for the referral and the opportunity to share in Adam Stone's care.      Cut off score   All date taken from [facility] Neuro Section or Rehab Measures    DGI:  MDC for Vestibular Disorders: 4  "points  MDC for Geriatrics/Community Dwelling Older Adults: 3 Points  Falls risk cut off: <19/24    FGA:  MCID: 4 points  Geriatrics/Community Dwelling Older Adults: </= 22/30 fall risk  Geriatrics/Community Dwelling Older Adults: </= 20/30 unexplained falls in the next 6 months  Parkinsons: </= 18/30 fall risk    mCTSIB (normed on ages 20-60, lower number is less sway or better static balance)  Eyes open firm surface (norm 0.21-0.48)  Eyes closed firm surface (norm 0.48-0.99)  Eyes open foam surface (norm 0.38-0.71)  Eyes closed foam surface (norm 0.70-2.22)    DHI:  0-39: low perception of handicap  40-69: moderate perception of handicap  : severe perception of handicap  > 60: increased risk for falls        Impairments: activity intolerance, impaired balance, lacks appropriate, HEP, safety issue  Understanding of Dx/Px/POC: Good  Prognosis: Good      Goals    BPPV Goals (4 weeks):  - Patient will report complete resolution of symptoms in order to promote return to PLOF  - Patient will complete FGA in order to promote return to safe performance of ADLs  - Patient will demonstrate (-) [place]-Hallpike test on L side        Plan  Plan details:   Patient would benefit from: PT Eval  Planned therapy interventions: balance, HEP, manual therapy, neuromuscular re-education, patient education  Frequency: 1-3x per week  Duration in weeks: 8 weeks  Plan of Care beginning date: 4/28/2025  Plan of Care expiration date: 2 months - 6/28/2025  Treatment plan discussed with: Patient and Family        Subjective Evaluation    History of Present Illness  - Mechanism of injury: Has been experienced dizziness for the past two weeks. Had previous hx of vertigo (approx 10 years ago). Reports fall Sat 4/26. Does have PMHx of ocular deficits (including peripheral damage).     Patient goal:   Get rid of dizziness    Dizziness Subjective  - How long does dizziness last: 2 minutes  - How would you describe the dizziness: \"room-spinning\" "   - Rolling in bed: No  - Supine to/from sit: Yes  - Recent hearing loss: No  - Tinnitus: No  - Aural fullness/ear pain: No  - Vision changes: No  - History of recent viral infections: No  - History of migraines: No    Red Flag Screen  - Numbness: No  - Tingling: No  - Weakness: No  - Unilateral hearing loss: No  - Slurred speech: No  - Progressive hearing loss: No  - Tremors: No  - Poor coordination: No  - UMN signs: No  - LoC: No  - Rigidity: No  - Visual field loss: No  - Memory loss: No  - CN dysfunction: No  - Vertical nystagmus: No    Pain  Patient denied pain    Social Support  Steps to enter house: no, ramp  Stairs in house: FF with L HR (first floor set up)  Lives in: [facility]  Lives with: wife ([place])    Employment status: retired      Treatments  Previous treatment: ortho PT  Current treatment: PCP, ED  Diagnostic Testing: MRI indicates no acute abnormalities      Objective     BPPV Objective  Integrity Testing  - mVBI: asymptomatic  - Sharp Anna: intact  - Alar Ligament Stability Test: intact    Positional Testing  - R Chaz-Hallpike: + torsional nystagmus  - L Chaz-Hallpike: + torsional nystagmus, inc severity  - R Roll Test: -  - L Roll Test: -    L Epley maneuver x 3       Outcome Measures Initial Eval  4/28        mCTSIB  - FTEO (firm)  - FTEC (firm)  - FTEO (foam)  - FTEC (foam)   Defer        DGI Defer        FGA Defer        10 meter Defer        DHI 60/100, moderate perception                                                          Precautions: hx BPPV, hx CA, PTSD, T2DM, standard fall precautions  Past Medical History:   Diagnosis Date    Cancer (HCC)     PROSTATE CANCER    Colon polyp     Hyperlipidemia     Prostate cancer (HCC) 05/29/2020    PTSD (post-traumatic stress disorder)     Type 2 diabetes mellitus without complication, with long-term current use of insulin ([facility]) 05/29/2020    Type 2 diabetes mellitus without complication, with long-term current use of insulin ([facility]) 05/29/2020

## 2025-04-29 ENCOUNTER — OFFICE VISIT (OUTPATIENT)
Age: 80
End: 2025-04-29
Attending: EMERGENCY MEDICINE
Payer: COMMERCIAL

## 2025-04-29 DIAGNOSIS — R42 VERTIGO: Primary | ICD-10-CM

## 2025-04-29 PROCEDURE — 97112 NEUROMUSCULAR REEDUCATION: CPT

## 2025-04-29 NOTE — PROGRESS NOTES
"Daily Note     Today's date: 2025  Patient name: Adam Stone  : 1945  MRN: [id number]  Referring provider: Nancie Martinez MD  Dx:   Encounter Diagnosis     ICD-10-CM    1. Vertigo  R42                      Subjective: \"I feel like I'm walking drunk... I almost stumbled this morning.\" Reports + dizziness and off-balance.      Objective: See treatment diary below    NMR  Epley maneuver x4    Positional testing  Loaded [place]-Hallpike (-) x 2     Amb from clinic <> waiting room / car 120-150' x 2, req CG-Shanae   2 overt LOB following session requiring Shanae    Assessment: Tolerated treatment well. Performed L Epley for suspected L P-BPPV with eventual resolution of s/s. Will re-assess positionals NV and treat accordingly. Patient exhibited good technique with therapeutic exercises and would benefit from continued PT      Plan: Continue per plan of care.        POC expires Unit limit Auth Expiration date PT/OT + Visit Limit? Co-Insurance   25 N/a pend BOMN $20 Co-pay                               Visit/Unit Tracking  AUTH Status:  Date              pend Used 1 2              Remaining                           "

## 2025-05-01 ENCOUNTER — OFFICE VISIT (OUTPATIENT)
Age: 80
End: 2025-05-01
Attending: EMERGENCY MEDICINE
Payer: COMMERCIAL

## 2025-05-01 DIAGNOSIS — R42 VERTIGO: Primary | ICD-10-CM

## 2025-05-01 PROCEDURE — 97112 NEUROMUSCULAR REEDUCATION: CPT

## 2025-05-01 NOTE — PROGRESS NOTES
"Daily Note     Today's date: 2025  Patient name: Adam Stone  : 1945  MRN: [id number]  Referring provider: Nancie Martinez MD  Dx:   Encounter Diagnosis     ICD-10-CM    1. Vertigo  R42                      Subjective: \"I feel like I'm walking drunk... I almost stumbled this morning.\" Reports + dizziness and off-balance.      Objective: See treatment diary below    NMR  Positional Testing   L Chaz-Hallpike (+)    R [place]-Hallpike (+)   Roll Test (-) BL    Semont maneuver x 5      L Chaz-Hallpike (-) x 2    Amb from clinic <> waiting room / car 120-150' x 2, req CG-Shanae   2 overt LOB following session requiring Shanae    Assessment: Tolerated treatment well. Performed L Semont for L P-BPPV with eventual resolution of s/s. Will re-assess positionals NV and treat accordingly. Patient exhibited good technique with therapeutic exercises and would benefit from continued PT      Plan: Continue per plan of care.        POC expires Unit limit Auth Expiration date PT/OT + Visit Limit? Co-Insurance   25 N/a pend BOMN $20 Co-pay                               Visit/Unit Tracking  AUTH Status:  Date             pend Used 1 2 3             Remaining                           "

## 2025-05-05 ENCOUNTER — OFFICE VISIT (OUTPATIENT)
Age: 80
End: 2025-05-05
Attending: EMERGENCY MEDICINE
Payer: COMMERCIAL

## 2025-05-05 DIAGNOSIS — R42 VERTIGO: Primary | ICD-10-CM

## 2025-05-05 PROCEDURE — 97112 NEUROMUSCULAR REEDUCATION: CPT

## 2025-05-05 NOTE — PROGRESS NOTES
Daily Note     Today's date: 2025  Patient name: Adam Stone  : 1945  MRN: [id number]  Referring provider: Nancie Martinez MD  Dx:   Encounter Diagnosis     ICD-10-CM    1. Vertigo  R42                      Subjective: Patient reports slight dizziness persists, but intensity and duration are less.      Objective: See treatment diary below    NMR  Positional Testing x 2    L [place]pike (-) x 2     Loaded L Chaz-Hallpike (+), torsional nystagmus,    R [place]pike (+) then (-) with minimal nystagmus, subjective dizziness lasting ~ 5s   L Roll Test + horizontal nystagmus, subjective dizziness ~ 5s  R Roll Test (-)    Oculomotor Screen  - Baseline Symptoms: 0/10  - Gaze Holding Nystagmus: H: Normal  - Spontaneous Nystagmus Room Light: H: Normal  - Smooth Pursuits (central): H: Normal   - Saccades (central): H: Normal   - VORx1: H: Normal Dizziness: 0/10  - VOR Cancel (central): H: Normal Dizziness: 0/10  - Head Thrust (moderate to severe hypofunction): H: Normal    MSQ asymptomatic minus:  Sit <> supine: horizontal nystagmus, 4/5 intensity, lasting < 5-10s  Supine to L side: horizontal nystagmus, 4/5, lasting < 5s  R Hallpike: slight torsional 1/5 lasting < 5s    Loaded [place]-Hallpike:   R (-)   L (+) torsional nystagmus    Assessment: Tolerated treatment well. Inconsistencies noted with positional testing. Plausible BPPV within L PSCC within short arc. Recommend continued trial of L Semont NV due to ongoing + subjective intake and reduction in intensity/duration. However, hypothesize may be more complex than L P-SCC. If remains abnormal pend follow up visits, will refer out for VNG/ENT. Edu patient on PT POC and findings, with patient verbalizing understanding and in agreement with POC. Therapist has req script for VNG from PCP. Patient exhibited good technique with therapeutic exercises and would benefit from continued PT      Plan: Continue per plan of care.        POC expires Unit limit Auth  Expiration date PT/OT + Visit Limit? Co-Insurance   6/28/25 N/a pend BOMN $20 Co-pay                               Visit/Unit Tracking  AUTH Status:  Date 4/28 4/29 5/1 5/5           pend Used 1 2 3 4            Remaining

## 2025-05-07 ENCOUNTER — OFFICE VISIT (OUTPATIENT)
Age: 80
End: 2025-05-07
Attending: EMERGENCY MEDICINE
Payer: COMMERCIAL

## 2025-05-07 DIAGNOSIS — R42 VERTIGO: Primary | ICD-10-CM

## 2025-05-07 PROCEDURE — 97112 NEUROMUSCULAR REEDUCATION: CPT

## 2025-05-07 PROCEDURE — 97530 THERAPEUTIC ACTIVITIES: CPT

## 2025-05-07 NOTE — PROGRESS NOTES
Daily Note     Today's date: 2025  Patient name: Adam Stone  : 1945  MRN: [id number]  Referring provider: Nancie Martinez MD  Dx:   Encounter Diagnosis     ICD-10-CM    1. Vertigo  R42                      Subjective: Patient reports slight dizziness persists, but intensity and duration are less.      Objective: See treatment diary below    NMR    L [place] x 5      Assessment: Tolerated treatment well. Inconsistencies noted with positional testing. Plausible BPPV within L PSCC within short arc. Continued trial of Semont due to positive results from previous sessions. Will reassess positionals NV. If remains abnormal pend follow up visits, will refer out for VNG/ENT. Edu patient on PT POC and findings, with patient verbalizing understanding and in agreement with POC. Patient exhibited good technique with therapeutic exercises and would benefit from continued PT      Plan: Continue per plan of care.        POC expires Unit limit Auth Expiration date PT/OT + Visit Limit? Co-Insurance   25 N/a pend BOMN $20 Co-pay                               Visit/Unit Tracking  AUTH Status:  Date           pend Used 1 2 3 4 5           Remaining

## 2025-05-09 ENCOUNTER — OFFICE VISIT (OUTPATIENT)
Age: 80
End: 2025-05-09
Attending: EMERGENCY MEDICINE
Payer: COMMERCIAL

## 2025-05-09 DIAGNOSIS — R42 VERTIGO: Primary | ICD-10-CM

## 2025-05-09 PROCEDURE — 97112 NEUROMUSCULAR REEDUCATION: CPT | Performed by: PHYSICAL THERAPIST

## 2025-05-09 PROCEDURE — 97530 THERAPEUTIC ACTIVITIES: CPT | Performed by: PHYSICAL THERAPIST

## 2025-05-09 NOTE — PROGRESS NOTES
Daily Note     Today's date: 2025  Patient name: Adam Stone  : 1945  MRN: [id number]  Referring provider: Nancie Martinez MD  Dx:   Encounter Diagnosis     ICD-10-CM    1. Vertigo  R42             Start Time: 809  Stop Time: 836  Total time in clinic (min): 27 minutes    Subjective: Patient reports slight dizziness persists, but intensity and duration are less.      Objective: See treatment diary below    Positional testing:  Chaz-hallpike  L: negative   R: negative   Loaded R: negative x 1 rep and 2nd rep horizontal beating only   Loaded L: negative x 2    Roll test:  R: negative x 2   L: negative x 2     Assessment: Tolerated treatment well. Inconsistencies noted with positional testing.  Referred out for VNG/ENT.   Pt to be put on hold, resume tx after VNG results.  Edu patient on PT POC and findings, with patient verbalizing understanding and in agreement with POC.       Plan: On hold until VNG results are processed.       POC expires Unit limit Auth Expiration date PT/OT + Visit Limit? Co-Insurance   25 N/a pend BOMN $20 Co-pay                               Visit/Unit Tracking  AUTH Status:  Date          pend Used 1 2 3 4 5 6          Remaining

## 2025-05-12 DIAGNOSIS — R42 VERTIGO: Primary | ICD-10-CM

## 2025-06-24 ENCOUNTER — NURSE TRIAGE (OUTPATIENT)
Age: 80
End: 2025-06-24

## 2025-06-24 NOTE — TELEPHONE ENCOUNTER
"REASON FOR CONVERSATION: Leg Swelling    SYMPTOMS: Severe edema in both legs that started 2 weeks ago and is causing him to have shortness of breath     OTHER HEALTH INFORMATION: is not currently on a diuretic    PROTOCOL DISPOSITION: Go to ED Now    CARE ADVICE PROVIDED: Advised EMERGENCY DEPARTMENT     PRACTICE FOLLOW-UP: Unsure if he will go and is recommending either bumex or another diuretic to be ordered              Reason for Disposition   Difficulty breathing at rest    Answer Assessment - Initial Assessment Questions  1. ONSET: \"When did the swelling start?\" (e.g., minutes, hours, days)      For 2 weeks  2. LOCATION: \"What part of the leg is swollen?\"  \"Are both legs swollen or just one leg?\"      Both legs  3. SEVERITY: \"How bad is the swelling?\" (e.g., localized; mild, moderate, severe)      severe  4. REDNESS: \"Does the swelling look red or infected?\"      no  5. PAIN: \"Is the swelling painful to touch?\" If Yes, ask: \"How painful is it?\"   (Scale 1-10; mild, moderate or severe)     mild  6. FEVER: \"Do you have a fever?\" If Yes, ask: \"What is it, how was it measured, and when did it start?\"       no  7. CAUSE: \"What do you think is causing the leg swelling?\"      edema  8. MEDICAL HISTORY: \"Do you have a history of blood clots (e.g., DVT), cancer, heart failure, kidney disease, or liver failure?\"      Heart failure  9. RECURRENT SYMPTOM: \"Have you had leg swelling before?\" If Yes, ask: \"When was the last time?\" \"What happened that time?\"      Yes,   10. OTHER SYMPTOMS: \"Do you have any other symptoms?\" (e.g., chest pain, difficulty breathing)        Difficulty breathing    Protocols used: Leg Swelling and Edema-Adult-[place]    "

## 2025-07-02 ENCOUNTER — OFFICE VISIT (OUTPATIENT)
Dept: AUDIOLOGY | Age: 80
End: 2025-07-02
Attending: FAMILY MEDICINE
Payer: COMMERCIAL

## 2025-07-02 DIAGNOSIS — R42 VERTIGO: ICD-10-CM

## 2025-07-02 PROCEDURE — 92567 TYMPANOMETRY: CPT | Performed by: AUDIOLOGIST

## 2025-07-02 PROCEDURE — 92540 BASIC VESTIBULAR EVALUATION: CPT | Performed by: AUDIOLOGIST

## 2025-07-02 PROCEDURE — 92537 CALORIC VSTBLR TEST W/REC: CPT | Performed by: AUDIOLOGIST

## 2025-07-02 NOTE — PROGRESS NOTES
Videonystagmography (VNG) Evaluation    Name:  Adam Stone  :  1945  Age:  79 y.o.  MRN:  [id number]  Date of Evaluation: 25     HISTORY:     Reason for visit: Dizziness    Adam Stone is seen today at the request of Dr. Grove for VNG testing. Adam was accompanied by his wife  to today's visit.. Today, Adam reported that onset of symptoms began in April when he was demonstrating an exercise and suddenly experience severe vertigo. The current symptoms are described as intermittent in frequency, but have decreased significantly in frequency. Dizziness perception is described as a(n) true vertigo  sensation.The patient was seen by PT who treated for BPPV of the left posterior canal.  Treatment reportedly improved symptoms but there was some residual nystagmus so patient was referred for VNG testing.      NOTE:  Patient is legally blind.  Default calibration was used today.  Abnormal oculomotors must be interpreted with caution as patient's abnormal results are likely at least partially due to poor visual acuity and cannot definitively be labelled as a central finding.     Otoscopic Evaluation:   Right Ear: Unremarkable, canal clear   Left Ear: Unremarkable, canal clear    Tympanometry:   Right: Type A; normal middle ear pressure and static compliance    Left: Type A; normal middle ear pressure and static compliance     Oculomotor battery:   Gaze:   Right: Within normal limits  Left: Within normal limits  Up: Within normal limits  Down: Within normal limits      Tracking: Abnormal: Gain bilaterally . See note above.    Saccades: Abnormal: Velocity   , Latency   , and Accuracy . See note above     Optokinetic: Borderline abnormal: Gain bilaterally. See note above    Positioning/Positionals:     [place]:    Right: Negative.    Left: Brief, immediate slight leftbeating/clockwise and upbeating nystagmus. Very mild concurrent dizziness. No reversal of nystagmus with return to sitting.         Positionals:   Sitting: Within normal limits   Supine:  Within normal limis  Head Right: Brief burst of 2 degrees rightbeating nystagmus, not clinically significant  Head Left: Within normal limits   Body Right: Within normal limits   Body Left: Within normal limits      30 degrees Supine:  5 degrees upbeating nystagmus, not clinically significant    Calorics: (Normal response <25% difference)    [place] Caloric Irrigation: Borderline abnormal. Borderline weakness 24% left     Caloric irrigations  completed without incident with good parting otoscopy noted.       IMPRESSIONS:     Borderline abnormal left peripheral weakness    Possible mild BPPV of left posterior canal    Abnormal OPKs must be interpreted as a central finding with extreme caution, as patient is legally blind and poor performance on these tasks may be due to low vision.    RECOMMENDATIONS:     1) Follow-up with referring provider to review today's results.  2) Continue to monitor dizziness symptoms. If symptoms worsen or fail to improve prior to follow-up with their referring provider, contact your primary care/or referring provider and/or urgent medical attention should be considered.  3) Fall precautions were discussed at length with the patient. Most test effects are expected to subside shortly after testing is completed, it was recommended that they use caution moving around for the remainder of the day.   4) Continue vestibular therapy with [facility].      Reed Sabillon, CCC-A  Clinical Audiologist  [facility]  [address] [id number]

## 2025-07-17 ENCOUNTER — RA CDI HCC (OUTPATIENT)
Dept: RADIOLOGY | Facility: HOSPITAL | Age: 80
End: 2025-07-17

## 2025-07-21 ENCOUNTER — RA CDI HCC (OUTPATIENT)
Dept: OTHER | Facility: HOSPITAL | Age: 80
End: 2025-07-21

## 2025-07-21 NOTE — PROGRESS NOTES
E11.40 GR  HCC coding opportunities          Chart Reviewed number of suggestions sent to Provider: 1     Patients Insurance     Medicare Insurance: [employer] Advantage

## 2025-07-27 PROBLEM — F33.40 RECURRENT MAJOR DEPRESSIVE DISORDER, IN REMISSION (HCC): Status: RESOLVED | Noted: 2023-03-15 | Resolved: 2025-07-27

## 2025-07-28 ENCOUNTER — OFFICE VISIT (OUTPATIENT)
Dept: FAMILY MEDICINE CLINIC | Facility: CLINIC | Age: 80
End: 2025-07-28
Payer: COMMERCIAL

## 2025-07-28 VITALS
DIASTOLIC BLOOD PRESSURE: 62 MMHG | HEART RATE: 91 BPM | OXYGEN SATURATION: 95 % | TEMPERATURE: 97.6 F | HEIGHT: 68 IN | BODY MASS INDEX: 34.71 KG/M2 | SYSTOLIC BLOOD PRESSURE: 116 MMHG | WEIGHT: 229 LBS

## 2025-07-28 DIAGNOSIS — J44.9 CHRONIC OBSTRUCTIVE PULMONARY DISEASE, UNSPECIFIED COPD TYPE (HCC): ICD-10-CM

## 2025-07-28 DIAGNOSIS — R42 VERTIGO: ICD-10-CM

## 2025-07-28 DIAGNOSIS — N18.4 STAGE 4 CHRONIC KIDNEY DISEASE (HCC): ICD-10-CM

## 2025-07-28 DIAGNOSIS — N18.4 TYPE 2 DIABETES MELLITUS WITH STAGE 4 CHRONIC KIDNEY DISEASE, WITH LONG-TERM CURRENT USE OF INSULIN (HCC): ICD-10-CM

## 2025-07-28 DIAGNOSIS — Z79.899 OTHER LONG TERM (CURRENT) DRUG THERAPY: ICD-10-CM

## 2025-07-28 DIAGNOSIS — Z85.46 PERSONAL HISTORY OF PROSTATE CANCER: ICD-10-CM

## 2025-07-28 DIAGNOSIS — E11.40 TYPE 2 DIABETES MELLITUS WITH DIABETIC NEUROPATHY, WITHOUT LONG-TERM CURRENT USE OF INSULIN (HCC): ICD-10-CM

## 2025-07-28 DIAGNOSIS — G56.03 BILATERAL CARPAL TUNNEL SYNDROME: ICD-10-CM

## 2025-07-28 DIAGNOSIS — E11.22 TYPE 2 DIABETES MELLITUS WITH STAGE 4 CHRONIC KIDNEY DISEASE, WITH LONG-TERM CURRENT USE OF INSULIN (HCC): ICD-10-CM

## 2025-07-28 DIAGNOSIS — I42.9 CARDIOMYOPATHY, UNSPECIFIED TYPE (HCC): Primary | ICD-10-CM

## 2025-07-28 DIAGNOSIS — F41.9 ANXIETY: ICD-10-CM

## 2025-07-28 DIAGNOSIS — I10 ESSENTIAL HYPERTENSION: ICD-10-CM

## 2025-07-28 DIAGNOSIS — F32.A DEPRESSION, UNSPECIFIED DEPRESSION TYPE: ICD-10-CM

## 2025-07-28 DIAGNOSIS — E78.2 MIXED HYPERLIPIDEMIA: ICD-10-CM

## 2025-07-28 DIAGNOSIS — R73.9 HYPERGLYCEMIA: ICD-10-CM

## 2025-07-28 DIAGNOSIS — Z79.4 TYPE 2 DIABETES MELLITUS WITH STAGE 4 CHRONIC KIDNEY DISEASE, WITH LONG-TERM CURRENT USE OF INSULIN (HCC): ICD-10-CM

## 2025-07-28 DIAGNOSIS — E66.01 OBESITY, MORBID (HCC): ICD-10-CM

## 2025-07-28 DIAGNOSIS — M54.40 ACUTE RIGHT-SIDED LOW BACK PAIN WITH SCIATICA, SCIATICA LATERALITY UNSPECIFIED: ICD-10-CM

## 2025-07-28 DIAGNOSIS — F32.0 CURRENT MILD EPISODE OF MAJOR DEPRESSIVE DISORDER, UNSPECIFIED WHETHER RECURRENT (HCC): ICD-10-CM

## 2025-07-28 DIAGNOSIS — E79.0 HYPERURICEMIA: ICD-10-CM

## 2025-07-28 DIAGNOSIS — C34.32 MALIGNANT NEOPLASM OF LOWER LOBE OF LEFT LUNG (HCC): ICD-10-CM

## 2025-07-28 DIAGNOSIS — E83.42 HYPOMAGNESEMIA: ICD-10-CM

## 2025-07-28 DIAGNOSIS — J30.1 ALLERGIC RHINITIS DUE TO POLLEN, UNSPECIFIED SEASONALITY: ICD-10-CM

## 2025-07-28 PROCEDURE — G2211 COMPLEX E/M VISIT ADD ON: HCPCS | Performed by: FAMILY MEDICINE

## 2025-07-28 PROCEDURE — 99214 OFFICE O/P EST MOD 30 MIN: CPT | Performed by: FAMILY MEDICINE

## 2025-07-28 RX ORDER — CLONIDINE HYDROCHLORIDE 0.1 MG/1
0.1 TABLET ORAL
Qty: 90 TABLET | Refills: 1 | Status: SHIPPED | OUTPATIENT
Start: 2025-07-28

## 2025-07-28 RX ORDER — MECLIZINE HYDROCHLORIDE 25 MG/1
25 TABLET ORAL 3 TIMES DAILY PRN
Qty: 90 TABLET | Refills: 1 | Status: SHIPPED | OUTPATIENT
Start: 2025-07-28

## 2025-07-28 RX ORDER — BUMETANIDE 2 MG/1
2 TABLET ORAL DAILY
COMMUNITY
Start: 2025-06-24 | End: 2025-07-28 | Stop reason: SDUPTHER

## 2025-07-28 RX ORDER — GABAPENTIN 300 MG/1
300 CAPSULE ORAL 2 TIMES DAILY
Qty: 60 CAPSULE | Refills: 5 | Status: SHIPPED | OUTPATIENT
Start: 2025-07-28 | End: 2025-08-27

## 2025-07-28 RX ORDER — TRAZODONE HYDROCHLORIDE 50 MG/1
50 TABLET ORAL
Qty: 90 TABLET | Refills: 1 | Status: SHIPPED | OUTPATIENT
Start: 2025-07-28

## 2025-07-28 RX ORDER — CETIRIZINE HYDROCHLORIDE 5 MG/1
5 TABLET ORAL DAILY
Qty: 90 TABLET | Refills: 1 | Status: SHIPPED | OUTPATIENT
Start: 2025-07-28

## 2025-07-28 RX ORDER — BUMETANIDE 2 MG/1
2 TABLET ORAL DAILY
Qty: 90 TABLET | Refills: 1 | Status: SHIPPED | OUTPATIENT
Start: 2025-07-28

## 2025-07-28 RX ORDER — CLONAZEPAM 0.5 MG/1
0.5 TABLET, ORALLY DISINTEGRATING ORAL 2 TIMES DAILY
Qty: 24 TABLET | Refills: 5 | Status: SHIPPED | OUTPATIENT
Start: 2025-07-28

## 2025-07-28 RX ORDER — AMLODIPINE BESYLATE 10 MG/1
10 TABLET ORAL DAILY
Qty: 90 TABLET | Refills: 1 | Status: SHIPPED | OUTPATIENT
Start: 2025-07-28

## 2025-07-28 RX ORDER — BUDESONIDE AND FORMOTEROL FUMARATE DIHYDRATE 160; 4.5 UG/1; UG/1
2 AEROSOL RESPIRATORY (INHALATION) 2 TIMES DAILY
Qty: 10.2 G | Refills: 5 | Status: SHIPPED | OUTPATIENT
Start: 2025-07-28

## 2025-07-28 RX ORDER — PAROXETINE 40 MG/1
40 TABLET, FILM COATED ORAL EVERY MORNING
Qty: 30 TABLET | Refills: 1 | Status: SHIPPED | OUTPATIENT
Start: 2025-07-28

## 2025-07-29 ENCOUNTER — TELEPHONE (OUTPATIENT)
Dept: ADMINISTRATIVE | Facility: OTHER | Age: 80
End: 2025-07-29

## 2025-08-12 ENCOUNTER — APPOINTMENT (OUTPATIENT)
Dept: RADIOLOGY | Facility: AMBULARY SURGERY CENTER | Age: 80
End: 2025-08-12
Attending: STUDENT IN AN ORGANIZED HEALTH CARE EDUCATION/TRAINING PROGRAM
Payer: COMMERCIAL

## 2025-08-12 PROBLEM — M18.0 ARTHRITIS OF CARPOMETACARPAL (CMC) JOINT OF BOTH THUMBS: Status: ACTIVE | Noted: 2025-08-12

## 2025-08-12 PROBLEM — G56.23 CUBITAL TUNNEL SYNDROME OF BOTH UPPER EXTREMITIES: Status: ACTIVE | Noted: 2025-08-12
